# Patient Record
Sex: MALE | URBAN - METROPOLITAN AREA
[De-identification: names, ages, dates, MRNs, and addresses within clinical notes are randomized per-mention and may not be internally consistent; named-entity substitution may affect disease eponyms.]

---

## 2022-09-13 ENCOUNTER — APPOINTMENT (OUTPATIENT)
Dept: URBAN - METROPOLITAN AREA CLINIC 245 | Age: 57
Setting detail: DERMATOLOGY
End: 2022-09-13

## 2022-09-13 DIAGNOSIS — D18.0 HEMANGIOMA: ICD-10-CM

## 2022-09-13 DIAGNOSIS — L82.0 INFLAMED SEBORRHEIC KERATOSIS: ICD-10-CM

## 2022-09-13 DIAGNOSIS — D22 MELANOCYTIC NEVI: ICD-10-CM

## 2022-09-13 DIAGNOSIS — L91.8 OTHER HYPERTROPHIC DISORDERS OF THE SKIN: ICD-10-CM

## 2022-09-13 DIAGNOSIS — L57.8 OTHER SKIN CHANGES DUE TO CHRONIC EXPOSURE TO NONIONIZING RADIATION: ICD-10-CM

## 2022-09-13 DIAGNOSIS — L82.1 OTHER SEBORRHEIC KERATOSIS: ICD-10-CM

## 2022-09-13 PROBLEM — D23.71 OTHER BENIGN NEOPLASM OF SKIN OF RIGHT LOWER LIMB, INCLUDING HIP: Status: ACTIVE | Noted: 2022-09-13

## 2022-09-13 PROBLEM — D22.5 MELANOCYTIC NEVI OF TRUNK: Status: ACTIVE | Noted: 2022-09-13

## 2022-09-13 PROBLEM — D18.01 HEMANGIOMA OF SKIN AND SUBCUTANEOUS TISSUE: Status: ACTIVE | Noted: 2022-09-13

## 2022-09-13 PROCEDURE — OTHER COUNSELING: OTHER

## 2022-09-13 PROCEDURE — 99213 OFFICE O/P EST LOW 20 MIN: CPT | Mod: 25

## 2022-09-13 PROCEDURE — 17110 DESTRUCT B9 LESION 1-14: CPT

## 2022-09-13 PROCEDURE — OTHER LIQUID NITROGEN: OTHER

## 2022-09-13 PROCEDURE — OTHER MIPS QUALITY: OTHER

## 2022-09-13 ASSESSMENT — LOCATION SIMPLE DESCRIPTION DERM
LOCATION SIMPLE: RIGHT UPPER BACK
LOCATION SIMPLE: LEFT FOREHEAD
LOCATION SIMPLE: LEFT CHEEK
LOCATION SIMPLE: LEFT ANTERIOR NECK
LOCATION SIMPLE: CHEST
LOCATION SIMPLE: LEFT UPPER BACK

## 2022-09-13 ASSESSMENT — LOCATION DETAILED DESCRIPTION DERM
LOCATION DETAILED: UPPER STERNUM
LOCATION DETAILED: LEFT INFERIOR MEDIAL MALAR CHEEK
LOCATION DETAILED: LEFT SUPERIOR MEDIAL UPPER BACK
LOCATION DETAILED: RIGHT MEDIAL SUPERIOR CHEST
LOCATION DETAILED: LEFT MEDIAL UPPER BACK
LOCATION DETAILED: LEFT INFERIOR ANTERIOR NECK
LOCATION DETAILED: RIGHT SUPERIOR UPPER BACK
LOCATION DETAILED: LEFT FOREHEAD

## 2022-09-13 ASSESSMENT — LOCATION ZONE DERM
LOCATION ZONE: FACE
LOCATION ZONE: NECK
LOCATION ZONE: TRUNK

## 2022-09-13 NOTE — PROCEDURE: LIQUID NITROGEN
Duration Of Freeze Thaw-Cycle (Seconds): 5-10
Medical Necessity Clause: This procedure was medically necessary because the lesions that were treated were:
Include Z78.9 (Other Specified Conditions Influencing Health Status) As An Associated Diagnosis?: No
Show Applicator Variable?: Yes
Application Tool (Optional): Cry-AC
Medical Necessity Information: It is in your best interest to select a reason for this procedure from the list below. All of these items fulfill various CMS LCD requirements except the new and changing color options.
Post-Care Instructions: I reviewed with the patient in detail post-care instructions. Patient is to wear sunprotection, and avoid picking at any of the treated lesions. Pt may apply Vaseline to crusted or scabbing areas.
Spray Paint Text: The liquid nitrogen was applied to the skin utilizing a spray paint frosting technique.
Detail Level: Detailed
Consent: The patient's consent was obtained including but not limited to risks of crusting, scabbing, blistering, scarring, darker or lighter pigmentary change, recurrence, incomplete removal and infection.
Number Of Freeze-Thaw Cycles: 2 freeze-thaw cycles

## 2022-09-13 NOTE — PROCEDURE: COUNSELING
Sunscreen Recommendations: SPF 50 or higher, applied daily or hourly when heavy sun exposure is anticipated
Nicotinamide Supplementation Recommendations: Nicotinamide is purchased over-the-counter in 500 mg capsules.  Nicotinamide should be taken as one 500 mg capsule twice a day. Supplementation with Nicotinamide does not replace sunscreen application.
Detail Level: Zone
Detail Level: Detailed
Detail Level: Simple

## 2023-09-13 ENCOUNTER — APPOINTMENT (OUTPATIENT)
Dept: URBAN - METROPOLITAN AREA CLINIC 245 | Age: 58
Setting detail: DERMATOLOGY
End: 2023-09-14

## 2023-09-13 DIAGNOSIS — L91.8 OTHER HYPERTROPHIC DISORDERS OF THE SKIN: ICD-10-CM

## 2023-09-13 DIAGNOSIS — L57.8 OTHER SKIN CHANGES DUE TO CHRONIC EXPOSURE TO NONIONIZING RADIATION: ICD-10-CM

## 2023-09-13 DIAGNOSIS — D18.0 HEMANGIOMA: ICD-10-CM

## 2023-09-13 DIAGNOSIS — L82.1 OTHER SEBORRHEIC KERATOSIS: ICD-10-CM

## 2023-09-13 DIAGNOSIS — D22 MELANOCYTIC NEVI: ICD-10-CM

## 2023-09-13 PROBLEM — D18.01 HEMANGIOMA OF SKIN AND SUBCUTANEOUS TISSUE: Status: ACTIVE | Noted: 2023-09-13

## 2023-09-13 PROBLEM — D23.71 OTHER BENIGN NEOPLASM OF SKIN OF RIGHT LOWER LIMB, INCLUDING HIP: Status: ACTIVE | Noted: 2023-09-13

## 2023-09-13 PROBLEM — D22.5 MELANOCYTIC NEVI OF TRUNK: Status: ACTIVE | Noted: 2023-09-13

## 2023-09-13 PROCEDURE — OTHER OBSERVATION: OTHER

## 2023-09-13 PROCEDURE — OTHER MIPS QUALITY: OTHER

## 2023-09-13 PROCEDURE — OTHER COUNSELING: OTHER

## 2023-09-13 PROCEDURE — 99213 OFFICE O/P EST LOW 20 MIN: CPT

## 2023-09-13 ASSESSMENT — LOCATION SIMPLE DESCRIPTION DERM
LOCATION SIMPLE: RIGHT UPPER BACK
LOCATION SIMPLE: ABDOMEN
LOCATION SIMPLE: LEFT CHEEK
LOCATION SIMPLE: RIGHT LOWER BACK
LOCATION SIMPLE: RIGHT ANTERIOR NECK

## 2023-09-13 ASSESSMENT — LOCATION ZONE DERM
LOCATION ZONE: TRUNK
LOCATION ZONE: NECK
LOCATION ZONE: FACE

## 2023-09-13 ASSESSMENT — LOCATION DETAILED DESCRIPTION DERM
LOCATION DETAILED: RIGHT MID-UPPER BACK
LOCATION DETAILED: RIGHT SUPERIOR LATERAL MIDBACK
LOCATION DETAILED: RIGHT INFERIOR ANTERIOR NECK
LOCATION DETAILED: LEFT INFERIOR CENTRAL MALAR CHEEK
LOCATION DETAILED: RIGHT LATERAL ABDOMEN
LOCATION DETAILED: RIGHT SUPERIOR UPPER BACK

## 2023-09-13 NOTE — PROCEDURE: COUNSELING
Sunscreen Recommendation Label Override: OTC broad spectrum sunscreen with SPF 50+; reapplied hourly during times of heavy sun exposure
Detail Level: Zone
Detail Level: Simple

## 2024-09-17 ENCOUNTER — APPOINTMENT (OUTPATIENT)
Dept: URBAN - METROPOLITAN AREA CLINIC 245 | Age: 59
Setting detail: DERMATOLOGY
End: 2024-09-17

## 2024-09-17 DIAGNOSIS — L82.0 INFLAMED SEBORRHEIC KERATOSIS: ICD-10-CM

## 2024-09-17 DIAGNOSIS — L82.1 OTHER SEBORRHEIC KERATOSIS: ICD-10-CM

## 2024-09-17 DIAGNOSIS — D485 NEOPLASM OF UNCERTAIN BEHAVIOR OF SKIN: ICD-10-CM

## 2024-09-17 DIAGNOSIS — D22 MELANOCYTIC NEVI: ICD-10-CM

## 2024-09-17 DIAGNOSIS — L91.8 OTHER HYPERTROPHIC DISORDERS OF THE SKIN: ICD-10-CM

## 2024-09-17 DIAGNOSIS — L57.0 ACTINIC KERATOSIS: ICD-10-CM

## 2024-09-17 DIAGNOSIS — L57.8 OTHER SKIN CHANGES DUE TO CHRONIC EXPOSURE TO NONIONIZING RADIATION: ICD-10-CM

## 2024-09-17 DIAGNOSIS — D18.0 HEMANGIOMA: ICD-10-CM

## 2024-09-17 PROBLEM — D18.01 HEMANGIOMA OF SKIN AND SUBCUTANEOUS TISSUE: Status: ACTIVE | Noted: 2024-09-17

## 2024-09-17 PROBLEM — D22.5 MELANOCYTIC NEVI OF TRUNK: Status: ACTIVE | Noted: 2024-09-17

## 2024-09-17 PROBLEM — D48.5 NEOPLASM OF UNCERTAIN BEHAVIOR OF SKIN: Status: ACTIVE | Noted: 2024-09-17

## 2024-09-17 PROBLEM — D23.71 OTHER BENIGN NEOPLASM OF SKIN OF RIGHT LOWER LIMB, INCLUDING HIP: Status: ACTIVE | Noted: 2024-09-17

## 2024-09-17 PROCEDURE — 11301 SHAVE SKIN LESION 0.6-1.0 CM: CPT

## 2024-09-17 PROCEDURE — OTHER SHAVE REMOVAL: OTHER

## 2024-09-17 PROCEDURE — 17110 DESTRUCT B9 LESION 1-14: CPT | Mod: 59

## 2024-09-17 PROCEDURE — 17000 DESTRUCT PREMALG LESION: CPT | Mod: 59

## 2024-09-17 PROCEDURE — OTHER COUNSELING: OTHER

## 2024-09-17 PROCEDURE — OTHER MIPS QUALITY: OTHER

## 2024-09-17 PROCEDURE — OTHER LIQUID NITROGEN: OTHER

## 2024-09-17 PROCEDURE — OTHER OBSERVATION: OTHER

## 2024-09-17 PROCEDURE — 99213 OFFICE O/P EST LOW 20 MIN: CPT | Mod: 25

## 2024-09-17 ASSESSMENT — LOCATION DETAILED DESCRIPTION DERM
LOCATION DETAILED: LEFT INFERIOR CENTRAL MALAR CHEEK
LOCATION DETAILED: RIGHT LATERAL ABDOMEN
LOCATION DETAILED: RIGHT INFERIOR ANTERIOR NECK
LOCATION DETAILED: RIGHT SUPERIOR LATERAL MIDBACK
LOCATION DETAILED: RIGHT SUPERIOR UPPER BACK
LOCATION DETAILED: LEFT SUPERIOR MEDIAL UPPER BACK
LOCATION DETAILED: LEFT CENTRAL FRONTAL SCALP
LOCATION DETAILED: RIGHT MID-UPPER BACK
LOCATION DETAILED: RIGHT MID PREAURICULAR CHEEK

## 2024-09-17 ASSESSMENT — LOCATION SIMPLE DESCRIPTION DERM
LOCATION SIMPLE: RIGHT ANTERIOR NECK
LOCATION SIMPLE: LEFT CHEEK
LOCATION SIMPLE: RIGHT LOWER BACK
LOCATION SIMPLE: LEFT SCALP
LOCATION SIMPLE: LEFT UPPER BACK
LOCATION SIMPLE: ABDOMEN
LOCATION SIMPLE: RIGHT UPPER BACK
LOCATION SIMPLE: RIGHT CHEEK

## 2024-09-17 ASSESSMENT — LOCATION ZONE DERM
LOCATION ZONE: FACE
LOCATION ZONE: NECK
LOCATION ZONE: SCALP
LOCATION ZONE: TRUNK

## 2024-09-17 NOTE — PROCEDURE: LIQUID NITROGEN
Medical Necessity Information: It is in your best interest to select a reason for this procedure from the list below. All of these items fulfill various CMS LCD requirements except the new and changing color options.
Detail Level: Detailed
Post-Care Instructions: I reviewed with the patient in detail post-care instructions. Patient is to wear sunprotection, and avoid picking at any of the treated lesions. Pt may apply Vaseline to crusted or scabbing areas.
Show Topical Anesthesia Variable?: Yes
Medical Necessity Clause: This procedure was medically necessary because the lesions that were treated were:
Spray Paint Technique: No
Consent: The patient's consent was obtained including but not limited to risks of crusting, scabbing, blistering, scarring, darker or lighter pigmentary change, recurrence, incomplete removal and infection.
Spray Paint Text: The liquid nitrogen was applied to the skin utilizing a spray paint frosting technique.
Number Of Freeze-Thaw Cycles: 2 freeze-thaw cycles
Duration Of Freeze Thaw-Cycle (Seconds): 5

## 2024-09-17 NOTE — PROCEDURE: COUNSELING
Detail Level: Simple
Sunscreen Recommendation Label Override: OTC broad spectrum sunscreen with SPF 50+; reapplied hourly during times of heavy sun exposure
Detail Level: Zone
Detail Level: Detailed

## 2024-09-17 NOTE — PROCEDURE: SHAVE REMOVAL
Medical Necessity Information: It is in your best interest to select a reason for this procedure from the list below. All of these items fulfill various CMS LCD requirements except the new and changing color options.
Medical Necessity Clause: This procedure was medically necessary because the lesion that was treated was:
Lab: -[id number]
Lab Facility: 0
Body Location Override (Optional - Billing Will Still Be Based On Selected Body Map Location If Applicable): left upper back near midline
Detail Level: Detailed
Was A Bandage Applied: Yes
Size Of Lesion In Cm (Required): 0.7
Depth Of Shave: dermis
Biopsy Method: Dermablade
Anesthesia Type: 1% lidocaine with epinephrine
Hemostasis: Drysol
Wound Care: Petrolatum
Path Notes (To The Dermatopathologist): Check margins
Render Path Notes In Note?: No
Consent was obtained from the patient. The risks and benefits to therapy were discussed in detail. Specifically, the risks of infection, scarring, bleeding, prolonged wound healing, incomplete removal, allergy to anesthesia, nerve injury and recurrence were addressed. Prior to the procedure, the treatment site was clearly identified and confirmed by the patient. All components of Universal Protocol/PAUSE Rule completed.
Post-Care Instructions: I reviewed with the patient in detail post-care instructions. Patient is to keep the biopsy site dry overnight, and then apply bacitracin twice daily until healed. Patient may apply hydrogen peroxide soaks to remove any crusting.
Notification Instructions: Patient will be notified of pathology results. However, patient instructed to call the office if not contacted within 2 weeks.
Billing Type: Third-Party Bill

## 2025-04-04 RX ORDER — METOPROLOL SUCCINATE 50 MG/1
50 TABLET, EXTENDED RELEASE ORAL 2 TIMES DAILY
COMMUNITY

## 2025-04-04 RX ORDER — FLECAINIDE ACETATE 50 MG/1
50 TABLET ORAL 2 TIMES DAILY
COMMUNITY

## 2025-04-04 NOTE — PAT NURSING NOTE
Called and spoke with pt regarding PAT call. We reviewed instructions and he v/u. Attempting to retrieve the CTA and labs he said he had done at [facility].    Will get another phone call the day before w/ TOA. Check in at  Reg desk at [facility]. NPO after midnoc. Stop vitamins/supplements for 1 week prior to procedure, starting now. Do not take Xarelto the morning of procedure. Okay to take Flecainide and Metoprolol that morning w/ SOW. Bring in CPAP mask and tubing. Shower w/ antibacterial soap before coming in. Pt has spouse driving him. No questions and he v/u.

## 2025-04-08 ENCOUNTER — ANESTHESIA EVENT (OUTPATIENT)
Dept: INTERVENTIONAL RADIOLOGY/VASCULAR | Facility: HOSPITAL | Age: 60
End: 2025-04-08
Payer: COMMERCIAL

## 2025-04-10 ENCOUNTER — ANESTHESIA (OUTPATIENT)
Dept: INTERVENTIONAL RADIOLOGY/VASCULAR | Facility: HOSPITAL | Age: 60
End: 2025-04-10
Payer: COMMERCIAL

## 2025-04-10 ENCOUNTER — HOSPITAL ENCOUNTER (OUTPATIENT)
Dept: INTERVENTIONAL RADIOLOGY/VASCULAR | Facility: HOSPITAL | Age: 60
Discharge: HOME | End: 2025-04-11
Attending: INTERNAL MEDICINE | Admitting: INTERNAL MEDICINE
Payer: COMMERCIAL

## 2025-04-10 DIAGNOSIS — I48.91 A-FIB (HCC): ICD-10-CM

## 2025-04-10 LAB
ATRIAL RATE: 67 BPM
ISTAT ACTIVATED CLOTTING TIME: 228 SECONDS (ref 74–137)
ISTAT ACTIVATED CLOTTING TIME: 233 SECONDS (ref 74–137)
ISTAT ACTIVATED CLOTTING TIME: 279 SECONDS (ref 74–137)
ISTAT ACTIVATED CLOTTING TIME: 291 SECONDS (ref 74–137)
ISTAT ACTIVATED CLOTTING TIME: 291 SECONDS (ref 74–137)
ISTAT ACTIVATED CLOTTING TIME: 308 SECONDS (ref 74–137)
ISTAT ACTIVATED CLOTTING TIME: 308 SECONDS (ref 74–137)
ISTAT ACTIVATED CLOTTING TIME: 314 SECONDS (ref 74–137)
ISTAT ACTIVATED CLOTTING TIME: 314 SECONDS (ref 74–137)
P AXIS: 89 DEGREES
P-R INTERVAL: 174 MS
Q-T INTERVAL: 448 MS
QRS DURATION: 134 MS
QTC CALCULATION (BEZET): 473 MS
R AXIS: 67 DEGREES
T AXIS: 45 DEGREES
VENTRICULAR RATE: 67 BPM

## 2025-04-10 PROCEDURE — 85347 COAGULATION TIME ACTIVATED: CPT

## 2025-04-10 PROCEDURE — 93657 TX L/R ATRIAL FIB ADDL: CPT | Performed by: INTERNAL MEDICINE

## 2025-04-10 PROCEDURE — 93005 ELECTROCARDIOGRAM TRACING: CPT

## 2025-04-10 PROCEDURE — 93655 ICAR CATH ABLTJ DSCRT ARRHYT: CPT | Performed by: INTERNAL MEDICINE

## 2025-04-10 PROCEDURE — 94660 CPAP INITIATION&MGMT: CPT

## 2025-04-10 PROCEDURE — 02583ZZ DESTRUCTION OF CONDUCTION MECHANISM, PERCUTANEOUS APPROACH: ICD-10-PCS | Performed by: INTERNAL MEDICINE

## 2025-04-10 PROCEDURE — 93010 ELECTROCARDIOGRAM REPORT: CPT | Performed by: INTERNAL MEDICINE

## 2025-04-10 PROCEDURE — 4A023FZ MEASUREMENT OF CARDIAC RHYTHM, PERCUTANEOUS APPROACH: ICD-10-PCS | Performed by: INTERNAL MEDICINE

## 2025-04-10 PROCEDURE — 02K83ZZ MAP CONDUCTION MECHANISM, PERCUTANEOUS APPROACH: ICD-10-PCS | Performed by: INTERNAL MEDICINE

## 2025-04-10 PROCEDURE — 36593 DECLOT VASCULAR DEVICE: CPT

## 2025-04-10 PROCEDURE — 4A0234Z MEASUREMENT OF CARDIAC ELECTRICAL ACTIVITY, PERCUTANEOUS APPROACH: ICD-10-PCS | Performed by: INTERNAL MEDICINE

## 2025-04-10 PROCEDURE — 93656 COMPRE EP EVAL ABLTJ ATR FIB: CPT | Performed by: INTERNAL MEDICINE

## 2025-04-10 RX ORDER — HYDROMORPHONE HYDROCHLORIDE 1 MG/ML
0.4 INJECTION, SOLUTION INTRAMUSCULAR; INTRAVENOUS; SUBCUTANEOUS EVERY 5 MIN PRN
Status: DISCONTINUED | OUTPATIENT
Start: 2025-04-10 | End: 2025-04-10 | Stop reason: HOSPADM

## 2025-04-10 RX ORDER — HEPARIN SODIUM 5000 [USP'U]/ML
INJECTION, SOLUTION INTRAVENOUS; SUBCUTANEOUS AS NEEDED
Status: DISCONTINUED | OUTPATIENT
Start: 2025-04-10 | End: 2025-04-10 | Stop reason: SURG

## 2025-04-10 RX ORDER — HYDROMORPHONE HYDROCHLORIDE 1 MG/ML
0.6 INJECTION, SOLUTION INTRAMUSCULAR; INTRAVENOUS; SUBCUTANEOUS EVERY 5 MIN PRN
Status: DISCONTINUED | OUTPATIENT
Start: 2025-04-10 | End: 2025-04-10 | Stop reason: HOSPADM

## 2025-04-10 RX ORDER — HYDROMORPHONE HYDROCHLORIDE 1 MG/ML
0.2 INJECTION, SOLUTION INTRAMUSCULAR; INTRAVENOUS; SUBCUTANEOUS EVERY 5 MIN PRN
Status: DISCONTINUED | OUTPATIENT
Start: 2025-04-10 | End: 2025-04-10 | Stop reason: HOSPADM

## 2025-04-10 RX ORDER — NALOXONE HYDROCHLORIDE 0.4 MG/ML
80 INJECTION, SOLUTION INTRAMUSCULAR; INTRAVENOUS; SUBCUTANEOUS AS NEEDED
Status: DISCONTINUED | OUTPATIENT
Start: 2025-04-10 | End: 2025-04-10 | Stop reason: HOSPADM

## 2025-04-10 RX ORDER — MIDAZOLAM HYDROCHLORIDE 1 MG/ML
INJECTION INTRAMUSCULAR; INTRAVENOUS AS NEEDED
Status: DISCONTINUED | OUTPATIENT
Start: 2025-04-10 | End: 2025-04-10 | Stop reason: SURG

## 2025-04-10 RX ORDER — ALBUTEROL SULFATE 0.83 MG/ML
SOLUTION RESPIRATORY (INHALATION)
Status: COMPLETED
Start: 2025-04-10 | End: 2025-04-10

## 2025-04-10 RX ORDER — METOCLOPRAMIDE HYDROCHLORIDE 5 MG/ML
INJECTION INTRAMUSCULAR; INTRAVENOUS AS NEEDED
Status: DISCONTINUED | OUTPATIENT
Start: 2025-04-10 | End: 2025-04-10 | Stop reason: SURG

## 2025-04-10 RX ORDER — LIDOCAINE HYDROCHLORIDE 10 MG/ML
INJECTION, SOLUTION EPIDURAL; INFILTRATION; INTRACAUDAL; PERINEURAL AS NEEDED
Status: DISCONTINUED | OUTPATIENT
Start: 2025-04-10 | End: 2025-04-10 | Stop reason: SURG

## 2025-04-10 RX ORDER — PROTAMINE SULFATE 10 MG/ML
INJECTION, SOLUTION INTRAVENOUS AS NEEDED
Status: DISCONTINUED | OUTPATIENT
Start: 2025-04-10 | End: 2025-04-10 | Stop reason: SURG

## 2025-04-10 RX ORDER — ACETAMINOPHEN 500 MG
1000 TABLET ORAL ONCE AS NEEDED
Status: DISCONTINUED | OUTPATIENT
Start: 2025-04-10 | End: 2025-04-10 | Stop reason: HOSPADM

## 2025-04-10 RX ORDER — LIDOCAINE HYDROCHLORIDE AND EPINEPHRINE 10; 10 MG/ML; UG/ML
INJECTION, SOLUTION INFILTRATION; PERINEURAL
Status: COMPLETED
Start: 2025-04-10 | End: 2025-04-10

## 2025-04-10 RX ORDER — SODIUM CHLORIDE 9 MG/ML
INJECTION, SOLUTION INTRAVENOUS
Status: COMPLETED | OUTPATIENT
Start: 2025-04-10 | End: 2025-04-10

## 2025-04-10 RX ORDER — ROCURONIUM BROMIDE 10 MG/ML
INJECTION, SOLUTION INTRAVENOUS AS NEEDED
Status: DISCONTINUED | OUTPATIENT
Start: 2025-04-10 | End: 2025-04-10 | Stop reason: SURG

## 2025-04-10 RX ORDER — SODIUM CHLORIDE 9 MG/ML
INJECTION, SOLUTION INTRAVENOUS CONTINUOUS PRN
Status: DISCONTINUED | OUTPATIENT
Start: 2025-04-10 | End: 2025-04-10 | Stop reason: SURG

## 2025-04-10 RX ORDER — ALCOHOL 0.98 ML/ML
INJECTION INTRASPINAL
Status: COMPLETED
Start: 2025-04-10 | End: 2025-04-10

## 2025-04-10 RX ORDER — SODIUM CHLORIDE 9 MG/ML
INJECTION, SOLUTION INTRAVENOUS CONTINUOUS
Status: ACTIVE | OUTPATIENT
Start: 2025-04-10 | End: 2025-04-10

## 2025-04-10 RX ORDER — ONDANSETRON 2 MG/ML
4 INJECTION INTRAMUSCULAR; INTRAVENOUS EVERY 6 HOURS PRN
Status: DISCONTINUED | OUTPATIENT
Start: 2025-04-10 | End: 2025-04-10 | Stop reason: HOSPADM

## 2025-04-10 RX ORDER — DEXAMETHASONE SODIUM PHOSPHATE 4 MG/ML
VIAL (ML) INJECTION AS NEEDED
Status: DISCONTINUED | OUTPATIENT
Start: 2025-04-10 | End: 2025-04-10 | Stop reason: SURG

## 2025-04-10 RX ORDER — ALBUTEROL SULFATE 0.83 MG/ML
2.5 SOLUTION RESPIRATORY (INHALATION) ONCE
Status: COMPLETED | OUTPATIENT
Start: 2025-04-10 | End: 2025-04-10

## 2025-04-10 RX ORDER — HEPARIN SODIUM 5000 [USP'U]/ML
INJECTION, SOLUTION INTRAVENOUS; SUBCUTANEOUS
Status: COMPLETED
Start: 2025-04-10 | End: 2025-04-10

## 2025-04-10 RX ORDER — MIDAZOLAM HYDROCHLORIDE 1 MG/ML
1 INJECTION INTRAMUSCULAR; INTRAVENOUS EVERY 5 MIN PRN
Status: DISCONTINUED | OUTPATIENT
Start: 2025-04-10 | End: 2025-04-10 | Stop reason: HOSPADM

## 2025-04-10 RX ORDER — PROCHLORPERAZINE EDISYLATE 5 MG/ML
5 INJECTION INTRAMUSCULAR; INTRAVENOUS EVERY 8 HOURS PRN
Status: DISCONTINUED | OUTPATIENT
Start: 2025-04-10 | End: 2025-04-10 | Stop reason: HOSPADM

## 2025-04-10 RX ORDER — ONDANSETRON 2 MG/ML
INJECTION INTRAMUSCULAR; INTRAVENOUS AS NEEDED
Status: DISCONTINUED | OUTPATIENT
Start: 2025-04-10 | End: 2025-04-10 | Stop reason: SURG

## 2025-04-10 RX ORDER — LABETALOL HYDROCHLORIDE 5 MG/ML
5 INJECTION, SOLUTION INTRAVENOUS EVERY 5 MIN PRN
Status: DISCONTINUED | OUTPATIENT
Start: 2025-04-10 | End: 2025-04-10 | Stop reason: HOSPADM

## 2025-04-10 RX ORDER — SODIUM CHLORIDE, SODIUM LACTATE, POTASSIUM CHLORIDE, CALCIUM CHLORIDE 600; 310; 30; 20 MG/100ML; MG/100ML; MG/100ML; MG/100ML
INJECTION, SOLUTION INTRAVENOUS CONTINUOUS
Status: DISCONTINUED | OUTPATIENT
Start: 2025-04-10 | End: 2025-04-10 | Stop reason: HOSPADM

## 2025-04-10 RX ORDER — IODIXANOL 320 MG/ML
100 INJECTION, SOLUTION INTRAVASCULAR
Status: COMPLETED | OUTPATIENT
Start: 2025-04-10 | End: 2025-04-10

## 2025-04-10 RX ORDER — GLYCOPYRROLATE 0.2 MG/ML
INJECTION, SOLUTION INTRAMUSCULAR; INTRAVENOUS AS NEEDED
Status: DISCONTINUED | OUTPATIENT
Start: 2025-04-10 | End: 2025-04-10 | Stop reason: SURG

## 2025-04-10 RX ORDER — PROTAMINE SULFATE 10 MG/ML
INJECTION, SOLUTION INTRAVENOUS
Status: COMPLETED
Start: 2025-04-10 | End: 2025-04-10

## 2025-04-10 RX ORDER — PHENYLEPHRINE HCL 10 MG/ML
VIAL (ML) INJECTION AS NEEDED
Status: DISCONTINUED | OUTPATIENT
Start: 2025-04-10 | End: 2025-04-10 | Stop reason: SURG

## 2025-04-10 RX ORDER — HEPARIN SODIUM 1000 [USP'U]/ML
INJECTION, SOLUTION INTRAVENOUS; SUBCUTANEOUS
Status: COMPLETED
Start: 2025-04-10 | End: 2025-04-10

## 2025-04-10 RX ORDER — NEOSTIGMINE METHYLSULFATE 1 MG/ML
INJECTION INTRAVENOUS AS NEEDED
Status: DISCONTINUED | OUTPATIENT
Start: 2025-04-10 | End: 2025-04-10 | Stop reason: SURG

## 2025-04-10 RX ORDER — NITROGLYCERIN 20 MG/100ML
INJECTION INTRAVENOUS
Status: COMPLETED
Start: 2025-04-10 | End: 2025-04-10

## 2025-04-10 RX ADMIN — HEPARIN SODIUM 7000 UNITS: 5000 INJECTION, SOLUTION INTRAVENOUS; SUBCUTANEOUS at 09:55:00

## 2025-04-10 RX ADMIN — HEPARIN SODIUM 10000 UNITS: 5000 INJECTION, SOLUTION INTRAVENOUS; SUBCUTANEOUS at 07:37:00

## 2025-04-10 RX ADMIN — HEPARIN SODIUM 3000 UNITS: 5000 INJECTION, SOLUTION INTRAVENOUS; SUBCUTANEOUS at 12:25:00

## 2025-04-10 RX ADMIN — ROCURONIUM BROMIDE 50 MG: 10 INJECTION, SOLUTION INTRAVENOUS at 07:25:00

## 2025-04-10 RX ADMIN — METOCLOPRAMIDE HYDROCHLORIDE 10 MG: 5 INJECTION INTRAMUSCULAR; INTRAVENOUS at 07:25:00

## 2025-04-10 RX ADMIN — SODIUM CHLORIDE: 9 INJECTION, SOLUTION INTRAVENOUS at 14:45:00

## 2025-04-10 RX ADMIN — HEPARIN SODIUM 5000 UNITS: 5000 INJECTION, SOLUTION INTRAVENOUS; SUBCUTANEOUS at 11:25:00

## 2025-04-10 RX ADMIN — PROTAMINE SULFATE 50 MG: 10 INJECTION, SOLUTION INTRAVENOUS at 13:40:00

## 2025-04-10 RX ADMIN — HEPARIN SODIUM 2000 UNITS: 5000 INJECTION, SOLUTION INTRAVENOUS; SUBCUTANEOUS at 11:55:00

## 2025-04-10 RX ADMIN — PHENYLEPHRINE HCL 100 MCG: 10 MG/ML VIAL (ML) INJECTION at 07:20:00

## 2025-04-10 RX ADMIN — ONDANSETRON 4 MG: 2 INJECTION INTRAMUSCULAR; INTRAVENOUS at 13:45:00

## 2025-04-10 RX ADMIN — IODIXANOL 30 ML: 320 INJECTION, SOLUTION INTRAVASCULAR at 14:32:00

## 2025-04-10 RX ADMIN — ROCURONIUM BROMIDE 10 MG: 10 INJECTION, SOLUTION INTRAVENOUS at 11:40:00

## 2025-04-10 RX ADMIN — ROCURONIUM BROMIDE 10 MG: 10 INJECTION, SOLUTION INTRAVENOUS at 12:20:00

## 2025-04-10 RX ADMIN — HEPARIN SODIUM 5000 UNITS: 5000 INJECTION, SOLUTION INTRAVENOUS; SUBCUTANEOUS at 08:02:00

## 2025-04-10 RX ADMIN — HEPARIN SODIUM 5000 UNITS: 5000 INJECTION, SOLUTION INTRAVENOUS; SUBCUTANEOUS at 09:40:00

## 2025-04-10 RX ADMIN — ROCURONIUM BROMIDE 10 MG: 10 INJECTION, SOLUTION INTRAVENOUS at 07:20:00

## 2025-04-10 RX ADMIN — ROCURONIUM BROMIDE 10 MG: 10 INJECTION, SOLUTION INTRAVENOUS at 08:20:00

## 2025-04-10 RX ADMIN — LIDOCAINE HYDROCHLORIDE 100 MG: 10 INJECTION, SOLUTION EPIDURAL; INFILTRATION; INTRACAUDAL; PERINEURAL at 07:20:00

## 2025-04-10 RX ADMIN — SODIUM CHLORIDE, SODIUM LACTATE, POTASSIUM CHLORIDE, CALCIUM CHLORIDE: 600; 310; 30; 20 INJECTION, SOLUTION INTRAVENOUS at 14:32:00

## 2025-04-10 RX ADMIN — SODIUM CHLORIDE: 9 INJECTION, SOLUTION INTRAVENOUS at 07:15:00

## 2025-04-10 RX ADMIN — HEPARIN SODIUM 5000 UNITS: 5000 INJECTION, SOLUTION INTRAVENOUS; SUBCUTANEOUS at 10:15:00

## 2025-04-10 RX ADMIN — ROCURONIUM BROMIDE 30 MG: 10 INJECTION, SOLUTION INTRAVENOUS at 08:50:00

## 2025-04-10 RX ADMIN — HEPARIN SODIUM 5000 UNITS: 5000 INJECTION, SOLUTION INTRAVENOUS; SUBCUTANEOUS at 13:20:00

## 2025-04-10 RX ADMIN — ALBUTEROL SULFATE 2.5 MG: 0.83 SOLUTION RESPIRATORY (INHALATION) at 14:55:00

## 2025-04-10 RX ADMIN — NEOSTIGMINE METHYLSULFATE 5 MG: 1 INJECTION INTRAVENOUS at 14:00:00

## 2025-04-10 RX ADMIN — GLYCOPYRROLATE 0.8 MG: 0.2 INJECTION, SOLUTION INTRAMUSCULAR; INTRAVENOUS at 14:00:00

## 2025-04-10 RX ADMIN — MIDAZOLAM HYDROCHLORIDE 2 MG: 1 INJECTION INTRAMUSCULAR; INTRAVENOUS at 07:15:00

## 2025-04-10 RX ADMIN — SODIUM CHLORIDE: 9 INJECTION, SOLUTION INTRAVENOUS at 06:55:00

## 2025-04-10 RX ADMIN — DEXAMETHASONE SODIUM PHOSPHATE 4 MG: 4 MG/ML VIAL (ML) INJECTION at 07:25:00

## 2025-04-10 RX ADMIN — ROCURONIUM BROMIDE 10 MG: 10 INJECTION, SOLUTION INTRAVENOUS at 09:20:00

## 2025-04-10 NOTE — PROCEDURES
Procedures performed:  1. Comprehensive EP study with 3-D mapping using CARTO  2. CS catheter placement to record and pace the left atrium.   3. PFA of atrial fibrillation with posterior wall isolation.   4. RFA of a separate and  3 distinct arrhythmias, 1. typical atrial flutter. 2. Atypical mitral flutter. 3. Atypical figure of 8 posterior wall flutter.   5. Intra-cardiac echo (ICE)    : Jovanny Figueroa MD    Pre-Op: persistent atrial fibrillation.   Post-Op: sinus rhythm    Complication: none    Methods: The patient was brought to the EP lab in a fasting state and non-sedated state. The right and left groins were prepped and draped in a sterile fashion. 3 sheaths were placed in the right femoral yenni via the modified Seldinger technique. Catheters were placed in the appropriate position under ICE and 3D mapping. Baseline measurements were recorded and programmed stimulation from the atrium and ventricle was performed. At the conclusion of the procedure, catheters and sheaths were removed and hemostasis was achieved with Vascade closure devices. There were no apparent intra-operative complications no pericardial effusion visualized by ICE. An attune cooling balloon catheter was placed in the esophagus.The patient was transported in stable condition to recovery.    Mapping and ablation: A heparin bolus was given, intermittent boluses were subsequently given to maintain an ACT of at least 300 seconds. There was no effusion at baseline. The CHRISTOS was visualized with ICE, there did not appear to be a thrombus in the CHRISTOS. Transseptal (TS) access was achieved with ICE guidance.     A steerable sheath was advanced to the CS. RF lesions were delivered in the distal great cardiac vein in the line of the mitral isthmus. The ablation catheter was exchange for a JR catheter was placed in the great cardiac vein. Contrast was injected to visualize the Vein of Chan (VOM). A balloon was advanced over an .014 wire then  inflated. Contrast injection confirmed occlusion. 5-7cc of ETOH was slowly injected. The balloon was deflated 5 minutes after ETOH injection. The sheath and catheters were withdrawn from the CS and a second transseptal was performed to the LA.     Using the CARTO mapping system and ICE, a multi-electrode catheter was used to create a 3D geometry of the LA. PVI was confirmed with differential pacing. The pulmonary veins were isolated at baseline.     Using PFA catheter, posterior wall isolation was performed and confirmed with differential pacing.     A linear set of lesions was delivered from the LIPV to the mitral valve, bidirectional block was confirmed.     A third atrial flutter spontaneously occurred, this seemed to be a figure of 8 circuit that was terminated on the posterior floor of the LA adjacent to the RIPV.     Catheters were withdrawn to the RA. A linear set of lesions was delivered in the cavo-tricuspid isthmus until bi-directional block was confirmed.     Post ablation findings:    ms,  ms, QRS 92 ms,  ms.     AH 72 ms, HV 55 ms.          CONCLUSIONS:  1. Sinus node function normal  2. AV node function normal.  3. His [place] normal  4. Pulmonary veins were isolated at baseline.  5. Status post successful LA posterior wall isolation with PFA and with VOM ETOH ablation for atypical atrial flutter. There was an additional atrial flutter that was in a figure of 8 pattern that was terminated with ablation on the posterior floor adjacent to the RIPV.  6. Status post successful ablation for typical cavo-tricuspid isthmus dependent atrial flutter. Bidirectional block was confirmed as described above. Atrial flutter was no longer inducible following RFA as described above.  7. No pericardial effusion visualized by ICE.  8.  Angioseal closure for (inadvertent) arterial puncture, vascade closure for venous access sites.    Jovanny Figueroa MD  [facility]  Cardiac  Electrophysiolgy  [phone]

## 2025-04-10 NOTE — H&P
H&P    Josep Frost Patient Status:  Outpatient    1965 MRN [id number]   [facility] INTERVENTIONAL SUITES Attending Jovanny Figueroa MD   Hosp Day # 0 PCP NEELIMA JEFFERSON     Reason for Admission:  EPS and ablation     Assessment/Plan:  Problem List[1]    Discussed risks and benefits of procedure including bleeding, infection, tamponade, stroke, AV block, and rare chance of life threatening complication.     History of Present Illness:  Josep Frost is a a(n) 60 year old male. Presents for EPS and ablation for atrial fibrillation and or flutter .     History:  Past Medical History[2]  Past Surgical History[3]  Family History[4]   reports that he has never smoked. He has never used smokeless tobacco. He reports that he does not currently use alcohol. He reports that he does not use drugs.    Allergies:  Allergies[5]    Medications:  Current Hospital Medications[6]    Review of Systems:  All systems were reviewed and are negative except as described above in HPI.    Physical Exam:  Blood pressure 146/69, pulse 63, temperature 97.8 °F (36.6 °C), temperature source Temporal, resp. rate 16, height 75\", weight (!) 380 lb (172.4 kg), SpO2 95%.  Temp (24hrs), Av.8 °F (36.6 °C), Min:97.8 °F (36.6 °C), Max:97.8 °F (36.6 °C)    Wt Readings from Last 3 Encounters:   25 (!) 380 lb (172.4 kg)       Telemetry: SR  General: Alert and oriented in no apparent distress.  HEENT: No focal deficits.  Neck: No JVD, carotids 2+ no bruits.  Cardiac: Regular rate and rhythm, S1, S2 normal, no murmur, rub or gallop.  Lungs: Clear without wheezes, rales, rhonchi or dullness.  Normal excursions and effort.  Abdomen: Soft, non-tender.   Extremities: Without clubbing, cyanosis or edema.  Peripheral pulses are 2+.  Neurologic: Alert and oriented, normal affect.  Skin: Warm and dry.     Laboratories and Data:  Diagnostics:    Labs:        No results found for: \"PT\", \"INR\"      Jovanny Figueroa MD  4/10/2025  7:34  YAIR Figueroa MD  [facility]  Cardiac Electrophysiolgy         [1] There is no problem list on file for this patient.  [2]   Past Medical History:   Arrhythmia   [3]   Past Surgical History:  Procedure Laterality Date    Ndsc ablation & rcnstj atria lmtd w/o bypass     [4] History reviewed. No pertinent family history.  [5]   Allergies  Allergen Reactions    Hydrocodone-Acetaminophen SHORTNESS OF BREATH     Vicodin   [6] No current facility-administered medications for this encounter.

## 2025-04-10 NOTE — ANESTHESIA PREPROCEDURE EVALUATION
PRE-OP EVALUATION    Patient Name: Josep Frost    Admit Diagnosis: A-fib (HCC) [I48.91]    Pre-op Diagnosis: * No pre-op diagnosis entered *        Anesthesia Procedure: CATH EP    * No surgeons found in log *    Pre-op vitals reviewed.  Temp: 97.8 °F (36.6 °C)  Pulse: 63  Resp: 16  BP: 146/69  SpO2: 95 %  Body mass index is 47.5 kg/m².    Current medications reviewed.  Hospital Medications:  Current Medications[1]    Outpatient Medications:   Prescriptions Prior to Admission[2]    Allergies: Hydrocodone-acetaminophen      Anesthesia Evaluation    Patient summary reviewed.    Anesthetic Complications  (-) history of anesthetic complications         GI/Hepatic/Renal    Negative GI/hepatic/renal ROS.                             Cardiovascular        Exercise tolerance: good     MET: >4    (+) obesity                    (+) dysrhythmias and atrial fibrillation                  Endo/Other    Negative endo/other ROS.                              Pulmonary    Negative pulmonary ROS.                       Neuro/Psych    Negative neuro/psych ROS.                                  Past Surgical History[3]  Social Hx on file[4]  History   Drug Use Unknown     Available pre-op labs reviewed.               Airway      Mallampati: IV  Mouth opening: 3 FB  TM distance: 4 - 6 cm  Neck ROM: full Cardiovascular    Cardiovascular exam normal.  Rhythm: regular  Rate: normal     Dental    Dentition appears grossly intact         Pulmonary    Pulmonary exam normal.  Breath sounds clear to auscultation bilaterally.               Other findings              ASA: 3   Plan: general  NPO status verified and patient meets guidelines.  Patient has not taken beta blockers in last 24 hours.  Post-procedure pain management plan discussed with surgeon and patient.    Comment: I spoke with the patient and discussed the risks of general anesthesia, which include nausea and vomiting; sore throat; injury to the lips, gums, teeth, and eyes; cardiac,  pulmonary, and neurologic events; aspiration; and allergic reactions. The patient understands these risks and consents to receiving general anesthesia for this procedure.  Plan/risks discussed with: patient and spouse                Present on Admission:  **None**             [1]    [COMPLETED] sodium chloride 0.9% infusion   Intravenous On Call    [COMPLETED] heparin (Porcine) 5000 UNIT/ML injection        [COMPLETED] heparin in sodium chloride 0.9% (Porcine) 2 Units/mL flush bag premix        [COMPLETED] heparin (Porcine) 1000 UNIT/ML injection        [COMPLETED] lidocaine-EPINEPHrine (Xylocaine-Epinephrine) 1 %-1:[id number] injection       [2]   Medications Prior to Admission   Medication Sig Dispense Refill Last Dose/Taking    flecainide 50 MG Oral Tab Take 1 tablet (50 mg total) by mouth in the morning and 1 tablet (50 mg total) before bedtime.   4/10/2025 Morning    metoprolol succinate ER 50 MG Oral Tablet 24 Hr Take 1 tablet (50 mg total) by mouth in the morning and 1 tablet (50 mg total) before bedtime.   4/10/2025 Morning    rivaroxaban (XARELTO) 20 MG Oral Tab Take 1 tablet (20 mg total) by mouth daily with food.   4/9/2025   [3]   Past Surgical History:  Procedure Laterality Date    Ndsc ablation & rcnstj atria lmtd w/o bypass     [4]   Social History  Socioeconomic History    Marital status:    Tobacco Use    Smoking status: Never    Smokeless tobacco: Never   Substance and Sexual Activity    Alcohol use: Not Currently    Drug use: Never

## 2025-04-10 NOTE — ANESTHESIA PROCEDURE NOTES
Arterial Line    Date/Time: 4/10/2025 7:23 AM    Performed by: Héctor Alonzo MD  Authorized by: Héctor Alonzo MD    General Information and Staff    Procedure Start:  4/10/2025 7:23 AM  Procedure End:  4/10/2025 7:25 AM  Anesthesiologist:  Héctor Alonzo MD  Performed By:  Anesthesiologist  Patient Location:  OR  Indication: continuous blood pressure monitoring and blood sampling needed    Site Identification: real time ultrasound guided    Preanesthetic Checklist: 2 patient identifiers, IV checked, risks and benefits discussed, monitors and equipment checked, pre-op evaluation, timeout performed, anesthesia consent and sterile technique used    Procedure Details    Catheter Size:  20 G  Catheter Length:  1 and 3/4 inch  Catheter Type:  Arrow  Seldinger Technique?: Yes    Laterality:  Left  Site:  Radial artery  Site Prep: chlorhexidine    Line Secured:  Tape and Tegaderm    Assessment    Events: patient tolerated procedure well with no complications      Medications  4/10/2025 7:23 AM      Additional Comments

## 2025-04-10 NOTE — ANESTHESIA POSTPROCEDURE EVALUATION
[facility]    Josep Frost Patient Status:  Outpatient   Age/Gender 60 year old male MRN [id number]   Location [facility] POST ANESTHESIA CARE UNIT Attending Jovanny Figueroa MD   Hosp Day # 0 PCP NEELIMA JEFFERSON       Anesthesia Post-op Note        Procedure Summary       Date: 04/10/25 Room / Location: [facility] Interventional Suites    Anesthesia Start: 0715 Anesthesia Stop: 1437    Procedure: CATH EP Diagnosis:       A-fib (HCC)      A-fib (HCC)    Scheduled Providers: Héctor Alonzo MD Anesthesiologist: Héctor Alonzo MD    Anesthesia Type: general ASA Status: 3            Anesthesia Type: general    Vitals Value Taken Time   /75 04/10/25 14:38   Temp 97.2 °F (36.2 °C) 04/10/25 14:38   Pulse 66 04/10/25 14:38   Resp 19 04/10/25 14:38   SpO2 96 % 04/10/25 14:38   Vitals shown include unfiled device data.    RE DO RFA/VOM/ETOH RFA    Patient Location: PACU    Anesthesia Type: general    Airway Patency: patent and extubated    Postop Pain Control: adequate    Mental Status: mildly sedated but able to meaningfully participate in the post-anesthesia evaluation    Nausea/Vomiting: none    Cardiopulmonary/Hydration status: stable euvolemic    Complications: no apparent anesthesia related complications    Postop vital signs: stable    Dental Exam: Unchanged from Preop    Patient to be discharged from PACU when criteria met.

## 2025-04-10 NOTE — ANESTHESIA PROCEDURE NOTES
Airway  Date/Time: 4/10/2025 7:21 AM  Reason: elective      General Information and Staff   Patient location during procedure: OR  Anesthesiologist: Héctor Alonzo MD  Performed: anesthesiologist   Performed by: Héctor Alonzo MD  Authorized by: Héctor Alonzo MD        Indications and Patient Condition  Indications for airway management: anesthesia  Sedation level: deep      Preoxygenated: yesPatient position: sniffing    Mask difficulty assessment: 1 - vent by mask    Final Airway Details    Final airway type: endotracheal airway    Successful airway: ETT  Cuffed: yes   Successful intubation technique: Video laryngoscopy  Facilitating devices/methods: intubating stylet  Endotracheal tube insertion site: oral  Blade: GlideScope  Blade size: #3  ETT size (mm): 7.5    Cormack-Lehane Classification: grade IIA - partial view of glottis  Placement verified by: capnometry   Measured from: lips  ETT to lips (cm): 24  Number of attempts at approach: 1  Number of other approaches attempted: 0

## 2025-04-11 VITALS
HEART RATE: 75 BPM | DIASTOLIC BLOOD PRESSURE: 69 MMHG | TEMPERATURE: 98 F | RESPIRATION RATE: 23 BRPM | OXYGEN SATURATION: 100 % | SYSTOLIC BLOOD PRESSURE: 119 MMHG | HEIGHT: 75 IN | WEIGHT: 315 LBS | BODY MASS INDEX: 39.17 KG/M2

## 2025-04-11 RX ORDER — FLECAINIDE ACETATE 50 MG/1
50 TABLET ORAL 2 TIMES DAILY
Status: DISCONTINUED | OUTPATIENT
Start: 2025-04-11 | End: 2025-04-11

## 2025-04-11 RX ORDER — KETOROLAC TROMETHAMINE 30 MG/ML
30 INJECTION, SOLUTION INTRAMUSCULAR; INTRAVENOUS ONCE
Status: COMPLETED | OUTPATIENT
Start: 2025-04-11 | End: 2025-04-11

## 2025-04-11 RX ORDER — COLCHICINE 0.6 MG/1
0.6 TABLET ORAL 2 TIMES DAILY
Qty: 14 TABLET | Refills: 0 | Status: SHIPPED | OUTPATIENT
Start: 2025-04-11

## 2025-04-11 RX ORDER — METOPROLOL SUCCINATE 50 MG/1
50 TABLET, EXTENDED RELEASE ORAL 2 TIMES DAILY
Status: DISCONTINUED | OUTPATIENT
Start: 2025-04-11 | End: 2025-04-11

## 2025-04-11 RX ADMIN — KETOROLAC TROMETHAMINE 30 MG: 30 INJECTION, SOLUTION INTRAMUSCULAR; INTRAVENOUS at 12:13:00

## 2025-04-11 RX ADMIN — FLECAINIDE ACETATE 50 MG: 50 TABLET ORAL at 10:49:00

## 2025-04-11 RX ADMIN — METOPROLOL SUCCINATE 50 MG: 50 TABLET, EXTENDED RELEASE ORAL at 10:49:00

## 2025-04-11 NOTE — PROGRESS NOTES
NURSING DISCHARGE NOTE    Discharged Home via Wheelchair.  Accompanied by Spouse  Belongings Taken by patient/family.    Telemetry discontinued. IV removed, IV catheter intact. Groin soft, no hematoma noted. Discharge teaching completed, pt verbalized understanding. Pt left unit with all belongings and in no signs or symptoms of distress.

## 2025-04-11 NOTE — PLAN OF CARE
Patient alert and oriented x4. RA. Normal sinus rhythm on tele. R groin site soft, no hematoma noted. Old drainage marked on gauze. Denies pain or discomfort. Pt updated on plan of care, verbalized understanding. Call light within reach.    1015: ambulating in lloyd, groin soft and intact.    Problem: CARDIOVASCULAR - ADULT  Goal: Maintains optimal cardiac output and hemodynamic stability  Description: INTERVENTIONS:- Monitor vital signs, rhythm, and trends- Monitor for bleeding, hypotension and signs of decreased cardiac output- Evaluate effectiveness of vasoactive medications to optimize hemodynamic stability- Monitor arterial and/or venous puncture sites for bleeding and/or hematoma- Assess quality of pulses, skin color and temperature- Assess for signs of decreased coronary artery perfusion - ex. Angina- Evaluate fluid balance, assess for edema, trend weights  Outcome: Progressing  Goal: Absence of cardiac arrhythmias or at baseline  Description: INTERVENTIONS:- Continuous cardiac monitoring, monitor vital signs, obtain 12 lead EKG if indicated- Evaluate effectiveness of antiarrhythmic and heart rate control medications as ordered- Initiate emergency measures for life threatening arrhythmias- Monitor electrolytes and administer replacement therapy as ordered  Outcome: Progressing

## 2025-04-11 NOTE — PROGRESS NOTES
Progress Note  Josep Frost Patient Status:  Outpatient in a Bed    1965 MRN [id number]   Location [facility] 8NE-A Attending Jovanny Figueroa MD   Hosp Day # 0 PCP NEELIMA JEFFERSON     Subjective   No cardiac complaints this am. Groin site soft, nontender, no hematoma. Wife at bedside. Post ablation discharge instructions discussed in details, he v/u. [facility]       Objective:   /65 (BP Location: Right arm)   Pulse 75   Temp 97.8 °F (36.6 °C) (Oral)   Resp 24   Ht 6' 3\" (1.905 m)   Wt (!) 380 lb (172.4 kg)   SpO2 98%   BMI 47.50 kg/m²     Telemetry: sinus rhythm     Intake/Output:    Intake/Output Summary (Last 24 hours) at 2025 0950  Last data filed at 2025 0828  Gross per 24 hour   Intake 2090 ml   Output 150 ml   Net 1940 ml       Wt Readings from Last 3 Encounters:   25 (!) 380 lb (172.4 kg)         Review of Systems:     10 point review of systems completed and negative except as noted in HPI      Exam:     Physical Exam:  General: Alert and oriented x 3. No apparent distress.   HEENT: Normocephalic, neck supple, no thyromegaly or adenopathy.  Neck: No JVD, carotids 2+, no bruits.  Cardiac: Regular rate and rhythm. S1, S2 normal. No murmur, pericardial rub, S3, or extra cardiac sounds.  Lungs: Clear without wheezes, rales, rhonchi or dullness.  Normal excursions and effort.  Abdomen: Soft, non-tender. BS-present.  Extremities: Without clubbing or cyanosis. No edema.    Neurologic: Alert and oriented, normal affect. No focal defects  Skin: Warm and dry.       Assessment and Plan:     Assessment:  # hx of persistent atrial fibrillation / flutter with recurrence   Status post successful LA posterior wall isolation with PFA and with VOM ETOH ablation for atypical atrial flutter. There was an additional atrial flutter that was in a figure of 8 pattern that was terminated with ablation on the posterior floor adjacent to the RIPV.   Status post successful ablation for  typical cavo-tricuspid isthmus dependent atrial flutter.  Continue current cardiac meds, AC with Xarelto   # morbid obesity, BMI 47.5   Weight loss highly encouraged   # EL - on CPAP at home       Plan:  Underwent successful ablation yesterday, admitted for observation d/t prolonged groin bleed that has resolved overnight, this morning groin site soft, nontender, no hematoma.   Out of bed to ambulate   Continue current home meds  Ok to discharge home of groin site stable with ambulation     Plan of care discussed with patient, RN.      Lashanda Valdez, APRN  4/11/2025  Ph [phone] ([place])  Ph [phone] ([employer])       Chart reveiwed and decision making performed in entirety with discussion with staff. Agree with above note and assessment with the following additions made below.     General: No acute distress.   HEENT- NCAT,   CVS- normal S1, S2  Lungs- clear bilaterally.   Abdomen- soft, non-tender.  Extremities- Equal pulses, no edema.   30 mg of toradol x1, then ok for discharge.   Will add colchicine 0.6 mg BID for 7 days.   Continue current medications.   Follow up with me in a few weeks, then resume care with Dr. Vasquez.     Jovanny Figueroa MD  [facility]  Cardiac Electrophysiolgy

## 2025-04-11 NOTE — DISCHARGE INSTRUCTIONS
HOME CARE INSTRUCTIONS FOLLOWING CARDIAC ABLATION (RFA) OR ELECTROPHYSIOLOGIC STUDY (EPS)    Activity:    - DO NOT drive after the procedure. You may resume driving late the following day according to the nurse or physician's instructions.  - Plan on resting and relaxing tonight and tomorrow. It will be normal to tire easily for the first few days, depending on the length of the procedure and the amount of sedation you received.   - DO NOT lift anything over 10 pounds for the next 24 hours.  - Avoid sexual activity for the next 24 hours.  - Avoid repeated stair use and excessive walking for the next 24 hours.   - Avoid drinking alcohol for the next 24 hours.  - Resume your normal activity after 48 hours, or as instructed by your physician.      What is Normal:    - You may feel extra heart beats. If these beats come too often or you feel an episode of multiple fast heartbeats, notify your physician.  - The procedure site may appear bruised or discolored.  - There may be a small amount of drainage on the bandage  - There may be mild tenderness to the procedure site when touched, which is common.       Special Instructions:    - The bandage is to remain in place for 24 hours. Keep the bandage clean and dry. Do not submerge the site for 72 hours (no tub, baths or pools).  - After 24 hours you must remove the bandage. Wash the procedure site gently with soap and water. If you choose to wear a bandaid for a few days, make sure it remains clean and dry and that it is changed daily.  - The day after the procedure you may shower after you remove your dressing (but not baths).      When you should NOTIFY YOUR PHYSICIAN:    - If you have shortness of breath or a persistent cough  - If you have chest pain (angina)  - If you have persistent pain at the procedure site  - If you experience a fever with a temperature >101 degrees, chills, infection (redness, swelling, thick yellow drainage, or a foul odor from procedure  site)      Other:    - You may resume your present diet, unless otherwise directed by your physician  - You may resume all of your medications as prescribed, unless otherwise directed by your physician. A list of your medications was provided to you at discharge.

## 2025-04-11 NOTE — PLAN OF CARE
Assumed care of pt at 1605. Groin soft, no hematoma noted. Patient alert and oriented x4. 3L NC. Wife at bedside. Normal sinus rhythm on tele. Denies pain or discomfort. Pt updated on plan of care, verbalized understanding. Call light within reach.    1630: groin bleeding, manual pressure applied for 20 min.  1700: groin oozing after pressure released and quick clot applied. Pressure held. Cards notified.  1730: femstop applied  1835: femstop removed. Old drainage marked. Soft, no hematoma noted.    Problem: CARDIOVASCULAR - ADULT  Goal: Maintains optimal cardiac output and hemodynamic stability  Description: INTERVENTIONS:- Monitor vital signs, rhythm, and trends- Monitor for bleeding, hypotension and signs of decreased cardiac output- Evaluate effectiveness of vasoactive medications to optimize hemodynamic stability- Monitor arterial and/or venous puncture sites for bleeding and/or hematoma- Assess quality of pulses, skin color and temperature- Assess for signs of decreased coronary artery perfusion - ex. Angina- Evaluate fluid balance, assess for edema, trend weights  Outcome: Progressing  Goal: Absence of cardiac arrhythmias or at baseline  Description: INTERVENTIONS:- Continuous cardiac monitoring, monitor vital signs, obtain 12 lead EKG if indicated- Evaluate effectiveness of antiarrhythmic and heart rate control medications as ordered- Initiate emergency measures for life threatening arrhythmias- Monitor electrolytes and administer replacement therapy as ordered  Outcome: Progressing

## 2025-05-09 RX ORDER — FUROSEMIDE 20 MG/1
20 TABLET ORAL DAILY
COMMUNITY

## 2025-05-09 NOTE — PAT NURSING NOTE
Called and spoke with pt regarding PAT call. He confirmed he has not missed any Xarelto doses in the last 30 days. He does not use my chart. We reviewed instructions and he said he did not need to wrote them down because he has been threw this procedure a few times before.    Will get another phone call the day before w/ TOA. Check in at  Reg desk at [facility]. NPO after midnoc. Do not miss any Xarelto doses. The morning of procedure do not take Furosemide and vitamins/supplements. May take other meds w/ SOW. Pt has  home. He did not have questions and v/u.

## 2025-05-13 ENCOUNTER — HOSPITAL ENCOUNTER (OUTPATIENT)
Dept: INTERVENTIONAL RADIOLOGY/VASCULAR | Facility: HOSPITAL | Age: 60
Discharge: HOME | End: 2025-05-13
Attending: INTERNAL MEDICINE | Admitting: INTERNAL MEDICINE
Payer: COMMERCIAL

## 2025-05-13 VITALS
WEIGHT: 315 LBS | DIASTOLIC BLOOD PRESSURE: 84 MMHG | TEMPERATURE: 97 F | HEART RATE: 64 BPM | BODY MASS INDEX: 39.17 KG/M2 | OXYGEN SATURATION: 96 % | RESPIRATION RATE: 12 BRPM | HEIGHT: 75 IN | SYSTOLIC BLOOD PRESSURE: 113 MMHG

## 2025-05-13 DIAGNOSIS — I48.91 A-FIB (HCC): ICD-10-CM

## 2025-05-13 PROCEDURE — 93005 ELECTROCARDIOGRAM TRACING: CPT

## 2025-05-13 PROCEDURE — 92960 CARDIOVERSION ELECTRIC EXT: CPT | Performed by: INTERNAL MEDICINE

## 2025-05-13 PROCEDURE — 93010 ELECTROCARDIOGRAM REPORT: CPT | Performed by: INTERNAL MEDICINE

## 2025-05-13 RX ORDER — METHOHEXITAL IN WATER/PF 100MG/10ML
SYRINGE (ML) INTRAVENOUS
Status: DISCONTINUED
Start: 2025-05-13 | End: 2025-05-13 | Stop reason: WASHOUT

## 2025-05-13 RX ORDER — METHOHEXITAL IN WATER/PF 100MG/10ML
100 SYRINGE (ML) INTRAVENOUS ONCE
Status: DISCONTINUED | OUTPATIENT
Start: 2025-05-13 | End: 2025-05-13

## 2025-05-13 RX ORDER — METHOHEXITAL IN WATER/PF 100MG/10ML
SYRINGE (ML) INTRAVENOUS
Status: COMPLETED
Start: 2025-05-13 | End: 2025-05-13

## 2025-05-13 RX ORDER — SODIUM CHLORIDE 9 MG/ML
INJECTION, SOLUTION INTRAVENOUS CONTINUOUS
Status: DISCONTINUED | OUTPATIENT
Start: 2025-05-13 | End: 2025-05-13

## 2025-05-13 NOTE — PROCEDURES
PROCEDURE(S) PERFORMED:    1.    Cardioversion.  2.     Sedation     :  Jovanny Dumont MD     ANESTHESIA:  IV sedation.     PRE-Op  Persistent atrial fibrillation.  POST-Op Sinus rhythm  COMPLICATIONS:  None.     METHODS:  The patient was brought to the outpatient cardiac telemetry unit in a fasting and nonsedated state after providing informed consent.  IV sedation was administered during continuous ECG, pulse oximeter and noninvasive hemodynamic monitoring.  After administering a total of 100 mg of IV brevital in intermittent boluses, the desired level of sedation was achieved.  A single 360-joule synchronized  shock was delivered with successful conversion to sinus rhythm. The patient remained on telemetry until she was fully recovered.  There were no complications.     CONCLUSIONS:  1.    Successful cardioversion.     ________________________________  OMAR DUMONT MD

## 2025-05-13 NOTE — H&P
[facility]  H&P    Josep Frost Patient Status:  Outpatient in a Bed    1965 MRN [id number]   Location [facility] INTERVENTIONAL SUITES Attending Jovanny Figueroa MD   Hosp Day # 0 PCP NEELIMA JEFFERSON     Reason for Admission:  Pt presents for cardioversion.    Assessment/Plan:  Problem List[1]    Pt presents for cardioversion. Pt has been therapeutic anticoagulation for the appropriate amount of time, uninterrupted. Discussed risks, benefits and alternatives to sedation and cardioversion.     History of Present Illness:  Josep Frost is a a(n) 60 year old male. Presents for cardioversion.    History:  Past Medical History[2]  Past Surgical History[3]  Family History[4]   reports that he has never smoked. He has never used smokeless tobacco. He reports that he does not currently use alcohol. He reports that he does not use drugs.    Allergies:  Allergies[5]    Medications:  Current Hospital Medications[6]    Review of Systems:  All systems were reviewed and are negative except as described above in HPI.    Physical Exam:  Blood pressure 116/81, pulse 61, temperature 97 °F (36.1 °C), resp. rate 21, height 75\", weight (!) 380 lb (172.4 kg), SpO2 93%.  Temp (24hrs), Av °F (36.1 °C), Min:97 °F (36.1 °C), Max:97 °F (36.1 °C)    Wt Readings from Last 3 Encounters:   25 (!) 380 lb (172.4 kg)   25 (!) 380 lb (172.4 kg)         General: Alert and oriented in no apparent distress.  HEENT: No focal deficits.  Neck: No JVD, carotids 2+ no bruits.  Cardiac: Regular rate and rhythm, S1, S2 normal, no murmur, rub or gallop.  Lungs: Clear without wheezes, rales, rhonchi or dullness.  Normal excursions and effort.  Abdomen: Soft, non-tender.   Extremities: Without clubbing, cyanosis or edema.  Peripheral pulses are 2+.  Neurologic: Alert and oriented, normal affect.  Skin: Warm and dry.     Laboratories and Data:  Diagnostics:    Labs:        No results found  for: \"PT\", \"INR\"      Jovanny Figueroa MD  5/13/2025  2:48 PM    Jovanny Figueroa MD  [facility]  Cardiac Electrophysiolgy             [1] There is no problem list on file for this patient.  [2]   Past Medical History:   Arrhythmia   [3]   Past Surgical History:  Procedure Laterality Date    Ndsc ablation & rcnstj atria lmtd w/o bypass     [4] History reviewed. No pertinent family history.  [5]   Allergies  Allergen Reactions    Hydrocodone-Acetaminophen SHORTNESS OF BREATH     Vicodin   [6]   Current Facility-Administered Medications:     sodium chloride 0.9% infusion, , Intravenous, Continuous    methohexital (BrevITAL) 100 mg/10mL IV syringe 100 mg, 100 mg, Intravenous, Once

## 2025-05-13 NOTE — PROGRESS NOTES
S/p successful cardioversion. Vss. Pt tolerated po well. EKG obtained. Discharge instructions reviewed w pt ans spouse. No questions. IV d.cd and pt discharged to home in stable condition via wheelchair.

## 2025-05-14 LAB
ATRIAL RATE: 63 BPM
P AXIS: 78 DEGREES
P-R INTERVAL: 214 MS
Q-T INTERVAL: 446 MS
QRS DURATION: 134 MS
QTC CALCULATION (BEZET): 456 MS
R AXIS: 87 DEGREES
T AXIS: 59 DEGREES
VENTRICULAR RATE: 63 BPM

## 2025-06-13 ENCOUNTER — APPOINTMENT (OUTPATIENT)
Dept: CT IMAGING | Facility: HOSPITAL | Age: 60
End: 2025-06-13
Attending: STUDENT IN AN ORGANIZED HEALTH CARE EDUCATION/TRAINING PROGRAM
Payer: COMMERCIAL

## 2025-06-13 ENCOUNTER — APPOINTMENT (OUTPATIENT)
Dept: GENERAL RADIOLOGY | Facility: HOSPITAL | Age: 60
End: 2025-06-13
Attending: STUDENT IN AN ORGANIZED HEALTH CARE EDUCATION/TRAINING PROGRAM
Payer: COMMERCIAL

## 2025-06-13 ENCOUNTER — APPOINTMENT (OUTPATIENT)
Dept: GENERAL RADIOLOGY | Facility: HOSPITAL | Age: 60
End: 2025-06-13
Attending: EMERGENCY MEDICINE
Payer: COMMERCIAL

## 2025-06-13 ENCOUNTER — APPOINTMENT (OUTPATIENT)
Dept: CV DIAGNOSTICS | Facility: HOSPITAL | Age: 60
End: 2025-06-13
Attending: INTERNAL MEDICINE
Payer: COMMERCIAL

## 2025-06-13 ENCOUNTER — HOSPITAL ENCOUNTER (INPATIENT)
Facility: HOSPITAL | Age: 60
LOS: 9 days | Discharge: HOME HEALTH | End: 2025-06-22
Attending: STUDENT IN AN ORGANIZED HEALTH CARE EDUCATION/TRAINING PROGRAM | Admitting: STUDENT IN AN ORGANIZED HEALTH CARE EDUCATION/TRAINING PROGRAM
Payer: COMMERCIAL

## 2025-06-13 ENCOUNTER — HOSPITAL ENCOUNTER (INPATIENT)
Facility: HOSPITAL | Age: 60
LOS: 9 days | Discharge: HOME | End: 2025-06-22
Attending: STUDENT IN AN ORGANIZED HEALTH CARE EDUCATION/TRAINING PROGRAM | Admitting: STUDENT IN AN ORGANIZED HEALTH CARE EDUCATION/TRAINING PROGRAM
Payer: COMMERCIAL

## 2025-06-13 DIAGNOSIS — N17.9 AKI (ACUTE KIDNEY INJURY): ICD-10-CM

## 2025-06-13 DIAGNOSIS — G47.33 OSA (OBSTRUCTIVE SLEEP APNEA): ICD-10-CM

## 2025-06-13 DIAGNOSIS — J96.01 ACUTE RESPIRATORY FAILURE WITH HYPOXIA (HCC): ICD-10-CM

## 2025-06-13 DIAGNOSIS — I46.9 CARDIAC ARREST (HCC): Primary | ICD-10-CM

## 2025-06-13 DIAGNOSIS — J96.22 ACUTE ON CHRONIC RESPIRATORY FAILURE WITH HYPERCAPNIA (HCC): ICD-10-CM

## 2025-06-13 PROBLEM — I21.4 NSTEMI (NON-ST ELEVATED MYOCARDIAL INFARCTION) (HCC): Status: ACTIVE | Noted: 2025-06-13

## 2025-06-13 PROBLEM — E66.01 MORBID OBESITY (HCC): Status: ACTIVE | Noted: 2025-06-13

## 2025-06-13 PROBLEM — I48.91 ATRIAL FIBRILLATION (HCC): Status: ACTIVE | Noted: 2025-06-13

## 2025-06-13 LAB
ALBUMIN SERPL-MCNC: 4.1 G/DL (ref 3.2–4.8)
ALBUMIN/GLOB SERPL: 1.8 {RATIO} (ref 1–2)
ALP LIVER SERPL-CCNC: 161 U/L (ref 45–117)
ALT SERPL-CCNC: 34 U/L (ref 10–49)
AMMONIA PLAS-MCNC: 86 UMOL/L (ref 11–32)
ANION GAP SERPL CALC-SCNC: 12 MMOL/L (ref 0–18)
ANION GAP SERPL CALC-SCNC: 16 MMOL/L (ref 0–18)
APTT PPP: 26.1 SECONDS (ref 23–36)
ARTERIAL PATENCY WRIST A: POSITIVE
AST SERPL-CCNC: 41 U/L (ref ?–34)
BASE EXCESS BLDA CALC-SCNC: -4.8 MMOL/L (ref ?–2)
BASE EXCESS BLDA CALC-SCNC: -5.6 MMOL/L (ref ?–2)
BASOPHILS # BLD: 0 X10(3) UL (ref 0–0.2)
BASOPHILS NFR BLD: 0 %
BILIRUB SERPL-MCNC: 0.5 MG/DL (ref 0.2–1.1)
BODY TEMPERATURE: 98.6 F
BODY TEMPERATURE: 98.6 F
BUN BLD-MCNC: 18 MG/DL (ref 9–23)
BUN BLD-MCNC: 20 MG/DL (ref 9–23)
CA-I BLD-SCNC: 1.08 MMOL/L (ref 0.95–1.32)
CA-I BLD-SCNC: 1.25 MMOL/L (ref 0.95–1.32)
CALCIUM BLD-MCNC: 10 MG/DL (ref 8.7–10.6)
CALCIUM BLD-MCNC: 8.6 MG/DL (ref 8.7–10.6)
CHLORIDE SERPL-SCNC: 101 MMOL/L (ref 98–112)
CHLORIDE SERPL-SCNC: 102 MMOL/L (ref 98–112)
CHOLEST SERPL-MCNC: 171 MG/DL (ref ?–200)
CO2 SERPL-SCNC: 26 MMOL/L (ref 21–32)
CO2 SERPL-SCNC: 30 MMOL/L (ref 21–32)
COHGB MFR BLD: 1.8 % SAT (ref 0–3)
COHGB MFR BLD: 2.2 % SAT (ref 0–3)
CREAT BLD-MCNC: 1.61 MG/DL (ref 0.7–1.3)
CREAT BLD-MCNC: 1.83 MG/DL (ref 0.7–1.3)
EGFRCR SERPLBLD CKD-EPI 2021: 42 ML/MIN/1.73M2 (ref 60–?)
EGFRCR SERPLBLD CKD-EPI 2021: 49 ML/MIN/1.73M2 (ref 60–?)
EOSINOPHIL # BLD: 0 X10(3) UL (ref 0–0.7)
EOSINOPHIL NFR BLD: 0 %
ERYTHROCYTE [DISTWIDTH] IN BLOOD BY AUTOMATED COUNT: 13.2 %
FIO2: 100 %
FIO2: 60 %
GLOBULIN PLAS-MCNC: 2.3 G/DL (ref 2–3.5)
GLUCOSE BLD-MCNC: 201 MG/DL (ref 70–99)
GLUCOSE BLD-MCNC: 225 MG/DL (ref 70–99)
GLUCOSE BLD-MCNC: 233 MG/DL (ref 70–99)
HCO3 BLDA-SCNC: 20.6 MEQ/L (ref 21–27)
HCO3 BLDA-SCNC: 21.1 MEQ/L (ref 21–27)
HCT VFR BLD AUTO: 47.3 % (ref 39–53)
HDLC SERPL-MCNC: 34 MG/DL (ref 40–59)
HGB BLD-MCNC: 15.6 G/DL (ref 13–17.5)
HGB BLD-MCNC: 15.6 G/DL (ref 13–17.5)
HGB BLD-MCNC: 15.9 G/DL (ref 13–17.5)
INR BLD: 1.41 (ref 0.8–1.2)
LACTATE BLD-SCNC: 6 MMOL/L (ref 0.5–2)
LACTATE BLD-SCNC: 7.1 MMOL/L (ref 0.5–2)
LACTATE SERPL-SCNC: 4.7 MMOL/L (ref 0.5–2)
LACTATE SERPL-SCNC: 5.2 MMOL/L (ref 0.5–2)
LACTATE SERPL-SCNC: 9 MMOL/L (ref 0.5–2)
LDLC SERPL CALC-MCNC: 91 MG/DL (ref ?–100)
LIPASE SERPL-CCNC: 66 U/L (ref 12–53)
LYMPHOCYTES NFR BLD: 34 %
LYMPHOCYTES NFR BLD: 8.13 X10(3) UL (ref 1–4)
MAGNESIUM SERPL-MCNC: 2.1 MG/DL (ref 1.6–2.6)
MCH RBC QN AUTO: 30.7 PG (ref 26–34)
MCHC RBC AUTO-ENTMCNC: 33 G/DL (ref 31–37)
MCV RBC AUTO: 93.1 FL (ref 80–100)
METAMYELOCYTES # BLD: 1.43 X10(3) UL (ref ?–0.01)
METAMYELOCYTES NFR BLD: 6 %
METHGB MFR BLD: 0.7 % SAT (ref 0.4–1.5)
METHGB MFR BLD: 0.9 % SAT (ref 0.4–1.5)
MONOCYTES # BLD: 0.96 X10(3) UL (ref 0.1–1)
MONOCYTES NFR BLD: 4 %
MORPHOLOGY: NORMAL
MRSA DNA SPEC QL NAA+PROBE: NEGATIVE
MYELOCYTES # BLD: 0.48 X10(3) UL (ref ?–0.01)
MYELOCYTES NFR BLD: 2 %
NEUTROPHILS # BLD AUTO: 14.46 X10 (3) UL (ref 1.5–7.7)
NEUTROPHILS NFR BLD: 46 %
NEUTS BAND NFR BLD: 8 %
NEUTS HYPERSEG # BLD: 12.91 X10(3) UL (ref 1.5–7.7)
NONHDLC SERPL-MCNC: 137 MG/DL (ref ?–130)
NRBC BLD MANUAL-RTO: 1 % (ref ?–1)
OSMOLALITY SERPL CALC.SUM OF ELEC: 306 MOSM/KG (ref 275–295)
OSMOLALITY SERPL CALC.SUM OF ELEC: 306 MOSM/KG (ref 275–295)
OXYHGB MFR BLDA: 94.5 % (ref 92–100)
OXYHGB MFR BLDA: 97.5 % (ref 92–100)
PCO2 BLDA: 53 MM HG (ref 35–45)
PCO2 BLDA: 86 MM HG (ref 35–45)
PEEP: 10 CM H2O
PEEP: 10 CM H2O
PH BLDA: 7.1 [PH] (ref 7.35–7.45)
PH BLDA: 7.25 [PH] (ref 7.35–7.45)
PLATELET # BLD AUTO: 245 10(3)UL (ref 150–450)
PLATELET MORPHOLOGY: NORMAL
PO2 BLDA: 224 MM HG (ref 80–100)
PO2 BLDA: 85 MM HG (ref 80–100)
POTASSIUM BLD-SCNC: 3.6 MMOL/L (ref 3.6–5.1)
POTASSIUM BLD-SCNC: 4.6 MMOL/L (ref 3.6–5.1)
POTASSIUM SERPL-SCNC: 2.9 MMOL/L (ref 3.5–5.1)
POTASSIUM SERPL-SCNC: 4.2 MMOL/L (ref 3.5–5.1)
PROT SERPL-MCNC: 6.4 G/DL (ref 5.7–8.2)
PROTHROMBIN TIME: 17.4 SECONDS (ref 11.6–14.8)
RBC # BLD AUTO: 5.08 X10(6)UL (ref 4.3–5.7)
SODIUM BLD-SCNC: 135 MMOL/L (ref 135–145)
SODIUM BLD-SCNC: 137 MMOL/L (ref 135–145)
SODIUM SERPL-SCNC: 143 MMOL/L (ref 136–145)
SODIUM SERPL-SCNC: 144 MMOL/L (ref 136–145)
TIDAL VOLUME: 550 ML
TIDAL VOLUME: 550 ML
TOTAL CELLS COUNTED BLD: 100
TRIGL SERPL-MCNC: 277 MG/DL (ref 30–149)
TROPONIN I SERPL HS-MCNC: 105 NG/L (ref ?–53)
TROPONIN I SERPL HS-MCNC: 1120 NG/L (ref ?–53)
VENT RATE: 20 /MIN
VENT RATE: 24 /MIN
VLDLC SERPL CALC-MCNC: 45 MG/DL (ref 0–30)
WBC # BLD AUTO: 23.9 X10(3) UL (ref 4–11)

## 2025-06-13 PROCEDURE — 93321 DOPPLER ECHO F-UP/LMTD STD: CPT | Performed by: INTERNAL MEDICINE

## 2025-06-13 PROCEDURE — 5A12012 PERFORMANCE OF CARDIAC OUTPUT, SINGLE, MANUAL: ICD-10-PCS | Performed by: STUDENT IN AN ORGANIZED HEALTH CARE EDUCATION/TRAINING PROGRAM

## 2025-06-13 PROCEDURE — 93325 DOPPLER ECHO COLOR FLOW MAPG: CPT | Performed by: INTERNAL MEDICINE

## 2025-06-13 PROCEDURE — 71275 CT ANGIOGRAPHY CHEST: CPT | Performed by: STUDENT IN AN ORGANIZED HEALTH CARE EDUCATION/TRAINING PROGRAM

## 2025-06-13 PROCEDURE — 93308 TTE F-UP OR LMTD: CPT | Performed by: INTERNAL MEDICINE

## 2025-06-13 PROCEDURE — 36620 INSERTION CATHETER ARTERY: CPT | Performed by: INTERNAL MEDICINE

## 2025-06-13 PROCEDURE — 3E033XZ INTRODUCTION OF VASOPRESSOR INTO PERIPHERAL VEIN, PERCUTANEOUS APPROACH: ICD-10-PCS | Performed by: STUDENT IN AN ORGANIZED HEALTH CARE EDUCATION/TRAINING PROGRAM

## 2025-06-13 PROCEDURE — 71045 X-RAY EXAM CHEST 1 VIEW: CPT | Performed by: STUDENT IN AN ORGANIZED HEALTH CARE EDUCATION/TRAINING PROGRAM

## 2025-06-13 PROCEDURE — B543ZZA ULTRASONOGRAPHY OF RIGHT JUGULAR VEINS, GUIDANCE: ICD-10-PCS | Performed by: INTERNAL MEDICINE

## 2025-06-13 PROCEDURE — 36556 INSERT NON-TUNNEL CV CATH: CPT | Performed by: INTERNAL MEDICINE

## 2025-06-13 PROCEDURE — 5A1945Z RESPIRATORY VENTILATION, 24-96 CONSECUTIVE HOURS: ICD-10-PCS | Performed by: STUDENT IN AN ORGANIZED HEALTH CARE EDUCATION/TRAINING PROGRAM

## 2025-06-13 PROCEDURE — 05HM33Z INSERTION OF INFUSION DEVICE INTO RIGHT INTERNAL JUGULAR VEIN, PERCUTANEOUS APPROACH: ICD-10-PCS | Performed by: INTERNAL MEDICINE

## 2025-06-13 PROCEDURE — 70450 CT HEAD/BRAIN W/O DYE: CPT | Performed by: STUDENT IN AN ORGANIZED HEALTH CARE EDUCATION/TRAINING PROGRAM

## 2025-06-13 PROCEDURE — 99223 1ST HOSP IP/OBS HIGH 75: CPT | Performed by: STUDENT IN AN ORGANIZED HEALTH CARE EDUCATION/TRAINING PROGRAM

## 2025-06-13 PROCEDURE — 99291 CRITICAL CARE FIRST HOUR: CPT | Performed by: EMERGENCY MEDICINE

## 2025-06-13 PROCEDURE — 0BH17EZ INSERTION OF ENDOTRACHEAL AIRWAY INTO TRACHEA, VIA NATURAL OR ARTIFICIAL OPENING: ICD-10-PCS | Performed by: STUDENT IN AN ORGANIZED HEALTH CARE EDUCATION/TRAINING PROGRAM

## 2025-06-13 PROCEDURE — 71045 X-RAY EXAM CHEST 1 VIEW: CPT | Performed by: EMERGENCY MEDICINE

## 2025-06-13 RX ORDER — CALCIUM CHLORIDE 100 MG/ML
INJECTION INTRAVENOUS; INTRAVENTRICULAR
Status: COMPLETED | OUTPATIENT
Start: 2025-06-13 | End: 2025-06-13

## 2025-06-13 RX ORDER — BENZONATATE 100 MG/1
200 CAPSULE ORAL 3 TIMES DAILY PRN
Status: DISCONTINUED | OUTPATIENT
Start: 2025-06-13 | End: 2025-06-22

## 2025-06-13 RX ORDER — ACETAMINOPHEN 500 MG
1000 TABLET ORAL EVERY 8 HOURS PRN
Status: DISCONTINUED | OUTPATIENT
Start: 2025-06-13 | End: 2025-06-19

## 2025-06-13 RX ORDER — BISACODYL 10 MG
10 SUPPOSITORY, RECTAL RECTAL
Status: DISCONTINUED | OUTPATIENT
Start: 2025-06-13 | End: 2025-06-22

## 2025-06-13 RX ORDER — BUSPIRONE HYDROCHLORIDE 15 MG/1
30 TABLET ORAL EVERY 8 HOURS
Status: DISCONTINUED | OUTPATIENT
Start: 2025-06-13 | End: 2025-06-15

## 2025-06-13 RX ORDER — HYDROCORTISONE SODIUM SUCCINATE 100 MG/2ML
50 INJECTION INTRAMUSCULAR; INTRAVENOUS EVERY 6 HOURS
Status: COMPLETED | OUTPATIENT
Start: 2025-06-13 | End: 2025-06-13

## 2025-06-13 RX ORDER — ERGOCALCIFEROL 1.25 MG/1
50000 CAPSULE, LIQUID FILLED ORAL
COMMUNITY

## 2025-06-13 RX ORDER — SODIUM CHLORIDE, SODIUM LACTATE, POTASSIUM CHLORIDE, CALCIUM CHLORIDE 600; 310; 30; 20 MG/100ML; MG/100ML; MG/100ML; MG/100ML
INJECTION, SOLUTION INTRAVENOUS CONTINUOUS
Status: DISCONTINUED | OUTPATIENT
Start: 2025-06-13 | End: 2025-06-14

## 2025-06-13 RX ORDER — ACETAMINOPHEN 650 MG/1
650 SUPPOSITORY RECTAL EVERY 6 HOURS SCHEDULED
Status: DISCONTINUED | OUTPATIENT
Start: 2025-06-13 | End: 2025-06-15

## 2025-06-13 RX ORDER — ONDANSETRON 2 MG/ML
4 INJECTION INTRAMUSCULAR; INTRAVENOUS EVERY 6 HOURS PRN
Status: DISCONTINUED | OUTPATIENT
Start: 2025-06-13 | End: 2025-06-22

## 2025-06-13 RX ORDER — SENNOSIDES 8.6 MG
17.2 TABLET ORAL NIGHTLY PRN
Status: DISCONTINUED | OUTPATIENT
Start: 2025-06-13 | End: 2025-06-22

## 2025-06-13 RX ORDER — ECHINACEA PURPUREA EXTRACT 125 MG
1 TABLET ORAL
Status: DISCONTINUED | OUTPATIENT
Start: 2025-06-13 | End: 2025-06-22

## 2025-06-13 RX ORDER — ACETAMINOPHEN 160 MG/5ML
650 SOLUTION ORAL EVERY 6 HOURS SCHEDULED
Status: DISCONTINUED | OUTPATIENT
Start: 2025-06-13 | End: 2025-06-15

## 2025-06-13 RX ORDER — ROCURONIUM BROMIDE 10 MG/ML
50 INJECTION, SOLUTION INTRAVENOUS ONCE
Status: COMPLETED | OUTPATIENT
Start: 2025-06-13 | End: 2025-06-13

## 2025-06-13 RX ORDER — PROCHLORPERAZINE EDISYLATE 5 MG/ML
5 INJECTION INTRAMUSCULAR; INTRAVENOUS EVERY 8 HOURS PRN
Status: DISCONTINUED | OUTPATIENT
Start: 2025-06-13 | End: 2025-06-22

## 2025-06-13 RX ORDER — POLYETHYLENE GLYCOL 3350 17 G/17G
17 POWDER, FOR SOLUTION ORAL DAILY PRN
Status: DISCONTINUED | OUTPATIENT
Start: 2025-06-13 | End: 2025-06-22

## 2025-06-13 NOTE — H&P
[facility]     Josep Frost Patient Status:  Emergency    1965 MRN [id number]   Location [facility] EMERGENCY DEPARTMENT Attending Lisset Westbrook MD   Hosp Day # 0 PCP NEELIMA JEFFERSON     Chief Complaint: Cardiac arrest    History of Present Illness: Josep Frost is a 60 year old male with PMHx Afib on anticoagulation who presents for PEA arrest.     Patient intubated and sedated, unable to provide significant history.  All history obtained from ER signout.  Patient noted to be in normal state of health today per patient's wife, when he arrived for outpatient echocardiogram.  Patient received Definity contrast for echocardiogram.  During the procedure he noted to become hypoxic, apneic and was in PEA arrest.  CPR started immediately, status post bicarb x 2, epi x 2, calcium.  Patient wheeled to the ER and subsequently obtained ROSC.    Patient's wife notes that he was in normal state of health prior to echocardiogram today, denies any fevers, chills, chest pain, shortness of breath, abdominal pain.  Does note that he  has history of difficulty control A-fib, recently had ablation procedure in April after which she started developing lower extremity edema and cough.  Afterward had cardioversion on 2025 which was successful and felt like he was in normal sinus rhythm afterwards.      Past Medical History:Past Medical History[1]     Past Surgical History: Past Surgical History[2]    Social History:  reports that he has never smoked. He has never used smokeless tobacco. He reports that he does not currently use alcohol. He reports that he does not use drugs.    Family History: Family History[3]    Allergies: Allergies[4]    Medications:  Medications Ordered Prior to Encounter[5]    Review of Systems:   A comprehensive 14 point review of systems was completed.    Pertinent positives and negatives noted in the HPI.    Physical Exam:    BP (!) 137/110   Pulse (!)  141   Temp 96.7 °F (35.9 °C) (Temporal)   Resp 18   SpO2 100%   General: Intubated and sedated   HEENT: Normocephalic atraumatic. Moist mucous membranes. EOM-I. PERRLA. Anicteric.  Neck: No lymphadenopathy. No JVD. No carotid bruits.  Respiratory: Clear to auscultation bilaterally. No wheezes. No rhonchi.  Cardiovascular: S1, S2. Regular rate and rhythm. No murmurs, rubs or gallops. Equal pulses.   Chest and Back: No tenderness or deformity.  Abdomen: Soft, nontender, nondistended.  Neurologic: Intubated and sedated, no spontaneous extremity movements noted   Extremities: 1+ pitting edema to bilateral shins      Diagnostic Data:      Labs:  No results for input(s): \"WBC\", \"HGB\", \"MCV\", \"PLT\", \"BAND\", \"INR\" in the last 168 hours.    Invalid input(s): \"LYM#\", \"MONO#\", \"BASOS#\", \"EOSIN#\"    No results for input(s): \"GLU\", \"BUN\", \"CREATSERUM\", \"GFRAA\", \"GFRNAA\", \"CA\", \"ALB\", \"NA\", \"K\", \"CL\", \"CO2\", \"ALKPHO\", \"AST\", \"ALT\", \"BILT\", \"TP\" in the last 168 hours.    CrCl cannot be calculated (No successful lab value found.).    No results for input(s): \"PTP\", \"INR\" in the last 168 hours.    No results for input(s): \"TROP\", \"CK\" in the last 168 hours.    Imaging: Imaging data reviewed in Epic.  No results found.      ASSESSMENT / PLAN:     # PEA arrest    - thought 2/2 definity contrast reaction    - on Epi, leovphed ggt   - EKG on admission with Afib with RBBB (seen on prior)   - TTE with limited views, though preserved EF, no tamponade physiology or significant valvular abnormalities     - CT C/A/P pending   - CTH pending    - Empiric IV abx    - vent management per pulm on consult    - close monitoring in CCU; cardiology on consult    - Targeted temp management per critical care     # Afib    - holding home flecanide, metoprolol per cardiology    - holding xarelto currently    - s/p successful cardioversion 5/13/2025   - Cardiology on consult     # NSTEMI, likely type II   - continue trending trops    - cardiology on  consult     # Lactic acidosis - IVF, trend   # ROSEMARY - likely 2/2 arrest, trend chem     # Morbid obesity       Code Status: Not on file    Plan of care discussed with patient, ED physician    Marcial Robertson MD  6/13/2025        Supplementary Documentation:      MDM : Patient's ER labs, imaging reviewed.  AM labs ordered.  ER management discussed with ED physician, decision made for patient to be admitted to the hospital for further medical management.                  [1]   Past Medical History:   Arrhythmia   [2]   Past Surgical History:  Procedure Laterality Date    Ndsc ablation & rcnstj atria lmtd w/o bypass     [3] No family history on file.  [4]   Allergies  Allergen Reactions    Hydrocodone-Acetaminophen SHORTNESS OF BREATH     Vicodin   [5]   Current Facility-Administered Medications on File Prior to Encounter   Medication Dose Route Frequency Provider Last Rate Last Admin    [COMPLETED] methohexital (BrevITAL) 100 mg/10mL IV syringe             [COMPLETED] ketorolac (Toradol) 30 MG/ML injection 30 mg  30 mg Intravenous Once Jovanny Figueroa MD   30 mg at 04/11/25 1213    [COMPLETED] sodium chloride 0.9% infusion   Intravenous On Call Jovanny Figueroa MD   Stopped at 04/10/25 1900    [COMPLETED] heparin (Porcine) 5000 UNIT/ML injection             [COMPLETED] heparin in sodium chloride 0.9% (Porcine) 2 Units/mL flush bag premix             [COMPLETED] heparin (Porcine) 1000 UNIT/ML injection             [COMPLETED] lidocaine-EPINEPHrine (Xylocaine-Epinephrine) 1 %-1:[id number] injection             [COMPLETED] heparin (Porcine) 5000 UNIT/ML injection             [COMPLETED] protamine 10 mg/mL injection             [COMPLETED] ethyl alcohol (Ablysinol) 99% injection             [COMPLETED] Nitroglycerin in D5W 200-5 MCG/ML-% injection             [COMPLETED] heparin (Porcine) 1000 UNIT/ML injection             [COMPLETED] iodixanol (VISIPAQUE) 320 MG/ML injection 100 mL  100 mL Injection ONCE PRN Henry  Jovanny SMITH MD   30 mL at 04/10/25 1432    [COMPLETED] heparin (Porcine) 5000 UNIT/ML injection             [] sodium chloride 0.9% infusion   Intravenous Continuous Jovanny Figueroa MD   Stopped at 04/10/25 1900    [COMPLETED] albuterol (Ventolin) (2.5 MG/3ML) 0.083% nebulizer solution 2.5 mg  2.5 mg Nebulization Once Héctor Alonzo MD   2.5 mg at 04/10/25 1455     Current Outpatient Medications on File Prior to Encounter   Medication Sig Dispense Refill    furosemide 20 MG Oral Tab Take 1 tablet (20 mg total) by mouth daily.      flecainide 50 MG Oral Tab Take 1 tablet (50 mg total) by mouth in the morning and 1 tablet (50 mg total) before bedtime.      metoprolol succinate ER 50 MG Oral Tablet 24 Hr Take 1 tablet (50 mg total) by mouth in the morning and 1 tablet (50 mg total) before bedtime.      rivaroxaban (XARELTO) 20 MG Oral Tab Take 1 tablet (20 mg total) by mouth daily with food.

## 2025-06-13 NOTE — SPIRITUAL CARE NOTE
Spiritual Care Visit Note    Patient Name: Josep Frost Date of Spiritual Care Visit: 25   : 1965 Primary Dx: <principal problem not specified>       Referred By: Referral From: Other (Comment), Nurse    Spiritual Care Taxonomy:    Intended Effects: Convey a calming presence    Methods: Offer support    Interventions: Acknowledge current situation, Active listening, Silent prayer    Visit Type/Summary:     responded to the code. Identified that the spouse is not present; however the staff notified the spouse. Listened to the patient's story/situation/condition through his RN. Quietly offered prayers for the patient, family and our team members. Extended support to our tx team.    Spiritual Care support can be requested via an Epic consult.   For urgent/immediate needs, please contact the On Call  at: Edward: ext [phone]    Rev. Valente Michael BSc., M.Div., M.T.S.,   Certified Grief Counseling Specialist  Advanced Practice Board Certified

## 2025-06-13 NOTE — ED PROVIDER NOTES
Patient Seen in: [facility] Emergency Department        History  Chief Complaint   Patient presents with    Cardiac Arrest     Stated Complaint: Full Arrest    Subjective:   HPI            Patient is a 60-year-old male history of atrial fibrillation coming in from cardiology suite for cardiac arrest.  Patient went for echocardiogram.  He was given a dye and subsequently arrested.  CPR was started immediately and prior to arrival patient was given 4 rounds of epi via IO.  He was also given 2 A of bicarbSupraglottic airway placed by EMS.  ROSC was achieved however upon arrival patient found to be pulseless again.      Objective:     Past Medical History:    Arrhythmia              Past Surgical History:   Procedure Laterality Date    Ndsc ablation & rcnstj atria lmtd w/o bypass                  Social History     Socioeconomic History    Marital status:    Tobacco Use    Smoking status: Never    Smokeless tobacco: Never   Substance and Sexual Activity    Alcohol use: Not Currently    Drug use: Never     Social Drivers of Health     Food Insecurity: No Food Insecurity (4/10/2025)    NCSS - Food Insecurity     Worried About Running Out of Food in the Last Year: No     Ran Out of Food in the Last Year: No   Transportation Needs: No Transportation Needs (4/10/2025)    NCSS - Transportation     Lack of Transportation: No   Housing Stability: Not At Risk (4/10/2025)    NCSS - Housing/Utilities     Has Housing: Yes     Worried About Losing Housing: No     Unable to Get Utilities: No                                Physical Exam    ED Triage Vitals   BP 06/13/25 1645 (!) 125/96   Pulse 06/13/25 1640 (!) 38   Resp 06/13/25 1640 (!) 35   Temp 06/13/25 1704 (!) 96.4 °F (35.8 °C)   Temp src 06/13/25 1704 Temporal   SpO2 06/13/25 1640 (!) 76 %   O2 Device 06/13/25 1715 Ventilator       Current Vitals:   Vital Signs  BP: 117/56  Pulse: 88  Resp: 24  Temp: 96.7 °F (35.9 °C)  Temp src: Temporal  MAP (mmHg): 74    Oxygen  Therapy  SpO2: 100 %  O2 Device: Ventilator  FiO2 (%): (S) 60 %            Physical Exam  Vitals and nursing note reviewed.   Constitutional:       Comments: Unresponsive      HENT:      Head: Normocephalic and atraumatic.      Mouth/Throat:      Comments: Supraglottic airway in place    Eyes:      Conjunctiva/sclera: Conjunctivae normal.   Cardiovascular:      Comments: pulseless  Pulmonary:      Comments: Bvm breath sounds  Abdominal:      Comments: protuberant   Musculoskeletal:      Right lower leg: Edema present.      Left lower leg: Edema present.      Comments: Left tibial IO   Skin:     Comments: Cyanotic, dusky    Neurological:      Comments: Unresponsive                ED Course  Labs Reviewed   CBC WITH DIFFERENTIAL WITH PLATELET - Abnormal; Notable for the following components:       Result Value    WBC 23.9 (*)     Neutrophil Absolute Prelim 14.46 (*)     All other components within normal limits   COMP METABOLIC PANEL (14) - Abnormal; Notable for the following components:    Glucose 201 (*)     Potassium 2.9 (*)     Creatinine 1.61 (*)     Calculated Osmolality 306 (*)     eGFR-Cr 49 (*)     AST 41 (*)     Alkaline Phosphatase 161 (*)     All other components within normal limits   TROPONIN I HIGH SENSITIVITY - Abnormal; Notable for the following components:    Troponin I (High Sensitivity) 105 (*)     All other components within normal limits   PROTHROMBIN TIME (PT) - Abnormal; Notable for the following components:    PT 17.4 (*)     INR 1.41 (*)     All other components within normal limits   LIPASE - Abnormal; Notable for the following components:    Lipase 66 (*)     All other components within normal limits   AMMONIA, PLASMA - Abnormal; Notable for the following components:    Ammonia 86 (*)     All other components within normal limits   LACTIC ACID, PLASMA - Abnormal; Notable for the following components:    Lactic Acid 9.0 (*)     All other components within normal limits   ABG PANEL W ELECT  AND LACTATE - Abnormal; Notable for the following components:    ABG pH 7.10 (*)     ABG pCO2 86 (*)     ABG pO2 224 (*)     ABG HCO3 20.6 (*)     ABG Base Excess -5.6 (*)     Lactic Acid (Blood Gas) 7.1 (*)     All other components within normal limits   MANUAL DIFFERENTIAL - Abnormal; Notable for the following components:    Neutrophil Absolute Manual 12.91 (*)     Lymphocyte Absolute Manual 8.13 (*)     Metamyelocyte Absolute Manual 1.43 (*)     Myelocyte Absolute Manual 0.48 (*)     NRBC 1 (*)     All other components within normal limits   LIPID PANEL - Abnormal; Notable for the following components:    HDL Cholesterol 34 (*)     Triglycerides 277 (*)     VLDL 45 (*)     Non HDL Chol 137 (*)     All other components within normal limits   PTT, ACTIVATED - Normal   LACTIC ACID REFLEX POST POSTIVE   PATH COMMENT CBC   RAINBOW DRAW LAVENDER   RAINBOW DRAW LIGHT GREEN   RAINBOW DRAW BLUE   RAINBOW DRAW GOLD   BLOOD CULTURE   BLOOD CULTURE   BLOOD CULTURE   BLOOD CULTURE   SPUTUM CULTURE     EKG    Rate, intervals and axes as noted on EKG Report.  Rate: 95  Rhythm: Atrial Fibrillation  Reading: Right bundle branch block, left posterior fascicular block           CTA CHEST (CPT=71275)  Result Date: 6/13/2025  CONCLUSION:   1. No evidence of pulmonary embolus.  2. No suspicious nodule, mass or consolidation.  Dependent reticular nodular densities in the lungs are noted.  3. The patient is intubated.  Enteric catheter traverses the esophagus into the stomach.  No pneumothorax.    LOCATION:  [facility]   Dictated by (CST): Nilesh Eagle MD on 6/13/2025 at 6:44 PM     Finalized by (CST): Nilesh Eagle MD on 6/13/2025 at 6:47 PM       CT BRAIN OR HEAD (CPT=70450)  Result Date: 6/13/2025  PROCEDURE:  CT BRAIN OR HEAD (96827)  COMPARISON:  None.  INDICATIONS:  Full Arrest  TECHNIQUE:  Noncontrast CT scanning is performed through the brain. Dose reduction techniques were used. Dose information is transmitted to the ACR  (American College of Radiology) NRDR (National Radiology Data Registry) which includes the Dose Index Registry.  PATIENT STATED HISTORY: (As transcribed by Technologist)  Patient arrives after arresting during procedure, reaction to contrast dye.    FINDINGS: No evidence of hemorrhage or extra-axial fluid collection.  Supra and infratentorial brain parenchyma are unremarkable.  Ventricles and sulci are appropriate for the patient's age.  No mass effect.  Visualized portions of paranasal sinuses are unremarkable.  Visualized portions of mastoid air cells are unremarkable.  Visualized portions of orbits are unremarkable.  IMPRESSION: Unremarkable CT head.    LOCATION:  [facility]   Dictated by (CST): Nilesh Eagle MD on 6/13/2025 at 6:42 PM     Finalized by (CST): Nilesh Eagle MD on 6/13/2025 at 6:43 PM       XR CHEST AP PORTABLE  (CPT=71045)  Result Date: 6/13/2025  CONCLUSION:   Markedly enlarged cardiac silhouette is noted.  This may be due to the heart itself.  Possibility of pericardial effusion cannot be excluded.  Pulmonary vascular congestion is noted.  No focal consolidation.  A large pneumothorax is not identified.   LOCATION:  [facility]      Dictated by (CST): Nilesh Eagle MD on 6/13/2025 at 6:13 PM     Finalized by (CST): Nilesh Eagle MD on 6/13/2025 at 6:16 PM         The patient was intubated via orotracheal route using a 8.0 mm endotracheal tube.  Rapid sequence induction medications used:   Positioning was confirmed using auscultation and CO2 detector.  Post intubation chest xray was ordered.          MDM         Medical Decision Making  The differential includes the following  Anaphylaxis, hypoxic respiratory failure leading to cardiac arrest, PE, sepsis though less likely, metabolic derangement    Pertinent comorbidities include  Atrial fibrillation on xarelto    Pertinent social history includes      Labs  ABG 7.1/86/224 on initial vent settings with RR of 20, 100% FiO2 and peep of  10  Lactic 7.1   White blood cell count 23.9, hemoglobin 15.6 platelets 245  Potassium 2.9, creatinine 1.61, CO2 26    Imaging studies  I reviewed the images and my independent interpreation after review is cardiomegaly and findings concerning for potential edema on portable cxr.     External data reviewed    Discussion of management with external providers    ER course  On arrival patient reportedly had ROSC however appeared cyanotic.  His staff were preparing to move him to the bed pulses were rechecked and patient found to be pulseless.  CPR initiated and 6 dose of cardiac epi administered through IO as peripheral IVs are being inserted.  Patient also given calcium gluconate.  Blood sugar was found to be 193. Pulse check with PEA.  Patient given 7 the dose of epi.  I gel removed and 8 oh ET tube inserted cardiac epi given.  This was done simultaneously during our second pulse check which revealed ROSC.  EKG shows A-fib without ST elevation. Cardiology at bedside.  Patient started on epi drip due to concerns for potential anaphylaxis being the inciting factor.  Cardiology recommended discussing case with MICU and obtaining CT head CT chest with angio to rule out PE.  Bedside echo however did not show dilated RV.    MICU consulted, Dr. Robertson who saw the patient here in the emergency room down at bedside.    Recommendations made to provide antibiotics.  Blood cultures were already obtained.  Patient's lactic elevated but  likely secondary to cardiac arrest and lack of organ perfusion as opposed to sepsis.  Patient received a total of 1.5 L of normal saline.  After I reviewed his portable chest x-ray I did not provide additional fluids.       Patient requiring additional Levophed for pressor support in addition to epinephrine drip.  CT head without evidence of intracranial hemorrhage or other acute pathology.  CT a of chest without PE.  There are reticular opacities in the bases.  ET tube and OG tube appear in  appropriate position.    I did speak with the patient's wife who noted that he had been in his usual state of health though she notes he had a cough several months ago and has been dealing with some lower extremity swelling after his ablation back in April.  Patient's daughter here at bedside as well and she is a nurse.    Patient ultimately admitted to the ICU for further management.    A total of 120 minutes of critical care time (exclusive of billable procedures) was administered to manage the patient's critical lab values, unstable vital signs, respiratory instability, cardiovascular instability, neurologic instability, and metabolic instability due to his cardiac arrest.  This involved direct patient intervention, complex decision making, and/or extensive discussions with the patient, family, and clinical staff.    Patient did not receive 30ml/kg of fluids despite having: elevated Lactate. The reason for doing so is: a concern for fluid overload. 1500mL of fluids were given instead   Disposition and Plan     Clinical Impression:  1. Cardiac arrest (HCC)    2. Acute respiratory failure with hypoxia (HCC)         Disposition:  Admit  6/13/2025  7:56 pm    Follow-up:  No follow-up provider specified.        Medications Prescribed:  Current Discharge Medication List                Supplementary Documentation:         Hospital Problems       Present on Admission           ICD-10-CM Noted POA    * (Principal) Cardiac arrest ([facility]) I46.9 6/13/2025 Unknown    ROSEMARY (acute kidney injury) N17.9 6/13/2025 Unknown    Atrial fibrillation ([facility]) I48.91 6/13/2025 Unknown    Morbid obesity ([facility]) E66.01 6/13/2025 Unknown    NSTEMI (non-ST elevated myocardial infarction) ([facility]) I21.4 6/13/2025 Unknown

## 2025-06-13 NOTE — RESPIRATORY THERAPY NOTE
RT got called for full arrest coming , RT assisted with intubation and placed on vent . ETT 8.0 @24, co2 detector turn gold .ACVC RR 20, , PEEP 10 , 100%. ABG ordered . Monitor the pt and wean as tolerated .

## 2025-06-13 NOTE — CONSULTS
Josep Frost Patient Status:  Emergency    1965 MRN [id number]   Formerly [facility] EMERGENCY DEPARTMENT Attending Lisset Westbrook MD   Hosp Day # 0 PCP NEELIMA JEFFERSON     Reason for Consultation:  Cardiopulmonary Arrest    History of Present Illness:  Josep Frost is a a(n) 60 year old male who presents to our office for an echocardiogram.  Patient received IV contrast for the echocardiogram and soon after became short of breath, hypoxic and cyanotic.  He lost consciousness and then lost his pulse. CPR was performed in the outpatient setting.  EMS arrived and ROSC was achieved.  He was transferred to the emergency department where further CPR was performed after loss of pulse.  We then received ROSC once again.  Currently intubated and on the ventilator.    History:  Past Medical History[1]  Past Surgical History[2]  Family History[3]   reports that he has never smoked. He has never used smokeless tobacco. He reports that he does not currently use alcohol. He reports that he does not use drugs.    Allergies:  Allergies[4]    Medications:  Current Hospital Medications[5]    Review of Systems:  A comprehensive review of systems was negative if not otherwise mention in above HPI.    BP (!) 127/99   Pulse 86   Temp 96.7 °F (35.9 °C) (Temporal)   Resp 23   SpO2 100%   Temp (24hrs), Av.6 °F (35.9 °C), Min:96.4 °F (35.8 °C), Max:96.7 °F (35.9 °C)     No intake or output data in the 24 hours ending 25 1759  Wt Readings from Last 3 Encounters:   25 (!) 380 lb (172.4 kg)   25 (!) 380 lb (172.4 kg)       Physical Exam:   General: No apparent distress. No respiratory or constitutional distress.  HEENT: Normocephalic, anicteric sclera, neck supple.  Neck: No JVD, carotids 2+, no bruits.  Cardiac: Regular rate and rhythm. S1, S2 normal. No murmur, pericardial rub, S3.  Lungs: Clear without wheezes, rales, rhonchi or dullness.  Normal excursions and effort.  Abdomen: Soft,  non-tender. BS-present.  Extremities: Without clubbing, cyanosis or edema.  Peripheral pulses are 2+.  Neurologic: intubated, sedated  Skin: Warm and dry.     Laboratory Data:  Lab Results   Component Value Date    WBC 23.9 06/13/2025    HGB 15.6 06/13/2025    HCT 47.3 06/13/2025    .0 06/13/2025    CREATSERUM 1.61 06/13/2025    BUN 18 06/13/2025     06/13/2025    K 2.9 06/13/2025     06/13/2025    CO2 26.0 06/13/2025     06/13/2025    CA 10.0 06/13/2025    ALB 4.1 06/13/2025    ALKPHO 161 06/13/2025    BILT 0.5 06/13/2025    TP 6.4 06/13/2025    AST 41 06/13/2025    ALT 34 06/13/2025    PTT 26.1 06/13/2025    INR 1.41 06/13/2025    PTP 17.4 06/13/2025    LIP 66 06/13/2025         Impression:  CardioPulmonary Arrest possibly due to Anaphylactic Reaction  Atrial Fibrillation  Acute Respiratory Failure  Morbid Obesity    Recommendations:  Patient became hypoxic and went to cardiac arrest following Difinity injection for an echocardiogram.  Received CPR and ACLS protocol.  Has significant downtime.  Currently with ROSC  Continue epi, wean as tolerated  On the ventilator management per MICU  Echocardiogram was repeated shows normal function, normal RV size and function  CT of the thorax pending  Will continue to follow closely    Thank you for allowing me to participate in the care of your patient.    Garry Glover MD  6/13/2025  5:59 PM         [1]   Past Medical History:   Arrhythmia   [2]   Past Surgical History:  Procedure Laterality Date    Ndsc ablation & rcnstj atria lmtd w/o bypass     [3] No family history on file.  [4]   Allergies  Allergen Reactions    Hydrocodone-Acetaminophen SHORTNESS OF BREATH     Vicodin   [5]   Current Facility-Administered Medications:     sodium chloride 0.9 % IV bolus 1,000 mL, 1,000 mL, Intravenous, Once    sodium chloride 0.9 % IV bolus 1,000 mL, 1,000 mL, Intravenous, Once    fentaNYL (Sublimaze) 1,000 mcg in sodium chloride 0.9% 100 mL infusion,   mcg/hr, Intravenous, Continuous    EPINEPHrine (Adrenalin) 5 mg in sodium chloride 0.9% 250 mL infusion, 1-10 mcg/min, Intravenous, Continuous    propofol (Diprivan) 10 mg/mL infusion premix, 5-50 mcg/kg/min, Intravenous, Continuous    rocuronium (Zemuron) 50 mg/5mL injection 50 mg, 50 mg, Intravenous, Once    sodium chloride 0.9 % IV bolus 1,000 mL, 1,000 mL, Intravenous, Once    sodium chloride 0.9 % IV bolus 1,000 mL, 1,000 mL, Intravenous, Once    ampicillin-sulbactam (Unasyn) 3 g in sodium chloride 0.9% 100mL IVPB-EUGENE, 3 g, Intravenous, Once    norepinephrine (Levophed) 4 mg/250mL infusion premix, 0.5-50 mcg/min, Intravenous, Continuous

## 2025-06-13 NOTE — ED INITIAL ASSESSMENT (HPI)
Patient arrived via EMS from [facility] after arresting during a procedure and having a reaction to the contrast dye. Patient had no pulse noted upon arrival. 4 rounds of epi given prior to arrival.

## 2025-06-13 NOTE — CONSULTS
CRITICAL CARE CONSULT NOTE:     Patient Name: Josep Frost  : 1965  MRN: [id number]  Admit Date: 2025  Length of Stay: 0 Days    HPI:  Josep Frost is a 60 year old male with a past medical history of atrial fibrillation status post ablation who is reportedly at outpatient cardiology testing where patient was up to his usual state of health and then had Definity testing with echo.  Shortly after infusion patient became cyanotic and reportedly had cardiac arrest.  Reportedly initial rhythm was PEA.  4 rounds of CPR were done as an outpatient and then again lost spontaneous circulation in the ER had 2 more rounds of CPR.  Patient had an i-gel done in the field and then was replaced in the ER with endotracheal intubation at time of dictation chest x-ray still pending  I saw the patient at bedside with the [facility] cardiologist and at this point we look at the bedside echo together appears to have preserved ejection fraction and normal RV function official echo read still pending.  EKG showed no signs of STEMI.  Given patient's body habitus respiratory acidosis and likely not intervenable cardiac reason for arrest will bring the patient to the intensive care unit with cardiology closely following.  Patient at time of dictation was on 10 mcg of norepinephrine.        ROS as above    Past Medical, Surgical, Family and Social History  Patient has a past medical history of Arrhythmia.    He has no past medical history of Anesthesia complication or History of adverse reaction to anesthesia.    Patient has a past surgical history that includes ndsc ablation & rcnstj atria lmtd w/o bypass.    Patient family history is not on file.    Patient reports that he has never smoked. He has never used smokeless tobacco. He reports that he does not currently use alcohol. He reports that he does not use drugs.    Allergies  Patient is allergic to hydrocodone-acetaminophen.          Hemodynamics  24h Vitals:  VitalLast  Value (24 Hour)24 Hour Range  Temp96.7 °F (35.9 °C)Temp  Min: 96.4 °F (35.8 °C)  Max: 96.7 °F (35.9 °C)  [id number]  Min: 38  Max: 141  BP (Non-Invasive)(!) 127/99 BP  Min: 107/81  Max: 184/145  BP (A-Line) No data recorded  CVP  could not be evaluated. This SmartLink does not work with rows of the type:   [id number]  Min: 18  Max: 37  [id number] %SpO2  Min: 76 %  Max: 100 %  O2 Therapy       Assessment and Plan:  Josep Frost is a 60 year old male admitted to ICU for postcardiac arrest      Neuro/Psych: Likely anoxic encephalopathy with some evidence of posturing patient will need targeted temperature management goal 36  Sedation with propofol and fentanyl    Cardiovascular: Cardiac arrest likely at this point secondary to Definity contrast agent well-described in the literature?? Type ! Hypersensitivity rxn?? Start steroids for 1 day  Serial troponins  Official echo but no obvious pericardial effusion despite chest x-ray read  Cardiology following  Low suspicion for electrolyte abnormalities hypoglycemia acidosis etc. as the patient temporally had contrast and then had arrest.  CTA of the chest pending  Maintain goals with norepinephrine mean arterial pressure 65  Modified early goal-directed therapy will need central line arterial line in the intensive care unit continue epinephrine    History of atrial fibrillation currently appears to be in atrial fibrillation status post ablation rate controlled      Pulmonary: Respiratory failure intubated during arrest patient with initial respiratory acidosis given body habitus will need reverse Trendelenburg positioning  Nebs as needed Q4 no history of bronchospastic disease continue to wean ventilator    GI: Ischemic hepatopathy would anticipate in the near future  Will need GI prophylaxis    Renal/Metabolic: Acute kidney injury at least based on recent blood work as outpatient    Heme: No significant anemia or thrombocytopenia continue DVT prophylaxis if no head  bleed    Infectious Disease: Leukocytosis likely acute phase reactant we will do Unasyn   Check sputum culture      Endocrine: No known history of endocrinopathy      ICU checklist  Feeding: None  Analgesia: Fentanyl  Sedation:   Thromboembolism PPX: Heparin subcu  Stress Ulcer PPX: PPI  Glucose control:   Bowel Regimen:   Lines/drains/tubes:   Deescalation of antibiotics:   Therapies: PT/OT/ST when appropriate    Code Status: No Order    Upon my evaluation, this patient had a high probability of imminent or life-threatening deterioration due to shock and respiratory failure, which required my direct attention, intervention, and personal management.    I have personally provided 45 minutes of critical care time, exclusive of time spent on separately billable procedures.  Time includes review of all pertinent laboratory/radiology results, discussion with consultants, and monitoring for potential decompensation.  Performed interventions included fluids, pressor drugs, and managing mechanical ventilation.

## 2025-06-14 LAB
ALBUMIN SERPL-MCNC: 4.1 G/DL (ref 3.2–4.8)
ALBUMIN/GLOB SERPL: 1.8 {RATIO} (ref 1–2)
ALP LIVER SERPL-CCNC: 105 U/L (ref 45–117)
ALT SERPL-CCNC: 44 U/L (ref 10–49)
ANION GAP SERPL CALC-SCNC: 11 MMOL/L (ref 0–18)
ANION GAP SERPL CALC-SCNC: 17 MMOL/L (ref 0–18)
APTT PPP: 24 SECONDS (ref 23–36)
AST SERPL-CCNC: 73 U/L (ref ?–34)
BASE EXCESS BLDA CALC-SCNC: 0.2 MMOL/L (ref ?–2)
BASE EXCESS BLDA CALC-SCNC: 1.7 MMOL/L (ref ?–2)
BASE EXCESS BLDV CALC-SCNC: -0.7 MMOL/L
BASOPHILS # BLD: 0 X10(3) UL (ref 0–0.2)
BASOPHILS NFR BLD: 0 %
BILIRUB DIRECT SERPL-MCNC: 0.2 MG/DL (ref ?–0.3)
BILIRUB SERPL-MCNC: 0.5 MG/DL (ref 0.2–1.1)
BODY TEMPERATURE: 98.6 F
BODY TEMPERATURE: 98.6 F
BUN BLD-MCNC: 26 MG/DL (ref 9–23)
BUN BLD-MCNC: 30 MG/DL (ref 9–23)
CA-I BLD-SCNC: 1.04 MMOL/L (ref 0.95–1.32)
CA-I BLD-SCNC: 1.1 MMOL/L (ref 0.95–1.32)
CA-I BLD-SCNC: 1.11 MMOL/L (ref 0.95–1.32)
CALCIUM BLD-MCNC: 8.4 MG/DL (ref 8.7–10.6)
CALCIUM BLD-MCNC: 8.5 MG/DL (ref 8.7–10.6)
CHLORIDE SERPL-SCNC: 102 MMOL/L (ref 98–112)
CHLORIDE SERPL-SCNC: 99 MMOL/L (ref 98–112)
CO2 SERPL-SCNC: 20 MMOL/L (ref 21–32)
CO2 SERPL-SCNC: 26 MMOL/L (ref 21–32)
COHGB MFR BLD: 1.5 % SAT (ref 0–3)
COHGB MFR BLD: 1.7 % SAT (ref 0–3)
CREAT BLD-MCNC: 1.84 MG/DL (ref 0.7–1.3)
CREAT BLD-MCNC: 2.18 MG/DL (ref 0.7–1.3)
EGFRCR SERPLBLD CKD-EPI 2021: 34 ML/MIN/1.73M2 (ref 60–?)
EGFRCR SERPLBLD CKD-EPI 2021: 41 ML/MIN/1.73M2 (ref 60–?)
EOSINOPHIL # BLD: 0 X10(3) UL (ref 0–0.7)
EOSINOPHIL NFR BLD: 0 %
ERYTHROCYTE [DISTWIDTH] IN BLOOD BY AUTOMATED COUNT: 13.2 %
EST. AVERAGE GLUCOSE BLD GHB EST-MCNC: 126 MG/DL (ref 68–126)
FIO2: 100 %
FIO2: 50 %
FIO2: 80 %
GLOBULIN PLAS-MCNC: 2.3 G/DL (ref 2–3.5)
GLUCOSE BLD-MCNC: 184 MG/DL (ref 70–99)
GLUCOSE BLD-MCNC: 189 MG/DL (ref 70–99)
GLUCOSE BLD-MCNC: 204 MG/DL (ref 70–99)
GLUCOSE BLD-MCNC: 211 MG/DL (ref 70–99)
GLUCOSE BLD-MCNC: 264 MG/DL (ref 70–99)
GLUCOSE BLD-MCNC: 311 MG/DL (ref 70–99)
HBA1C MFR BLD: 6 % (ref ?–5.7)
HCO3 BLDA-SCNC: 25.1 MEQ/L (ref 21–27)
HCO3 BLDA-SCNC: 26.2 MEQ/L (ref 21–27)
HCO3 BLDV-SCNC: 23.9 MEQ/L (ref 22–26)
HCT VFR BLD AUTO: 45.6 % (ref 39–53)
HGB BLD-MCNC: 14.6 G/DL (ref 13–17.5)
HGB BLD-MCNC: 15.1 G/DL (ref 13–17.5)
HGB BLD-MCNC: 15.6 G/DL (ref 13–17.5)
INR BLD: 1.23 (ref 0.8–1.2)
LACTATE BLD-SCNC: 2.2 MMOL/L (ref 0.5–2)
LACTATE BLD-SCNC: 2.7 MMOL/L (ref 0.5–2)
LYMPHOCYTES NFR BLD: 0.99 X10(3) UL (ref 1–4)
LYMPHOCYTES NFR BLD: 3 %
MAGNESIUM SERPL-MCNC: 1.7 MG/DL (ref 1.6–2.6)
MAGNESIUM SERPL-MCNC: 1.9 MG/DL (ref 1.6–2.6)
MCH RBC QN AUTO: 30.8 PG (ref 26–34)
MCHC RBC AUTO-ENTMCNC: 34.2 G/DL (ref 31–37)
MCV RBC AUTO: 89.9 FL (ref 80–100)
METHGB MFR BLD: 0.8 % SAT (ref 0.4–1.5)
METHGB MFR BLD: 1.1 % SAT (ref 0.4–1.5)
MONOCYTES # BLD: 0.99 X10(3) UL (ref 0.1–1)
MONOCYTES NFR BLD: 3 %
MORPHOLOGY: NORMAL
NEUTROPHILS # BLD AUTO: 29.48 X10 (3) UL (ref 1.5–7.7)
NEUTROPHILS NFR BLD: 83 %
NEUTS BAND NFR BLD: 11 %
NEUTS HYPERSEG # BLD: 30.93 X10(3) UL (ref 1.5–7.7)
OSMOLALITY SERPL CALC.SUM OF ELEC: 294 MOSM/KG (ref 275–295)
OSMOLALITY SERPL CALC.SUM OF ELEC: 305 MOSM/KG (ref 275–295)
OXYHGB MFR BLDA: 97.3 % (ref 92–100)
OXYHGB MFR BLDA: 97.4 % (ref 92–100)
OXYHGB MFR BLDV: 78.4 % (ref 72–78)
P/F RATIO: 244 MMHG
PCO2 BLDA: 42 MM HG (ref 35–45)
PCO2 BLDA: 42 MM HG (ref 35–45)
PCO2 BLDV: 56 MM HG (ref 38–50)
PEEP: 13 CM H2O
PEEP: 20 CM H2O
PEEP: 20 CM H2O
PH BLDA: 7.39 [PH] (ref 7.35–7.45)
PH BLDA: 7.41 [PH] (ref 7.35–7.45)
PH BLDV: 7.29 [PH] (ref 7.33–7.43)
PHOSPHATE SERPL-MCNC: 3.9 MG/DL (ref 2.4–5.1)
PLATELET # BLD AUTO: 349 10(3)UL (ref 150–450)
PLATELET MORPHOLOGY: NORMAL
PO2 BLDA: 129 MM HG (ref 80–100)
PO2 BLDA: 195 MM HG (ref 80–100)
PO2 BLDV: 48 MM HG (ref 30–50)
POTASSIUM BLD-SCNC: 4.6 MMOL/L (ref 3.6–5.1)
POTASSIUM BLD-SCNC: 5 MMOL/L (ref 3.6–5.1)
POTASSIUM BLD-SCNC: 5.2 MMOL/L (ref 3.6–5.1)
POTASSIUM SERPL-SCNC: 4 MMOL/L (ref 3.5–5.1)
POTASSIUM SERPL-SCNC: 4.7 MMOL/L (ref 3.5–5.1)
PROT SERPL-MCNC: 6.4 G/DL (ref 5.7–8.2)
PROTHROMBIN TIME: 15.6 SECONDS (ref 11.6–14.8)
RBC # BLD AUTO: 5.07 X10(6)UL (ref 4.3–5.7)
SODIUM BLD-SCNC: 128 MMOL/L (ref 135–145)
SODIUM BLD-SCNC: 130 MMOL/L (ref 135–145)
SODIUM BLD-SCNC: 131 MMOL/L (ref 135–145)
SODIUM SERPL-SCNC: 136 MMOL/L (ref 136–145)
SODIUM SERPL-SCNC: 139 MMOL/L (ref 136–145)
TIDAL VOLUME: 550 ML
TOTAL CELLS COUNTED BLD: 100
TROPONIN I SERPL HS-MCNC: 786 NG/L (ref ?–53)
TSI SER-ACNC: 1.84 UIU/ML (ref 0.55–4.78)
VENT RATE: 24 /MIN
WBC # BLD AUTO: 32.9 X10(3) UL (ref 4–11)

## 2025-06-14 PROCEDURE — 99291 CRITICAL CARE FIRST HOUR: CPT | Performed by: EMERGENCY MEDICINE

## 2025-06-14 PROCEDURE — 99233 SBSQ HOSP IP/OBS HIGH 50: CPT | Performed by: HOSPITALIST

## 2025-06-14 PROCEDURE — 99223 1ST HOSP IP/OBS HIGH 75: CPT | Performed by: INTERNAL MEDICINE

## 2025-06-14 PROCEDURE — 99223 1ST HOSP IP/OBS HIGH 75: CPT | Performed by: OTHER

## 2025-06-14 RX ORDER — MAGNESIUM SULFATE HEPTAHYDRATE 40 MG/ML
2 INJECTION, SOLUTION INTRAVENOUS ONCE
Status: COMPLETED | OUTPATIENT
Start: 2025-06-14 | End: 2025-06-14

## 2025-06-14 RX ORDER — HEPARIN SODIUM 5000 [USP'U]/ML
7500 INJECTION, SOLUTION INTRAVENOUS; SUBCUTANEOUS EVERY 8 HOURS SCHEDULED
Status: DISCONTINUED | OUTPATIENT
Start: 2025-06-14 | End: 2025-06-15

## 2025-06-14 RX ORDER — NICOTINE POLACRILEX 4 MG
15 LOZENGE BUCCAL
Status: DISCONTINUED | OUTPATIENT
Start: 2025-06-14 | End: 2025-06-22

## 2025-06-14 RX ORDER — NICOTINE POLACRILEX 4 MG
30 LOZENGE BUCCAL
Status: DISCONTINUED | OUTPATIENT
Start: 2025-06-14 | End: 2025-06-22

## 2025-06-14 RX ORDER — DEXTROSE MONOHYDRATE 25 G/50ML
50 INJECTION, SOLUTION INTRAVENOUS
Status: DISCONTINUED | OUTPATIENT
Start: 2025-06-14 | End: 2025-06-22

## 2025-06-14 RX ORDER — HEPARIN SODIUM 5000 [USP'U]/ML
5000 INJECTION, SOLUTION INTRAVENOUS; SUBCUTANEOUS EVERY 8 HOURS SCHEDULED
Status: DISCONTINUED | OUTPATIENT
Start: 2025-06-14 | End: 2025-06-14

## 2025-06-14 RX ORDER — HYDROCORTISONE SODIUM SUCCINATE 100 MG/2ML
50 INJECTION INTRAMUSCULAR; INTRAVENOUS EVERY 6 HOURS SCHEDULED
Status: DISCONTINUED | OUTPATIENT
Start: 2025-06-14 | End: 2025-06-16

## 2025-06-14 NOTE — PROGRESS NOTES
CRITICAL CARE PROGRESS NOTE    Patient Name: Josep Frost  : 1965  MRN: [id number]  Admit Date: 2025  Length of Stay: 1 Days    Subjective/24h events: Patient this morning still very hypoxic requiring PEEP as well as FiO2 near 100%  On norepinephrine 20 mcg plus will add vasopressin still undergoing targeted temperature management    Assessment and Plan: 60-year-old male status post cardiac arrest possible anaphylactic reaction    Neuro/Psych: Anoxic encephalopathy targeted temperature management will and around 7 PM tonight and then slowly reduce sedation  EEG pending  Spoke to neurology they are following CT head reviewed initially no gray-white matter differentiation irregularity    Cardiovascular: Cardiac arrest presumably secondary to anaphylactic reaction in the setting of likely pickwickian/EL causing hypoxia and then cardiac arrest prolonged CPR time 20 minutes  Current shock multifactorial echo appears to be fairly preserved without pericardial effusion  Check CVP lactate downtrending check venous sat may help with respect to giving more clarity with shock  Cardiology following  Troponin elevation downtrending    Pulmonary: Hypoxemic respiratory failure patient's degree of hypoxia is significantly out of proportion to his CT scan findings.  There could be a latent aspiration that is burgeoning  Endorse-positive end expiratory pressure would not let a plateau of 30 given his body habitus mitigate PEEP strategy  May require echo with bubble    GI: No ischemic hepatopathy that is pertinent  Continue GI prophylaxis  No feeding given degree of vasopressors    Renal/Metabolic: Acute kidney injury likely secondary to ATN hopefully will plateau    Heme: No significant anemia thrombocytopenia or coagulopathy    ID: Leukocytosis significant although patient on hydrocortisone please see below  Continue Unasyn for 48 hours await culture data    Endocrine: No known history of endocrinopathy  Sugars  elevated will up sliding scale likely secondary to steroids    Steroids were only started anecdotally given the possibility this was an anaphylactic reaction we will check venous gas as above      ICU checklist  Feeding: Not today  Analgesia: Propofol  Sedation: Fentanyl  Thromboembolism PPX: Heparin subcu  Stress Ulcer PPX: PPI  Glucose control: Sliding scale  Bowel Regimen:   Lines/drains/tubes:   Deescalation of antibiotics: Unasyn  Therapies:PT/OT/ST when appropriate    Full code    Upon my evaluation, this patient had a high probability of imminent or life-threatening deterioration due to circulatory failure and respiratory failure, which required my direct attention, intervention, and personal management.    I have personally provided 55 minutes of critical care time, exclusive of time spent on separately billable procedures.  Time includes review of all pertinent laboratory/radiology results, discussion with consultants, and monitoring for potential decompensation.  Performed interventions included pressor drugs and managing mechanical ventilation.            Physical Exam  General: No acute distress  Neuro: AO x3, non focal   Lungs: Clear  Cardio:  RRR  Abdomen: Soft, non tender, non distended  Extremities: Warm, no swelling     Hemodynamics  24h Vitals:  VitalLast Value (24 Hour)24 Hour Range  Oejb733.4 °F (38 °C)Temp  Min: 96.4 °F (35.8 °C)  Max: 100.4 °F (38 °C)  [id number]  Min: 38  Max: 141  BP (Non-Invasive)104/59 BP  Min: 73/31  Max: 184/145  BP (A-Line)91/66AO  Min: 76/68  Max: 117/72  CVP  could not be evaluated. This SmartLink does not work with rows of the type:   [id number]  Min: 13  Max: 43  [id number] %SpO2  Min: 76 %  Max: 100 %  O2 Therapy

## 2025-06-14 NOTE — CONSULTS
[facility]  Pulmonary/Critical Care Consult note    Josep Frost Patient Status:  Inpatient    1965 MRN [id number]   Location [facility] 4SW-A Attending Jumana Doe MD   Hosp Day # 1 PCP NEELIMA JEFFERSON     Reason for Consultation:  Acute hypoxic respiratory failure    History of Present Illness:  Josep Frost is a a(n) 60 year old male lifelong non-smoker with history of atrial fibrillation on anticoagulation, who presented to the emergency department on 2025 with a PEA arrest CPR was initiated..  Patient was intubated and mechanically ventilated.  Reportedly patient had 2 rounds of epinephrine before ROSC was obtained patient's family reports that CPR was performed twice for about 10 minutes.  Pulmonary was consulted for ventilator management.    The patient has been hypotensive and required vasopressors        History:  Past Medical History[1]  Past Surgical History[2]  Family History[3]   reports that he has never smoked. He has never used smokeless tobacco. He reports that he does not currently use alcohol. He reports that he does not use drugs.    Allergies:  Allergies[4]    Medications:  Current Hospital Medications[5]    Intake/Output:    Intake/Output Summary (Last 24 hours) at 2025 1223  Last data filed at 2025 0716  Gross per 24 hour   Intake 5021.8 ml   Output 1300 ml   Net 3721.8 ml      Body mass index is 50.43 kg/m².    Review of Systems  Review of Systems:   A comprehensive 10 point review of systems was completed.  Pertinent positives and negatives noted in the the HPI.         Patient Vitals for the past 24 hrs:   BP Temp Temp src Pulse Resp SpO2 Height Weight   25 1116 -- (!) 95.5 °F (35.3 °C) Rectal 77 22 -- -- --   25 1000 103/61 -- -- 82 24 -- -- --   25 0926 -- -- -- 80 24 97 % -- --   25 0900 104/59 100.4 °F (38 °C) -- 87 24 -- -- --   25 0813 115/46 100.2 °F (37.9 °C) -- 84 23 97 % 6' 3\" (1.905 m) --   25 0800  115/46 100 °F (37.8 °C) Bladder 83 24 94 % -- --   06/14/25 0757 -- -- -- -- -- (!) 89 % -- --   06/14/25 0728 -- -- -- 83 24 95 % -- --   06/14/25 0715 108/59 100 °F (37.8 °C) -- 84 24 95 % -- --   06/14/25 0700 115/65 100 °F (37.8 °C) -- 83 24 94 % -- --   06/14/25 0645 118/59 100 °F (37.8 °C) -- 82 24 94 % -- --   06/14/25 0630 104/77 100 °F (37.8 °C) -- 83 24 94 % -- --   06/14/25 0615 114/50 99.9 °F (37.7 °C) -- 83 24 97 % -- --   06/14/25 0600 123/68 99.9 °F (37.7 °C) -- 83 24 96 % -- --   06/14/25 0545 116/71 99.9 °F (37.7 °C) -- 85 24 96 % -- --   06/14/25 0530 120/63 99.9 °F (37.7 °C) -- 86 24 95 % -- --   06/14/25 0515 119/66 99.9 °F (37.7 °C) -- 86 23 96 % -- --   06/14/25 0500 112/63 99.9 °F (37.7 °C) Bladder 85 24 95 % -- --   06/14/25 0445 121/63 99.7 °F (37.6 °C) -- 85 21 97 % -- --   06/14/25 0430 116/57 99.7 °F (37.6 °C) -- 84 24 98 % -- --   06/14/25 0415 124/77 99.5 °F (37.5 °C) -- 85 25 100 % -- --   06/14/25 0400 123/71 99.5 °F (37.5 °C) Bladder 84 24 100 % -- --   06/14/25 0345 124/56 99.3 °F (37.4 °C) -- 83 24 98 % -- --   06/14/25 0330 117/67 99.3 °F (37.4 °C) -- 84 24 (!) 88 % -- --   06/14/25 0315 115/66 99.5 °F (37.5 °C) -- 83 24 95 % -- --   06/14/25 0300 112/58 99.5 °F (37.5 °C) Bladder 83 24 94 % -- --   06/14/25 0245 120/55 99.5 °F (37.5 °C) -- 83 24 94 % -- --   06/14/25 0230 110/53 99.5 °F (37.5 °C) -- 82 24 95 % -- --   06/14/25 0215 110/58 99.7 °F (37.6 °C) -- 83 24 93 % -- --   06/14/25 0200 106/56 99.7 °F (37.6 °C) Bladder 84 24 94 % -- --   06/14/25 0145 111/66 99.5 °F (37.5 °C) -- 85 24 95 % -- --   06/14/25 0130 109/60 99.5 °F (37.5 °C) -- 84 24 95 % -- --   06/14/25 0115 102/67 99.5 °F (37.5 °C) -- 86 24 96 % -- --   06/14/25 0100 (!) 85/63 99.5 °F (37.5 °C) -- 87 24 95 % -- --   06/14/25 0045 104/58 99.5 °F (37.5 °C) -- 87 24 95 % -- --   06/14/25 0030 109/62 99.3 °F (37.4 °C) -- 86 24 95 % -- --   06/14/25 0015 101/58 99.3 °F (37.4 °C) -- 86 24 95 % -- --   06/14/25 0000  101/62 99.3 °F (37.4 °C) Bladder 87 24 95 % -- --   06/13/25 2345 102/52 99.3 °F (37.4 °C) -- 87 24 95 % -- --   06/13/25 2330 102/63 99.1 °F (37.3 °C) -- 88 24 94 % -- --   06/13/25 2315 98/51 99.1 °F (37.3 °C) -- 87 24 94 % -- --   06/13/25 2300 94/56 99 °F (37.2 °C) Bladder 86 (!) 27 94 % -- --   06/13/25 2245 99/58 98.4 °F (36.9 °C) -- 87 (!) 32 92 % -- --   06/13/25 2230 (!) 83/56 98.2 °F (36.8 °C) -- 93 25 95 % -- --   06/13/25 2215 115/64 -- -- 90 25 92 % -- --   06/13/25 2200 104/53 -- -- 88 (!) 27 90 % -- --   06/13/25 2145 113/43 -- -- 99 (!) 30 91 % -- --   06/13/25 2130 113/78 -- -- 87 13 93 % -- --   06/13/25 2115 107/61 -- -- 86 (!) 43 94 % -- --   06/13/25 2111 -- -- -- 87 23 94 % -- --   06/13/25 2100 (!) 89/54 -- -- 100 (!) 32 93 % -- --   06/13/25 2045 91/70 98.7 °F (37.1 °C) Temporal 97 19 96 % -- (!) 403 lb 7.1 oz (183 kg)   06/13/25 2030 (!) 119/93 -- -- 87 (!) 32 100 % -- --   06/13/25 2000 117/56 -- -- 88 24 100 % -- --   06/13/25 1945 (!) 131/101 -- -- 92 24 100 % -- --   06/13/25 1930 126/69 -- -- 93 24 99 % -- --   06/13/25 1920 (!) 125/106 -- -- 95 24 99 % -- --   06/13/25 1916 (!) 131/115 -- -- 96 24 99 % -- --   06/13/25 1908 (!) 164/144 -- -- 91 24 100 % -- --   06/13/25 1903 (!) 142/123 -- -- (!) 48 24 99 % -- --   06/13/25 1900 153/79 -- -- 95 24 97 % -- --   06/13/25 1853 134/60 -- -- 87 24 100 % -- --   06/13/25 1848 131/84 -- -- 94 24 100 % -- --   06/13/25 1843 (!) 139/105 -- -- 94 26 98 % -- --   06/13/25 1838 95/84 -- -- 96 25 97 % -- --   06/13/25 1738 (!) 73/31 -- -- 94 24 96 % -- --   06/13/25 1730 (!) 127/99 -- -- 86 23 100 % -- --   06/13/25 1718 -- 96.7 °F (35.9 °C) Temporal -- -- -- -- --   06/13/25 1715 (!) 137/110 -- Temporal (!) 141 18 100 % -- --   06/13/25 1704 -- (!) 96.4 °F (35.8 °C) Temporal -- -- -- -- --   06/13/25 1700 107/81 -- -- 100 18 98 % -- --   06/13/25 1655 119/69 -- -- 101 20 98 % -- --   06/13/25 1650 (!) 184/145 -- -- 105 19 100 % -- --   06/13/25  1645 (!) 125/96 -- -- (!) 123 (!) 37 99 % -- --   06/13/25 1640 -- -- -- (!) 38 (!) 35 (!) 76 % -- --     Vitals:    06/14/25 0900 06/14/25 0926 06/14/25 1000 06/14/25 1116   BP: 104/59  103/61    BP Location:       Pulse: 87 80 82 77   Resp: 24 24 24 22   Temp: 100.4 °F (38 °C)   (!) 95.5 °F (35.3 °C)   TempSrc:    Rectal   SpO2:  97%     Weight:       Height:             Physical Exam  Constitutional:       General: He is not in acute distress.     Appearance: Normal appearance. He is obese. He is not ill-appearing or diaphoretic.   HENT:      Head: Normocephalic and atraumatic.      Nose: Nose normal. No congestion or rhinorrhea.      Comments:       Mouth/Throat:      Mouth: Mucous membranes are moist.      Pharynx: Oropharynx is clear. No oropharyngeal exudate or posterior oropharyngeal erythema.      Comments: Oral endotracheal tube  Eyes:      Extraocular Movements: Extraocular movements intact.      Pupils: Pupils are equal, round, and reactive to light.   Cardiovascular:      Rate and Rhythm: Normal rate.      Pulses: Normal pulses.      Heart sounds: Normal heart sounds. No murmur heard.  Pulmonary:      Effort: Pulmonary effort is normal. No respiratory distress.      Breath sounds: Normal breath sounds. No wheezing or rhonchi.      Comments: Diminished breath sounds bilaterally  Chest:      Chest wall: No tenderness.   Abdominal:      General: Abdomen is flat. Bowel sounds are normal.      Palpations: Abdomen is soft.   Musculoskeletal:         General: Normal range of motion.   Skin:     General: Skin is warm.            Lab Data Review:  Recent Labs   Lab 06/13/25 1656 06/14/25 0419   WBC 23.9* 32.9*   HGB 15.6 15.6   HCT 47.3 45.6   .0 349.0   EOS 0 0       Recent Labs   Lab 06/13/25 1656 06/13/25 2056 06/14/25 0419    143 139   K 2.9* 4.2 4.0    101 102   CO2 26.0 30.0 20.0*   BUN 18 20 26*   CREATSERUM 1.61* 1.83* 2.18*   CA 10.0 8.6* 8.4*   ALB 4.1  --  4.1   ALKPHO 161*  --   105   ALT 34  --  44   AST 41*  --  73*   * 233* 311*       Recent Labs   Lab 06/13/25 2056 06/14/25  0419   MG 2.1 1.7       Lab Results   Component Value Date    PHOS 3.9 06/14/2025        Recent Labs   Lab 06/13/25  1656 06/14/25  0419   INR 1.41* 1.23*   PTT 26.1 24.0       Recent Labs   Lab 06/13/25  1708 06/13/25  2222   ABGPHT 7.10* 7.25*   QVTOXU3M 86* 53*   KSYXJ7X 224* 85   ABGHCO3 20.6* 21.1   ABGBE -5.6* -4.8*   TEMP 98.6 98.6   DUNG Positive Not Applicable   SITE Right Radial Arterial Line   DEV     THGB 15.6 15.9       No results for input(s): \"TROP\", \"CKMB\" in the last 168 hours.    Cultures: No results found for this visit on 06/13/25.        Radiology personally reviewed:  XR CHEST AP PORTABLE  (CPT=71045)  Addendum Date: 6/13/2025  ADDENDUM:  Right internal jugular central line has tip in the expected location the SVC.  No pneumothorax.  Dictated by (CST): Nilesh Eagle MD on 6/13/2025 at 10:57 PM     Finalized by (CST): Nilesh Eagle MD on 6/13/2025 at 10:58 PM             Result Date: 6/13/2025  CONCLUSION:   Endotracheal tube tip is 4.6 cm above the earl.  Enteric catheter is not well delineated on this exam.  Recommend repeat radiographs.   LOCATION:  [facility]      Dictated by (CST): Nilesh Eagle MD on 6/13/2025 at 10:22 PM     Finalized by (CST): Nilesh Eagle MD on 6/13/2025 at 10:22 PM       CTA CHEST (CPT=71275)  Result Date: 6/13/2025  CONCLUSION:   1. No evidence of pulmonary embolus.  2. No suspicious nodule, mass or consolidation.  Dependent reticular nodular densities in the lungs are noted.  3. The patient is intubated.  Enteric catheter traverses the esophagus into the stomach.  No pneumothorax.    LOCATION:  [facility]   Dictated by (CST): Nilesh Eagle MD on 6/13/2025 at 6:44 PM     Finalized by (CST): Nilesh Eagle MD on 6/13/2025 at 6:47 PM       CT BRAIN OR HEAD (CPT=70450)  Result Date: 6/13/2025  PROCEDURE:  CT BRAIN OR HEAD  (08517)  COMPARISON:  None.  INDICATIONS:  Full Arrest  TECHNIQUE:  Noncontrast CT scanning is performed through the brain. Dose reduction techniques were used. Dose information is transmitted to the ACR (American College of Radiology) NRDR (National Radiology Data Registry) which includes the Dose Index Registry.  PATIENT STATED HISTORY: (As transcribed by Technologist)  Patient arrives after arresting during procedure, reaction to contrast dye.    FINDINGS: No evidence of hemorrhage or extra-axial fluid collection.  Supra and infratentorial brain parenchyma are unremarkable.  Ventricles and sulci are appropriate for the patient's age.  No mass effect.  Visualized portions of paranasal sinuses are unremarkable.  Visualized portions of mastoid air cells are unremarkable.  Visualized portions of orbits are unremarkable.  IMPRESSION: Unremarkable CT head.    LOCATION:  [facility]   Dictated by (CST): Nilesh Eagle MD on 6/13/2025 at 6:42 PM     Finalized by (CST): Nilesh Eagle MD on 6/13/2025 at 6:43 PM       XR CHEST AP PORTABLE  (CPT=71045)  Result Date: 6/13/2025  CONCLUSION:   Markedly enlarged cardiac silhouette is noted.  This may be due to the heart itself.  Possibility of pericardial effusion cannot be excluded.  Pulmonary vascular congestion is noted.  No focal consolidation.  A large pneumothorax is not identified.   LOCATION:  [facility]      Dictated by (CST): Nilesh Eagle MD on 6/13/2025 at 6:13 PM     Finalized by (CST): Nilesh Eagle MD on 6/13/2025 at 6:16 PM          Problem List[6]    Assessment:  Acute hypoxic respiratory failure/mechanical ventilation  Atrial fibrillation with RVR: Rate controlled  Hypotension/shock  Status post PEA arrest, at risk for anoxic/hypoxic encephalopathy wean FiO2 to keep oxygen saturation between 90% to 92%  Non-STEMI type II demand ischemia  Severe obesity, class III Body mass index is 50.43 kg/m².likely with obesity hypoventilation syndrome and at risk  for obstructive apnea syndrome  ROSEMARY  Leukocytosis, suspect reactive, but patient at risk for aspiration pneumonia    Plan:  Wean FiO2 to keep oxygen saturation between 90% 92%  Continue mechanical ventilation  Targeted  temperature management protocol  Continue use Unasyn 3 g IV every 6 hours  Hydrocortisone 50 mg IV every 6 hours  DVT prophylaxis: SCD boot   GI prophylaxis: Protonix 40 mg daily    Will follow for further recommendations         Thank You for allowing me to participate in this patient's care     Francisco Johnson MD    Note to the patient: The 21st Century Cures Act makes medical notes like these available to patients in the interest of transparency. However, be advised that this is a medical document. It is intended as peer to peer communication. It is written in medical language and may contain abbreviations or verbiage that are unfamiliar. It may appear blunt or direct. Medical documents are intended to carry relevant information, facts as evident, and clinical opinion of the practitioner.      Disclaimer: Components of this note were documented using voice recognition system and are subject to errors not corrected at proofreading. Contact the author of this note for any clarifications          [1]   Past Medical History:   Arrhythmia   [2]   Past Surgical History:  Procedure Laterality Date    Ndsc ablation & rcnstj atria [facility]td w/o bypass     [3] No family history on file.  [4]   Allergies  Allergen Reactions    Hydrocodone-Acetaminophen SHORTNESS OF BREATH     Vicodin   [5]   Current Facility-Administered Medications:     glucose (Dex4) 15 GM/59ML oral liquid 15 g, 15 g, Oral, Q15 Min PRN **OR** glucose (Glutose) 40% oral gel 15 g, 15 g, Oral, Q15 Min PRN **OR** glucose-vitamin C (Dex-4) chewable tab 4 tablet, 4 tablet, Oral, Q15 Min PRN **OR** dextrose 50% injection 50 mL, 50 mL, Intravenous, Q15 Min PRN **OR** glucose (Dex4) 15 GM/59ML oral liquid 30 g, 30 g, Oral, Q15 Min PRN **OR** glucose  (Glutose) 40% oral gel 30 g, 30 g, Oral, Q15 Min PRN **OR** glucose-vitamin C (Dex-4) chewable tab 8 tablet, 8 tablet, Oral, Q15 Min PRN    vasopressin (Vasostrict) 20 Units in sodium chloride 0.9% 100 mL infusion for septic shock, 0.01-0.03 Units/min, Intravenous, Continuous    norepinephrine (Levophed) 8 mg in dextrose 5% 250 mL infusion, 0.5-50 mcg/min, Intravenous, Continuous    hydrocortisone Na succinate PF (Solu-CORTEF) injection 50 mg, 50 mg, Intravenous, 4 times per day    insulin aspart (NovoLOG) 100 Units/mL FlexPen 2-10 Units, 2-10 Units, Subcutaneous, 4 times per day    fentaNYL (Sublimaze) 1,000 mcg in sodium chloride 0.9% 100 mL infusion,  mcg/hr, Intravenous, Continuous    EPINEPHrine (Adrenalin) 5 mg in sodium chloride 0.9% 250 mL infusion, 1-10 mcg/min, Intravenous, Continuous    propofol (Diprivan) 10 mg/mL infusion premix, 5-50 mcg/kg/min, Intravenous, Continuous    acetaminophen (Tylenol Extra Strength) tab 1,000 mg, 1,000 mg, Oral, Q8H PRN    melatonin tab 3 mg, 3 mg, Oral, Nightly PRN    polyethylene glycol (PEG 3350) (Miralax) 17 g oral packet 17 g, 17 g, Oral, Daily PRN    sennosides (Senokot) tab 17.2 mg, 17.2 mg, Oral, Nightly PRN    bisacodyl (Dulcolax) 10 MG rectal suppository 10 mg, 10 mg, Rectal, Daily PRN    ondansetron (Zofran) 4 MG/2ML injection 4 mg, 4 mg, Intravenous, Q6H PRN    prochlorperazine (Compazine) 10 MG/2ML injection 5 mg, 5 mg, Intravenous, Q8H PRN    benzonatate (Tessalon) cap 200 mg, 200 mg, Oral, TID PRN    guaiFENesin (Robitussin) 100 MG/5 ML oral liquid 200 mg, 200 mg, Oral, Q4H PRN    glycerin-hypromellose- (Artificial Tears) 0.2-0.2-1 % ophthalmic solution 1 drop, 1 drop, Both Eyes, QID PRN    sodium chloride (Saline Mist) 0.65 % nasal solution 1 spray, 1 spray, Each Nare, Q3H PRN    sodium chloride 0.9 % IV bolus 1,000 mL, 1,000 mL, Intravenous, Once    pantoprazole (Protonix) 40 mg in sodium chloride 0.9% PF 10 mL IV push, 40 mg, Intravenous,  Q24H    acetaminophen (Tylenol) 160 MG/5ML oral liquid 650 mg, 650 mg, Oral, 4 times per day **OR** acetaminophen (Tylenol) rectal suppository 650 mg, 650 mg, Rectal, 4 times per day    busPIRone (Buspar) tab 30 mg, 30 mg, Oral, Q8H    fentaNYL (Sublimaze) 50 mcg/mL injection 25 mcg, 25 mcg, Intravenous, Q1H PRN    ampicillin-sulbactam (Unasyn) 3 g in sodium chloride 0.9% 100mL IVPB-EUGENE, 3 g, Intravenous, Q6H  [6]   Patient Active Problem List  Diagnosis    Cardiac arrest (HCC)    NSTEMI (non-ST elevated myocardial infarction) (HCC)    Atrial fibrillation (HCC)    Morbid obesity (HCC)    ROSEMARY (acute kidney injury)    Acute respiratory failure with hypoxia (HCC)

## 2025-06-14 NOTE — PLAN OF CARE
Received pt from ER at 2045. Pt sedated, withdrawing to painful stimuli to lower extremities, pupils equal and reactive, intermittent non-purposeful movement to BUE. Weak cough & gag reflexes and corneal reflexes intact. Fentanyl and propofol per MAR for sedation and pain management. #8 ETT in place with FiO2 titrated per RT. Diminished breath sounds. Goal temp <37 degrees C per MD order. Febrile overnight. Ice packs placed. Cooling blanket placed. Tylenol given per MAR. Afib/NSR on tele. R radial arterial line placed overnight with optimal waveform. Flushed and zeroed per protocol. Levophed and Epinephrine gtt per MAR to maintain SBP>90 or MAP>65. OGT in place to LIS. Adamson catheter exchanged for temp sensing adamson after arrival from ER. CVC placed overnight by MD. Accuchecks Q6H.  this AM with morning labs. MD notified. Order given for sliding scale insulin. Family at bedside and updated on POC.  No further needs at this time.

## 2025-06-14 NOTE — PROGRESS NOTES
[facility]   part of [facility]     Hospitalist Progress Note     Josep Frost Patient Status:  Inpatient    1965 MRN [id number]   Location [facility] 4SW-A Attending Jumana Doe MD   Hosp Day # 1 PCP NEELIMA JEFFERSON     Chief Complaint: cardiac arrest    Subjective:     Patient intubated, sedated, on pressors.    Objective:    Review of Systems:   Unable to obtain    Vital signs:  Temp:  [96.4 °F (35.8 °C)-100.2 °F (37.9 °C)] 100.2 °F (37.9 °C)  Pulse:  [] 84  Resp:  [13-43] 23  BP: ()/() 115/46  SpO2:  [76 %-100 %] 97 %  AO: ()/(56-77) 95/64  FiO2 (%):  [60 %-100 %] 100 %    Physical Exam:    General: Intubated, sedated  Respiratory: No wheezes, no rhonchi  Cardiovascular: S1, S2, regular rate and rhythm  Abdomen: Soft, Non-tender, non-distended, positive bowel sounds  Neuro: No new focal deficits.   Extremities: No edema      Diagnostic Data:    Labs:  Recent Labs   Lab 25  0419   WBC 23.9* 32.9*   HGB 15.6 15.6   MCV 93.1 89.9   .0 349.0   BAND 8 11   INR 1.41* 1.23*       Recent Labs   Lab 25  0419   * 233* 311*   BUN 18 20 26*   CREATSERUM 1.61* 1.83* 2.18*   CA 10.0 8.6* 8.4*   ALB 4.1  --  4.1    143 139   K 2.9* 4.2 4.0    101 102   CO2 26.0 30.0 20.0*   ALKPHO 161*  --  105   AST 41*  --  73*   ALT 34  --  44   BILT 0.5  --  0.5   TP 6.4  --  6.4       Estimated Glomerular Filtration Rate: 34 mL/min/1.73m2 (A) (result from lab).    Recent Labs   Lab 25/25  0419   TROPHS 105* 1,120* 786*       Recent Labs   Lab 256 25  0419   PTP 17.4* 15.6*   INR 1.41* 1.23*       Lab Results   Component Value Date    TSH 1.838 2025             Microbiology    No results found for this visit on 25.      Imaging: Reviewed in Epic.    Medications: Scheduled Medications[1]    Assessment & Plan:      # PEA arrest    - thought 2/2  definity contrast reaction    - on Epi, leovphed ggt   - EKG on admission with Afib with RBBB (seen on prior)   - TTE with limited views, though preserved EF, no tamponade physiology or significant valvular abnormalities     - CTA negative for PE   - CTH unremarkable   - Empiric IV abx    - vent management per pulm on consult    - s/p cooling protocol   - close monitoring in CCU; cardiology on consult      # Afib    - holding home flecanide, metoprolol per cardiology    - holding xarelto currently    - s/p successful cardioversion 5/13/2025   - Cardiology following     # NSTEMI, likely type II   - continue trending trops    - cardiology following     # Lactic acidosis - due to above, slightly improved  # ROSEMARY - likely 2/2 arrest, up trending     # Morbid obesity, BMI 50    #PreDM    # Leukocytosis, WBC higher  On empiric abx      Jumana Doe MD    Supplementary Documentation:     Quality:  DVT Mechanical Prophylaxis:   SCDs, Early ambuation  DVT Pharmacologic Prophylaxis   Medication   None                Code Status: Not on file  Tinajero: Tinajero catheter in place  Tinajero Duration (in days): 2  Central line: central venous catheter in place  DERIC:     Discharge is dependent on: progress  At this point Mr. Frost is expected to be discharge to: tbd    The 21st Century Cures Act makes medical notes like these available to patients in the interest of transparency. Please be advised this is a medical document. Medical documents are intended to carry relevant information, facts as evident, and the clinical opinion of the practitioner. The medical note is intended as peer to peer communication and may appear blunt or direct. It is written in medical language and may contain abbreviations or verbiage that are unfamiliar.                         [1]    insulin aspart  1-10 Units Subcutaneous 4 times per day    magnesium sulfate  2 g Intravenous Once    sodium chloride  1,000 mL Intravenous Once    pantoprazole  40 mg  Intravenous Q24H    acetaminophen  650 mg Oral 4 times per day    Or    acetaminophen  650 mg Rectal 4 times per day    busPIRone  30 mg Oral Q8H    ampicillin-sulbactam  3 g Intravenous Q6H

## 2025-06-14 NOTE — PROGRESS NOTES
06/14/25 1303   Vent Information   $ RT Standby Charge (per 15 min) 1   Vent Initiation Date 06/13/25   Ventilation Day(s) 2   Interface Invasive   Vent Type    Vent plugged into main power? Yes   Vent ID 1   Vent Mode VC+   Settings   FiO2 (%) (S)  80 %   Resp Rate (Set) 24   Vt (Set, mL) 550 mL   Waveform Decelerating ramp   PEEP/CPAP (cm H2O) 20 cm H20   Insp Time (sec) 0.9 sec   Insp Rise Time (%) 80 %   Trigger Sensitivity Flow (L/min) 3 L/min   Humidification Heater   H2O Bag Level 1/4 Full   Heater Temperature 98.6 °F (37 °C)   Readings   ETCO2 (mmHg) 34 mmHg   Total RR 24   Minute Ventilation (L/min) 13.6 L/min   Inspiratory Tidal Volume 550 mL   Expiratory Tidal Volume 561 mL   PIP Observed (cm H2O) 43 cm H2O   MAP (cm H2O) 28   I/E Ratio 1:1.8   Plateau Pressure (cm H2O) 36 cm H2O   Static Compliance (L/cm H2O) 40   Alarms   High RR 40   Insp Pressure High (cm H2O) 40 cm H2O   Insp Pressure Low (cm H2O) 14 cm H2O   MV High (L/min) 20 L/min   MV Low (L/min) 2 L/min   Apnea Interval (sec) 20 seconds   Apnea Rate 24   Apnea Volume (mL) 550 mL   ETT   Placement Date/Time: 06/13/25 1649   Airway Size: 8 mm  Cuffed: Cuffed  Insertion attempts: 1  Technique: Lighted stylet;Video laryngoscopy   Secured at (cm) 25 cm   Cuff Pressure (cm H2O) 40 cm H2O   Suctioned? N   Measured From Lips   Secured Location Right   Secured by Commercial tube grimaldo   Site Condition Dry   Site changed Rotated   Req'd equipment at bedside Bag mask     Pt remain on ventilator with above setting. Pt not stable for SBT, FiO2 weaned to 80% from 100 % and Peep increased to 20 per MD.

## 2025-06-14 NOTE — PROGRESS NOTES
06/13/25 2111   Vent Information   $ RT Standby Charge (per 15 min) 1  (Recieved from ER)   $ Ventilator supplies provided Yes   Vent Initiation Date 06/13/25   Vent Initiation Time 2111   Ventilation Day(s) 1   Interface Invasive   Vent Type    Vent plugged into main power? Yes   Vent ID 01   Vent Mode VC+   Settings   FiO2 (%) 60 %   Resp Rate (Set) 24   Vt (Set, mL) 550 mL   Waveform Decelerating ramp   PEEP/CPAP (cm H2O) 10 cm H20   Insp Time (sec) 0.8 sec   Insp Rise Time (%) 70 %   Trigger Sensitivity Flow (L/min) 3 L/min   Humidification Heater   H2O Bag Level 3/4 Full   Heater Temperature 98.6 °F (37 °C)   Readings   ETCO2 (mmHg) 41 mmHg   Total RR 23   Minute Ventilation (L/min) 12 L/min   Inspiratory Tidal Volume 550 mL   Expiratory Tidal Volume 548 mL   PIP Observed (cm H2O) 28 cm H2O   MAP (cm H2O) 17   I/E Ratio 1:2.1   Plateau Pressure (cm H2O) 24 cm H2O   Alarms   High RR 40   Insp Pressure High (cm H2O) 40 cm H2O   MV High (L/min) 20 L/min   MV Low (L/min) 2 L/min   Apnea Interval (sec) 20 seconds   Apnea Rate 24   Apnea Volume (mL) 550 mL   ETT   Placement Date/Time: 06/13/25 1649   Airway Size: 8 mm  Cuffed: Cuffed  Insertion attempts: 1  Technique: Lighted stylet;Video laryngoscopy   Secured at (cm) 24 cm   Cuff Pressure (cm H2O) 28 cm H2O   Suctioned? N   Measured From Lips   Secured Location Center   Secured by Commercial tube grimaldo   Site Condition Dry   Req'd equipment at bedside Bag mask     Received pt from ER on VC+/24/550/60%/+10. Pt resting on above settings. ABG drawn and verified.

## 2025-06-14 NOTE — PROCEDURES
[facility]  part of [facility]    Josep Frost Patient Status:  Inpatient    1965 MRN [id number]   Location [facility] 4SW-A Attending Marcial Robertson, *   Hosp Day # 0 PCP NEELIMA JEFFERSON     Central Line Placement  Indications: Vascular access  Diagnosis: Cardiac arrest, Acute hypoxemic respiratory failure    Anesthesia:   Anesthesia:  Local infiltration  Local anesthetic:  lidocaine 1% without epinephrine    Vital Signs:  Vital signs monitored during sedation.  See flow sheet.    Procedure Details:  Preparation: Skin prepped with 2% chlorhexidine   Skin prep agent completely dried prior to procedure.  Sterile Barriers:  All five maximal sterile barriers used - gloves, gown, cap, mask and large sterile sheet  Hand hygiene:  Hand hygiene performed prior to central venous catheter insertion.  Location:  Right internal jugular  Patient Position:  Flat  Catheter Type:  Triple Lumen    Pre-Procedure:  Landmarks identified  Ultrasound Guidance:  yes  Number of Attempts:  3  Successful placement:  yes    Post Procedure  Post-Procedure:  Line sutured  Assessment:  Blood return through all ports  Complications:  None  Patient tolerance:  Patient tolerated the procedure well with no immediate complications.    Bernardino Lawrence MD  2025

## 2025-06-14 NOTE — PROCEDURES
[facility]    Josep Frost Patient Status:  Inpatient    1965 MRN [id number]   Location [facility] 4SW-A Attending Marcial Robertson, *   Hosp Day # 0 PCP NEELIMA JEFFERSON     Arterial Line Placement    Preparation:  Patient was prepped and draped in usual sterile fashion.    Indications:  Respiratory Failure  Diagnosis: Cardiac arrest, Acute hypoxemic respiratory failure    Location:  R radial    Procedure Details  Ayo's test normal?  yes  Seldinger technique:  yes  Number of attempts:  1    Post Procedure  Post procedure:  Dressing applied  Post-procedure CMS:  normal  Patient tolerance:  Patient tolerated the procedure well with no immediate complications.    Bernardino Lawrence MD  2025

## 2025-06-14 NOTE — PROGRESS NOTES
[facility]  Cardiology Progress Note    Josep Frost Patient Status:  Inpatient    1965 MRN [id number]   Location [facility] 4SW-A Attending Jumana Doe MD   Hosp Day # 1 PCP NEELIMA JEFFERSON       Subjective  Remains intubated, sedated    --------------------------------------------------------------------------------------------------------------------------------  ROS 12 systems reviewed and at baseline, pertinent findings above.      History:  Past Medical History[1]  Past Surgical History[2]  Family History[3]   reports that he has never smoked. He has never used smokeless tobacco. He reports that he does not currently use alcohol. He reports that he does not use drugs.    Objective:   Temp: 100 °F (37.8 °C)  Pulse: 83  Resp: 24  BP: 108/59  AO: 95/63  FiO2 (%): 100 %    Intake/Output:     Intake/Output Summary (Last 24 hours) at 2025 0817  Last data filed at 2025 0716  Gross per 24 hour   Intake 5021.8 ml   Output 1300 ml   Net 3721.8 ml       Physical Exam:    General: Intubated. Obese.  HEENT: Normocephalic.  Neck: Supple.  Cardiac: RRR. S1, S2 normal. No murmur.  Lungs: Mechanical breath sounds.  Extremities: No edema.      Tele NSR    Assessment:    Cardiopulmonary arrest secondary to hypoxia  Anaphylactic reaction following Definity injection  Hx of Afib  Morbid obesity      Plan:  Collapsed following definity in asystole. Suspected anaphylactic reaction, and severe hypoxia. Required CPR x 10 minutes.  Remains intubated and sedated. Unclear neurologic status. TTE with normal LV/RV function. CTA negative for acute PE. No evidence of cardiac decompensation    Discussed with family and ICU team. Cardiology will follow    Discussed with patient. Questions answered.    Please call with questions.     Level of care: L3    Baljinder Fu MD  Interventional Cardiology  [facility]    2025  8:17 AM         [1]   Past Medical History:   Arrhythmia   [2]    Past Surgical History:  Procedure Laterality Date    Ndsc ablation & rcnstj atria lmtd w/o bypass     [3] No family history on file.

## 2025-06-15 ENCOUNTER — APPOINTMENT (OUTPATIENT)
Dept: GENERAL RADIOLOGY | Facility: HOSPITAL | Age: 60
End: 2025-06-15
Attending: INTERNAL MEDICINE
Payer: COMMERCIAL

## 2025-06-15 LAB
ALBUMIN SERPL-MCNC: 3.9 G/DL (ref 3.2–4.8)
ALBUMIN/GLOB SERPL: 1.7 {RATIO} (ref 1–2)
ALP LIVER SERPL-CCNC: 86 U/L (ref 45–117)
ALT SERPL-CCNC: 33 U/L (ref 10–49)
ANION GAP SERPL CALC-SCNC: 10 MMOL/L (ref 0–18)
ANION GAP SERPL CALC-SCNC: 7 MMOL/L (ref 0–18)
ANION GAP SERPL CALC-SCNC: 8 MMOL/L (ref 0–18)
APTT PPP: 28.1 SECONDS (ref 23–36)
APTT PPP: 31.4 SECONDS (ref 23–36)
APTT PPP: 33.5 SECONDS (ref 23–36)
AST SERPL-CCNC: 47 U/L (ref ?–34)
ATRIAL RATE: 153 BPM
BASE EXCESS BLDA CALC-SCNC: 4.1 MMOL/L (ref ?–2)
BASE EXCESS BLDA CALC-SCNC: 4.9 MMOL/L (ref ?–2)
BASOPHILS # BLD AUTO: 0.04 X10(3) UL (ref 0–0.2)
BASOPHILS NFR BLD AUTO: 0.2 %
BILIRUB DIRECT SERPL-MCNC: 0.3 MG/DL (ref ?–0.3)
BILIRUB SERPL-MCNC: 0.8 MG/DL (ref 0.2–1.1)
BODY TEMPERATURE: 98.6 F
BODY TEMPERATURE: 98.6 F
BUN BLD-MCNC: 30 MG/DL (ref 9–23)
BUN BLD-MCNC: 31 MG/DL (ref 9–23)
BUN BLD-MCNC: 37 MG/DL (ref 9–23)
CA-I BLD-SCNC: 1.11 MMOL/L (ref 0.95–1.32)
CA-I BLD-SCNC: 1.14 MMOL/L (ref 0.95–1.32)
CALCIUM BLD-MCNC: 8.7 MG/DL (ref 8.7–10.6)
CALCIUM BLD-MCNC: 8.7 MG/DL (ref 8.7–10.6)
CALCIUM BLD-MCNC: 9 MG/DL (ref 8.7–10.6)
CHLORIDE SERPL-SCNC: 100 MMOL/L (ref 98–112)
CHLORIDE SERPL-SCNC: 100 MMOL/L (ref 98–112)
CHLORIDE SERPL-SCNC: 99 MMOL/L (ref 98–112)
CO2 SERPL-SCNC: 26 MMOL/L (ref 21–32)
CO2 SERPL-SCNC: 28 MMOL/L (ref 21–32)
CO2 SERPL-SCNC: 30 MMOL/L (ref 21–32)
COHGB MFR BLD: 1.8 % SAT (ref 0–3)
COHGB MFR BLD: 2 % SAT (ref 0–3)
CREAT BLD-MCNC: 1.41 MG/DL (ref 0.7–1.3)
CREAT BLD-MCNC: 1.44 MG/DL (ref 0.7–1.3)
CREAT BLD-MCNC: 1.56 MG/DL (ref 0.7–1.3)
EGFRCR SERPLBLD CKD-EPI 2021: 51 ML/MIN/1.73M2 (ref 60–?)
EGFRCR SERPLBLD CKD-EPI 2021: 56 ML/MIN/1.73M2 (ref 60–?)
EGFRCR SERPLBLD CKD-EPI 2021: 57 ML/MIN/1.73M2 (ref 60–?)
EOSINOPHIL # BLD AUTO: 2.1 X10(3) UL (ref 0–0.7)
EOSINOPHIL NFR BLD AUTO: 9.6 %
ERYTHROCYTE [DISTWIDTH] IN BLOOD BY AUTOMATED COUNT: 13.5 %
ERYTHROCYTE [DISTWIDTH] IN BLOOD BY AUTOMATED COUNT: 13.6 %
FIO2: 40 %
FIO2: 40 %
GLOBULIN PLAS-MCNC: 2.3 G/DL (ref 2–3.5)
GLUCOSE BLD-MCNC: 177 MG/DL (ref 70–99)
GLUCOSE BLD-MCNC: 180 MG/DL (ref 70–99)
GLUCOSE BLD-MCNC: 186 MG/DL (ref 70–99)
GLUCOSE BLD-MCNC: 187 MG/DL (ref 70–99)
GLUCOSE BLD-MCNC: 198 MG/DL (ref 70–99)
GLUCOSE BLD-MCNC: 201 MG/DL (ref 70–99)
GLUCOSE BLD-MCNC: 211 MG/DL (ref 70–99)
HCO3 BLDA-SCNC: 28.1 MEQ/L (ref 21–27)
HCO3 BLDA-SCNC: 28.7 MEQ/L (ref 21–27)
HCT VFR BLD AUTO: 35.9 % (ref 39–53)
HCT VFR BLD AUTO: 38.8 % (ref 39–53)
HGB BLD-MCNC: 12.5 G/DL (ref 13–17.5)
HGB BLD-MCNC: 12.7 G/DL (ref 13–17.5)
HGB BLD-MCNC: 12.9 G/DL (ref 13–17.5)
HGB BLD-MCNC: 13.7 G/DL (ref 13–17.5)
IMM GRANULOCYTES # BLD AUTO: 0.29 X10(3) UL (ref 0–1)
IMM GRANULOCYTES NFR BLD: 1.3 %
INR BLD: 1.27 (ref 0.8–1.2)
LACTATE BLD-SCNC: 1.6 MMOL/L (ref 0.5–2)
LACTATE BLD-SCNC: 1.6 MMOL/L (ref 0.5–2)
LYMPHOCYTES # BLD AUTO: 0.99 X10(3) UL (ref 1–4)
LYMPHOCYTES NFR BLD AUTO: 4.5 %
MAGNESIUM SERPL-MCNC: 1.9 MG/DL (ref 1.6–2.6)
MAGNESIUM SERPL-MCNC: 2 MG/DL (ref 1.6–2.6)
MAGNESIUM SERPL-MCNC: 2 MG/DL (ref 1.6–2.6)
MCH RBC QN AUTO: 30.6 PG (ref 26–34)
MCH RBC QN AUTO: 31.3 PG (ref 26–34)
MCHC RBC AUTO-ENTMCNC: 34.8 G/DL (ref 31–37)
MCHC RBC AUTO-ENTMCNC: 35.3 G/DL (ref 31–37)
MCV RBC AUTO: 88 FL (ref 80–100)
MCV RBC AUTO: 88.6 FL (ref 80–100)
METHGB MFR BLD: 0.5 % SAT (ref 0.4–1.5)
METHGB MFR BLD: 0.7 % SAT (ref 0.4–1.5)
MONOCYTES # BLD AUTO: 1.21 X10(3) UL (ref 0.1–1)
MONOCYTES NFR BLD AUTO: 5.5 %
NEUTROPHILS # BLD AUTO: 17.31 X10 (3) UL (ref 1.5–7.7)
NEUTROPHILS # BLD AUTO: 17.31 X10(3) UL (ref 1.5–7.7)
NEUTROPHILS NFR BLD AUTO: 78.9 %
OSMOLALITY SERPL CALC.SUM OF ELEC: 293 MOSM/KG (ref 275–295)
OSMOLALITY SERPL CALC.SUM OF ELEC: 294 MOSM/KG (ref 275–295)
OSMOLALITY SERPL CALC.SUM OF ELEC: 297 MOSM/KG (ref 275–295)
OXYHGB MFR BLDA: 96 % (ref 92–100)
OXYHGB MFR BLDA: 97.1 % (ref 92–100)
P/F RATIO: 203 MMHG
P/F RATIO: 248 MMHG
PCO2 BLDA: 41 MM HG (ref 35–45)
PCO2 BLDA: 41 MM HG (ref 35–45)
PEEP: 10 CM H2O
PEEP: 10 CM H2O
PH BLDA: 7.45 [PH] (ref 7.35–7.45)
PH BLDA: 7.46 [PH] (ref 7.35–7.45)
PLATELET # BLD AUTO: 172 10(3)UL (ref 150–450)
PLATELET # BLD AUTO: 214 10(3)UL (ref 150–450)
PO2 BLDA: 81 MM HG (ref 80–100)
PO2 BLDA: 99 MM HG (ref 80–100)
POTASSIUM BLD-SCNC: 4.2 MMOL/L (ref 3.6–5.1)
POTASSIUM BLD-SCNC: 4.3 MMOL/L (ref 3.6–5.1)
POTASSIUM SERPL-SCNC: 4 MMOL/L (ref 3.5–5.1)
POTASSIUM SERPL-SCNC: 4.3 MMOL/L (ref 3.5–5.1)
POTASSIUM SERPL-SCNC: 4.7 MMOL/L (ref 3.5–5.1)
PROT SERPL-MCNC: 6.2 G/DL (ref 5.7–8.2)
PROTHROMBIN TIME: 16.1 SECONDS (ref 11.6–14.8)
Q-T INTERVAL: 378 MS
Q-T INTERVAL: 454 MS
QRS DURATION: 130 MS
QRS DURATION: 152 MS
QTC CALCULATION (BEZET): 549 MS
QTC CALCULATION (BEZET): 570 MS
R AXIS: 113 DEGREES
R AXIS: 86 DEGREES
RBC # BLD AUTO: 4.08 X10(6)UL (ref 4.3–5.7)
RBC # BLD AUTO: 4.38 X10(6)UL (ref 4.3–5.7)
SODIUM BLD-SCNC: 132 MMOL/L (ref 135–145)
SODIUM BLD-SCNC: 132 MMOL/L (ref 135–145)
SODIUM SERPL-SCNC: 136 MMOL/L (ref 136–145)
T AXIS: 34 DEGREES
T AXIS: 48 DEGREES
TIDAL VOLUME: 550 ML
TIDAL VOLUME: 550 ML
TRIGL SERPL-MCNC: 384 MG/DL (ref 30–149)
VENT RATE: 20 /MIN
VENT RATE: 24 /MIN
VENTRICULAR RATE: 127 BPM
VENTRICULAR RATE: 95 BPM
WBC # BLD AUTO: 13.5 X10(3) UL (ref 4–11)
WBC # BLD AUTO: 21.9 X10(3) UL (ref 4–11)

## 2025-06-15 PROCEDURE — 71045 X-RAY EXAM CHEST 1 VIEW: CPT | Performed by: INTERNAL MEDICINE

## 2025-06-15 PROCEDURE — 99233 SBSQ HOSP IP/OBS HIGH 50: CPT | Performed by: OTHER

## 2025-06-15 PROCEDURE — 99233 SBSQ HOSP IP/OBS HIGH 50: CPT | Performed by: INTERNAL MEDICINE

## 2025-06-15 PROCEDURE — 99291 CRITICAL CARE FIRST HOUR: CPT | Performed by: EMERGENCY MEDICINE

## 2025-06-15 PROCEDURE — 99233 SBSQ HOSP IP/OBS HIGH 50: CPT | Performed by: HOSPITALIST

## 2025-06-15 RX ORDER — HEPARIN SODIUM 1000 [USP'U]/ML
3000 INJECTION, SOLUTION INTRAVENOUS; SUBCUTANEOUS ONCE
Status: COMPLETED | OUTPATIENT
Start: 2025-06-15 | End: 2025-06-15

## 2025-06-15 RX ORDER — HEPARIN SODIUM AND DEXTROSE 10000; 5 [USP'U]/100ML; G/100ML
INJECTION INTRAVENOUS CONTINUOUS
Status: DISCONTINUED | OUTPATIENT
Start: 2025-06-15 | End: 2025-06-17

## 2025-06-15 RX ORDER — HEPARIN SODIUM AND DEXTROSE 10000; 5 [USP'U]/100ML; G/100ML
1000 INJECTION INTRAVENOUS ONCE
Status: COMPLETED | OUTPATIENT
Start: 2025-06-15 | End: 2025-06-15

## 2025-06-15 RX ORDER — DEXMEDETOMIDINE HYDROCHLORIDE 4 UG/ML
INJECTION, SOLUTION INTRAVENOUS CONTINUOUS
Status: DISCONTINUED | OUTPATIENT
Start: 2025-06-15 | End: 2025-06-16

## 2025-06-15 RX ORDER — HEPARIN SODIUM 1000 [USP'U]/ML
5000 INJECTION, SOLUTION INTRAVENOUS; SUBCUTANEOUS ONCE
Status: COMPLETED | OUTPATIENT
Start: 2025-06-15 | End: 2025-06-15

## 2025-06-15 RX ORDER — SODIUM BICARBONATE 650 MG/1
650 TABLET ORAL AS NEEDED
Status: DISCONTINUED | OUTPATIENT
Start: 2025-06-15 | End: 2025-06-16

## 2025-06-15 RX ORDER — FUROSEMIDE 10 MG/ML
80 INJECTION INTRAMUSCULAR; INTRAVENOUS ONCE
Status: COMPLETED | OUTPATIENT
Start: 2025-06-15 | End: 2025-06-15

## 2025-06-15 NOTE — RESPIRATORY THERAPY NOTE
Current Mechanical Ventilator Settings:  - Mode: VC+  - Rate: 24  - Tidal Volume(mL):550  - FiO2: 50%  - PEEP 11  - inspiratory time 0.7    Breath Sounds: Diminished    ETT Size: 8 @25    Measured Parameters:  Peak Inspiratory Pressure(cmH2O): 34  Plateu Pressure(cmH2O) : 28  Tidal Volume(mL): 541  Rate:24    I/O last 3 completed shifts:  In: 5121.8 [I.V.:4921.8; IV PIGGYBACK:200]  Out: 1750 [Urine:1450; Emesis/NG output:300]    VS Range last 24 hrs:  Temp:  [95.4 °F (35.2 °C)-100.4 °F (38 °C)] 96.6 °F (35.9 °C)  Pulse:  [77-88] 83  Resp:  [21-27] 23  BP: ()/(46-77) 133/72  SpO2:  [88 %-100 %] 95 %  AO: ()/(56-96) 103/69  FiO2 (%):  [50 %-100 %] 50 %     [facility] Reference Range & Units 06/14/25 17:35 06/14/25 21:53   ABG PH 7.35 - 7.45  7.39 7.41   ABG PCO2 35 - 45 mm Hg 42 42   ABG PO2 80 - 100 mm Hg 195 (H) 129 (H)   ABG HCO3 21.0 - 27.0 mEq/L 25.1 26.2   ABG BASE EXCESS -2.0 - 2.0 mmol/L 0.2 1.7   Arterial Blood Gas O2Hb 92.0 - 100.0 % 97.4 97.3   TOTAL HEMOGLOBIN 13.0 - 17.5 g/dL 15.1 14.6   METHEMOGLOBIN 0.4 - 1.5 % SAT 1.1 0.8   P/F Ratio mmHg 244.0    POTASSIUM BLOOD GAS 3.6 - 5.1 mmol/L 5.0 5.2 (H)   SODIUM BLOOD  - 145 mmol/L 128 (L) 130 (L)   Lactic Acid (Blood Gas) 0.5 - 2.0 mmol/L 2.7 (H) 2.2 (H)   PATIENT TEMPERATURE F 98.6 98.6   CARBOXYHEMOGLOBIN 0.0 - 3.0 % SAT 1.5 1.7   FiO2 % 80 50   IONIZED CALCIUM 0.95 - 1.32 mmol/L 1.04 1.10   ALLENS TEST  Not Applicable Not Applicable   SAMPLE SITE  Arterial Line Arterial Line   ARTERIAL BLOOD GAS DEVICE      ABG VENT MODE  Volume Control Plus Volume Control Plus   VENT RATE /min 24 24   TIDAL VOLUME mL 550 550   PEEP cm H2O 20.0 13.0   (H): Data is abnormally high  (L): Data is abnormally low    Shift Events noted: ETT rotated throughout shift. Weaned PEEP from 20 to 11 during shift. Weaned FiO2 from 80% to 50% during shift. ABGs done for changes above.     Patient airtrapping with autopeep of 4. I-time adjusted to 0.7, autopeep  down to 2.     Per Dr. Landon Robertson, wean PEEP as long as patient oxygenation is adequate but do not wean PEEP below 10.       Will continue to follow RT protocol.    Dejuan Matthews, RRT

## 2025-06-15 NOTE — PLAN OF CARE
Assumed care of patient at shift change. Received patient resting in bed on ventilator.   Sedated. Sedation holiday during shift. Patient able to follow simple commands and nod yes/no.   Ventilator. RT at bedside-exchanged ETT grimaldo to grimaldo with bite block.   Titrated levophed per patient specific BP goal. Heparin gtt started, see MAR.   No BM. Tinajero intact, see I&O.   Moved patient to speciality bed.   See flowsheet/MAR  Family at bedside during shift.

## 2025-06-15 NOTE — PROGRESS NOTES
[facility]  PHILIPP Neurology Progress Note    Josep Frost Patient Status:  Inpatient    1965 MRN [id number]   Location [facility] 4SW-A Attending Jumana Doe MD   Hosp Day # 2 PCP NEELIMA JEFFERSON     SUBJECTIVE:  His wife and other family members are at the bedside.    MEDICATIONS:  No current outpatient medications on file.     Current Facility-Administered Medications   Medication Dose Route Frequency    amiodarone in dextrose 4.14% (Cordarone) 360 mg/200mL infusion premix  1 mg/min Intravenous Continuous    Followed by    amiodarone in dextrose 4.14% (Cordarone) 360 mg/200mL infusion premix  0.5 mg/min Intravenous Continuous    heparin (Porcine) 76070 units/250mL infusion ACS/AFIB CONTINUOUS  200-3,000 Units/hr Intravenous Continuous    ampicillin-sulbactam (Unasyn) 3 g in sodium chloride 0.9% 100mL IVPB-EUGENE  3 g Intravenous Q6H    dexmedeTOMIDine in sodium chloride 0.9% (Precedex) 400 mcg/100mL infusion premix  0.2-1.5 mcg/kg/hr (Dosing Weight) Intravenous Continuous    dextrose 10% infusion (TPN no rate)   Intravenous Continuous PRN    sodium bicarbonate tab 650 mg  650 mg Per G Tube PRN    And    pancrelipase (Lip-Prot-Amyl) (Zenpep) DR particles cap 10,000 Units  10,000 Units Per G Tube PRN    glucose (Dex4) 15 GM/59ML oral liquid 15 g  15 g Oral Q15 Min PRN    Or    glucose (Glutose) 40% oral gel 15 g  15 g Oral Q15 Min PRN    Or    glucose-vitamin C (Dex-4) chewable tab 4 tablet  4 tablet Oral Q15 Min PRN    Or    dextrose 50% injection 50 mL  50 mL Intravenous Q15 Min PRN    Or    glucose (Dex4) 15 GM/59ML oral liquid 30 g  30 g Oral Q15 Min PRN    Or    glucose (Glutose) 40% oral gel 30 g  30 g Oral Q15 Min PRN    Or    glucose-vitamin C (Dex-4) chewable tab 8 tablet  8 tablet Oral Q15 Min PRN    vasopressin (Vasostrict) 20 Units in sodium chloride 0.9% 100 mL infusion for septic shock  0.01-0.03 Units/min Intravenous Continuous    norepinephrine (Levophed) 8 mg in dextrose 5% 250 mL  infusion  0.5-50 mcg/min Intravenous Continuous    hydrocortisone Na succinate PF (Solu-CORTEF) injection 50 mg  50 mg Intravenous 4 times per day    insulin aspart (NovoLOG) 100 Units/mL FlexPen 2-10 Units  2-10 Units Subcutaneous 4 times per day    fentaNYL in sodium chloride 0.9% (Sublimaze) 1000 mcg/100mL infusion premix   mcg/hr Intravenous Continuous    propofol (Diprivan) 10 mg/mL infusion premix  5-50 mcg/kg/min Intravenous Continuous    acetaminophen (Tylenol Extra Strength) tab 1,000 mg  1,000 mg Oral Q8H PRN    melatonin tab 3 mg  3 mg Oral Nightly PRN    polyethylene glycol (PEG 3350) (Miralax) 17 g oral packet 17 g  17 g Oral Daily PRN    sennosides (Senokot) tab 17.2 mg  17.2 mg Oral Nightly PRN    bisacodyl (Dulcolax) 10 MG rectal suppository 10 mg  10 mg Rectal Daily PRN    ondansetron (Zofran) 4 MG/2ML injection 4 mg  4 mg Intravenous Q6H PRN    prochlorperazine (Compazine) 10 MG/2ML injection 5 mg  5 mg Intravenous Q8H PRN    benzonatate (Tessalon) cap 200 mg  200 mg Oral TID PRN    guaiFENesin (Robitussin) 100 MG/5 ML oral liquid 200 mg  200 mg Oral Q4H PRN    glycerin-hypromellose- (Artificial Tears) 0.2-0.2-1 % ophthalmic solution 1 drop  1 drop Both Eyes QID PRN    sodium chloride (Saline Mist) 0.65 % nasal solution 1 spray  1 spray Each Nare Q3H PRN    sodium chloride 0.9 % IV bolus 1,000 mL  1,000 mL Intravenous Once    pantoprazole (Protonix) 40 mg in sodium chloride 0.9% PF 10 mL IV push  40 mg Intravenous Q24H    acetaminophen (Tylenol) 160 MG/5ML oral liquid 650 mg  650 mg Oral 4 times per day    Or    acetaminophen (Tylenol) rectal suppository 650 mg  650 mg Rectal 4 times per day    busPIRone (Buspar) tab 30 mg  30 mg Oral Q8H    fentaNYL (Sublimaze) 50 mcg/mL injection 25 mcg  25 mcg Intravenous Q1H PRN       PHYSICAL EXAMINATION:  VITAL SIGNS: BP 97/52   Pulse 64   Temp 98.4 °F (36.9 °C)   Resp 24   Ht 75\"   Wt (!) 405 lb 10.3 oz (184 kg)   SpO2 96%   BMI 50.70  kg/m²   GENERAL:  Patient is a 60 year old male in no acute distress. Intubated and sedated.    He was sedated at the time of my initial visit.    I was not present for his sedation holiday but by report from his RN he was following simple commands with all limbs.    DIAGNOSTIC DATA:  Labs:  Recent Labs   Lab 06/13/25  1656 06/14/25  0419 06/15/25  0159 06/15/25  0958   RBC 5.08 5.07 4.38 4.08*   HGB 15.6 15.6 13.7 12.5*   HCT 47.3 45.6 38.8* 35.9*   MCV 93.1 89.9 88.6 88.0   MCH 30.7 30.8 31.3 30.6   MCHC 33.0 34.2 35.3 34.8   RDW 13.2 13.2 13.5 13.6   NEPRELIM 14.46* 29.48* 17.31*  --    WBC 23.9* 32.9* 21.9* 13.5*   .0 349.0 214.0 172.0         Recent Labs   Lab 06/14/25  1444 06/15/25  0159 06/15/25  0840   * 201* 187*   BUN 30* 30* 31*   CREATSERUM 1.84* 1.56* 1.44*   EGFRCR 41* 51* 56*   CA 8.5* 8.7 8.7    136 136   K 4.7 4.7 4.3   CL 99 100 100   CO2 26.0 26.0 28.0         IMAGING:  XR CHEST AP PORTABLE  (CPT=71045)  Result Date: 6/15/2025  CONCLUSION:  Increased interstitial opacities most pronounced in the left lower lobe may represent edema versus infiltrate.   LOCATION:  [facility]      Dictated by (CST): Marcelo Parker MD on 6/15/2025 at 7:45 AM     Finalized by (CST): Marcelo Parker MD on 6/15/2025 at 7:47 AM       XR CHEST AP PORTABLE  (CPT=71045)  Addendum Date: 6/13/2025  ADDENDUM:  Right internal jugular central line has tip in the expected location the SVC.  No pneumothorax.  Dictated by (CST): Nilesh Eagle MD on 6/13/2025 at 10:57 PM     Finalized by (CST): Nilesh Eagle MD on 6/13/2025 at 10:58 PM             Result Date: 6/13/2025  CONCLUSION:   Endotracheal tube tip is 4.6 cm above the earl.  Enteric catheter is not well delineated on this exam.  Recommend repeat radiographs.   LOCATION:  [facility]      Dictated by (CST): Nilesh Eagle MD on 6/13/2025 at 10:22 PM     Finalized by (CST): Nilesh Eagle MD on 6/13/2025 at 10:22 PM       CTA CHEST  (CPT=71275)  Result Date: 6/13/2025  CONCLUSION:   1. No evidence of pulmonary embolus.  2. No suspicious nodule, mass or consolidation.  Dependent reticular nodular densities in the lungs are noted.  3. The patient is intubated.  Enteric catheter traverses the esophagus into the stomach.  No pneumothorax.    LOCATION:  [facility]   Dictated by (CST): Nilesh Eagle MD on 6/13/2025 at 6:44 PM     Finalized by (CST): Nilesh Eagle MD on 6/13/2025 at 6:47 PM       CT BRAIN OR HEAD (CPT=70450)  Result Date: 6/13/2025  PROCEDURE:  CT BRAIN OR HEAD (55949)  COMPARISON:  None.  INDICATIONS:  Full Arrest  TECHNIQUE:  Noncontrast CT scanning is performed through the brain. Dose reduction techniques were used. Dose information is transmitted to the ACR (American College of Radiology) NRDR (National Radiology Data Registry) which includes the Dose Index Registry.  PATIENT STATED HISTORY: (As transcribed by Technologist)  Patient arrives after arresting during procedure, reaction to contrast dye.    FINDINGS: No evidence of hemorrhage or extra-axial fluid collection.  Supra and infratentorial brain parenchyma are unremarkable.  Ventricles and sulci are appropriate for the patient's age.  No mass effect.  Visualized portions of paranasal sinuses are unremarkable.  Visualized portions of mastoid air cells are unremarkable.  Visualized portions of orbits are unremarkable.  IMPRESSION: Unremarkable CT head.    LOCATION:  [facility]   Dictated by (CST): Nilesh Eagle MD on 6/13/2025 at 6:42 PM     Finalized by (CST): Nilesh Eagle MD on 6/13/2025 at 6:43 PM       XR CHEST AP PORTABLE  (CPT=71045)  Result Date: 6/13/2025  CONCLUSION:   Markedly enlarged cardiac silhouette is noted.  This may be due to the heart itself.  Possibility of pericardial effusion cannot be excluded.  Pulmonary vascular congestion is noted.  No focal consolidation.  A large pneumothorax is not identified.   LOCATION:  [facility]      Dictated by  (CST): Nilesh Eagle MD on 2025 at 6:13 PM     Finalized by (CST): Nilesh Eagle MD on 2025 at 6:16 PM     CARD ECHO LIMITED (CPT=93308/57779/77000)  Result Date: 2025  Transthoracic Echocardiogram Name:Josep Frost Date: 2025 :  1965 Ht:  (75in)  BP: 107 / 81 MRN:  [id number]    Age:  60years    Wt:  (380lb) HR: 96bpm Loc:  [facility]       Gndr: M          BSA: 2.88m^2 Sonographer: Phuong BAKER Ordering:    Baljinder Fu Consulting:  Jovanny Figueroa ---------------------------------------------------------------------------- History/Indications:  Full arrest. ---------------------------------------------------------------------------- Procedure information:  A transthoracic echocardiogram, limited study was performed. Additional evaluation included limited 2D.  Patient status: Inpatient.  Location:  Room C1.    This was a STAT study. Transthoracic echocardiography for diagnosis. Image quality was suboptimal. The study was technically limited due to poor acoustic window availability and patient supine. ECG rhythm:   Normal sinus ---------------------------------------------------------------------------- Conclusions: 1. Procedure narrative: Image quality was suboptimal. The study was    technically limited due to poor acoustic window availability and patient    supine. 2. Left ventricle: The cavity size was normal. Wall thickness was increased.    Systolic function was normal. The estimated ejection fraction was 55-60%,    by visual assessment. 3. Right ventricle: The cavity size was at the upper limits of normal.    Systolic function was normal. 4. Left atrium: The left atrial volume was normal. Impressions:  No previous study from [facility] was available for comparison. * ---------------------------------------------------------------------------- * Findings: Left ventricle:  The cavity size was normal. Wall thickness was increased. Systolic function was normal.  The estimated ejection fraction was 55-60%, by visual assessment. Left atrium:  The atrium was normal in size. The left atrial volume was normal. Right ventricle:  The cavity size was at the upper limits of normal. Systolic function was normal. Right atrium:  The atrium was normal in size. Mitral valve:  Not well visualized. Aortic valve:  Not well visualized. Tricuspid valve:  Not well visualized. Pulmonic valve:   Not visualized. Pericardium:  A possible, trivial pericardial effusion was identified. Pulmonary arteries: Not visualized. Systemic veins:  Central venous respirophasic diameter changes are in the normal range (>50%). Inferior vena cava: The IVC was normal-sized. ---------------------------------------------------------------------------- Measurements  Left ventricle                  Value        Ref  IVS thickness, ED, PLAX     (H) 1.1   cm     0.6 - 1.0  LV ID, ED, PLAX                 4.4   cm     4.2 - 5.8  LV ID, ES, PLAX                 2.9   cm     2.5 - 4.0  LV PW thickness, ED, PLAX   (H) 1.1   cm     0.6 - 1.0  IVS/LV PW ratio, ED, PLAX       0.97         ---------  LV PW/LV ID ratio, ED, PLAX     0.26         ---------  LV ejection fraction            64    %      52 - 72  Left atrium                     Value        Ref  LA ID, A-P, ES                  4.0   cm     3.0 - 4.0  LA volume, S                (H) 59    ml     18 - 58  LA volume/bsa, S                20    ml/m^2 16 - 34  LA volume, ES, 1-p A4C          56    ml     18 - 58  LA volume, ES, 1-p A2C      (H) 59    ml     18 - 58  LA volume, ES, A/L              61    ml     ---------  LA volume/bsa, ES, A/L          21    ml/m^2 16 - 34 Legend: (L)  and  (H)  camille values outside specified reference range. ---------------------------------------------------------------------------- Prepared and electronically signed by Ronny Fuchs 06/13/2025 17:57       ASSESSMENT:  Mr. Frost is a 60-year-old man with obesity and atrial fibrillation  who experienced witnessed cardiac arrest, likely secondary to Definity contrast reaction.    PLAN:  Principal Problem:    Cardiac arrest (HCC)  Active Problems:    NSTEMI (non-ST elevated myocardial infarction) (HCC)    Atrial fibrillation (HCC)    Morbid obesity (HCC)    ROSEMARY (acute kidney injury)    Acute respiratory failure with hypoxia (HCC)  By report he is making incremental neurological progress, which is reassuring especially since he has not yet been off sedation.    He has not been experiencing epilepsy and, in light of the clinical circumstances we can discontinue the long-term EEG monitoring.    Continue supportive care and wean sedation as is possible so we can better assess his neurological status.    I am following.    Duncan Hernandez MD

## 2025-06-15 NOTE — PLAN OF CARE
Pt opens eye to voice, does not follow commands. Moves all four extremities with painful stimuli. Cooling blanket on/off throughout the night. Pt went into Afib RVR at around 0145. Amio gtt with bolus started. Convert to NSR at round 0545. Saturating adequately on 50% FiO2 PEEP +10. Adequate urine output. BUE restrained to keep pt from inadvertent harm and to protect medical equipmets. Continuous EEG still in progress. Will continue to closely monitor.      Problem: Safety Risk - Non-Violent Restraints  Goal: Patient will remain free from self-harm  Description: INTERVENTIONS:  - Apply the least restrictive restraint to prevent harm  - Notify patient and family of reasons restraints applied  - Assess for any contributing factors to confusion (electrolyte disturbances, delirium, medications)  - Discontinue any unnecessary medical devices as soon as possible  - Assess the patient's physical comfort, circulation, skin condition, hydration, nutrition and elimination needs   - Reorient and redirection as needed  - Assess for the need to continue restraints  Outcome: Progressing     Problem: CARDIOVASCULAR - ADULT  Goal: Maintains optimal cardiac output and hemodynamic stability  Description: INTERVENTIONS:  - Monitor vital signs, rhythm, and trends  - Monitor for bleeding, hypotension and signs of decreased cardiac output  - Evaluate effectiveness of vasoactive medications to optimize hemodynamic stability  - Monitor arterial and/or venous puncture sites for bleeding and/or hematoma  - Assess quality of pulses, skin color and temperature  - Assess for signs of decreased coronary artery perfusion - ex. Angina  - Evaluate fluid balance, assess for edema, trend weights  Outcome: Progressing  Goal: Absence of cardiac arrhythmias or at baseline  Description: INTERVENTIONS:  - Continuous cardiac monitoring, monitor vital signs, obtain 12 lead EKG if indicated  - Evaluate effectiveness of antiarrhythmic and heart rate control  medications as ordered  - Initiate emergency measures for life threatening arrhythmias  - Monitor electrolytes and administer replacement therapy as ordered  Outcome: Not Progressing     Problem: RESPIRATORY - ADULT  Goal: Achieves optimal ventilation and oxygenation  Description: INTERVENTIONS:  - Assess for changes in respiratory status  - Assess for changes in mentation and behavior  - Position to facilitate oxygenation and minimize respiratory effort  - Oxygen supplementation based on oxygen saturation or ABGs  - Provide Smoking Cessation handout, if applicable  - Encourage broncho-pulmonary hygiene including cough, deep breathe, Incentive Spirometry  - Assess the need for suctioning and perform as needed  - Assess and instruct to report SOB or any respiratory difficulty  - Respiratory Therapy support as indicated  - Manage/alleviate anxiety  - Monitor for signs/symptoms of CO2 retention  Outcome: Not Progressing     Problem: GASTROINTESTINAL - ADULT  Goal: Minimal or absence of nausea and vomiting  Description: INTERVENTIONS:  - Maintain adequate hydration with IV or PO as ordered and tolerated  - Nasogastric tube to low intermittent suction as ordered  - Evaluate effectiveness of ordered antiemetic medications  - Provide nonpharmacologic comfort measures as appropriate  - Advance diet as tolerated, if ordered  - Obtain nutritional consult as needed  - Evaluate fluid balance  Outcome: Progressing  Goal: Maintains or returns to baseline bowel function  Description: INTERVENTIONS:  - Assess bowel function  - Maintain adequate hydration with IV or PO as ordered and tolerated  - Evaluate effectiveness of GI medications  - Encourage mobilization and activity  - Obtain nutritional consult as needed  - Establish a toileting routine/schedule  - Consider collaborating with pharmacy to review patient's medication profile  Outcome: Not Progressing     Problem: METABOLIC/FLUID AND ELECTROLYTES - ADULT  Goal: Glucose  maintained within prescribed range  Description: INTERVENTIONS:  - Monitor Blood Glucose as ordered  - Assess for signs and symptoms of hyperglycemia and hypoglycemia  - Administer ordered medications to maintain glucose within target range  - Assess barriers to adequate nutritional intake and initiate nutrition consult as needed  - Instruct patient on self management of diabetes  Outcome: Progressing  Goal: Electrolytes maintained within normal limits  Description: INTERVENTIONS:  - Monitor labs and rhythm and assess patient for signs and symptoms of electrolyte imbalances  - Administer electrolyte replacement as ordered  - Monitor response to electrolyte replacements, including rhythm and repeat lab results as appropriate  - Fluid restriction as ordered  - Instruct patient on fluid and nutrition restrictions as appropriate  Outcome: Progressing  Goal: Hemodynamic stability and optimal renal function maintained  Description: INTERVENTIONS:  - Monitor labs and assess for signs and symptoms of volume excess or deficit  - Monitor intake, output and patient weight  - Monitor urine specific gravity, serum osmolarity and serum sodium as indicated or ordered  - Monitor response to interventions for patient's volume status, including labs, urine output, blood pressure (other measures as available)  - Encourage oral intake as appropriate  - Instruct patient on fluid and nutrition restrictions as appropriate  Outcome: Progressing     Problem: SKIN/TISSUE INTEGRITY - ADULT  Goal: Skin integrity remains intact  Description: INTERVENTIONS  - Assess and document risk factors for pressure ulcer development  - Assess and document skin integrity  - Monitor for areas of redness and/or skin breakdown  - Initiate interventions, skin care algorithm/standards of care as needed  Outcome: Progressing  Goal: Oral mucous membranes remain intact  Description: INTERVENTIONS  - Assess oral mucosa and hygiene practices  - Implement preventative  oral hygiene regimen  - Implement oral medicated treatments as ordered  Outcome: Progressing     Problem: NEUROLOGICAL - ADULT  Goal: Achieves stable or improved neurological status  Description: INTERVENTIONS  - Assess for and report changes in neurological status  - Initiate measures to prevent increased intracranial pressure  - Maintain blood pressure and fluid volume within ordered parameters to optimize cerebral perfusion and minimize risk of hemorrhage  - Monitor temperature, glucose, and sodium. Initiate appropriate interventions as ordered  Outcome: Not Progressing  Goal: Absence of seizures  Description: INTERVENTIONS  - Monitor for seizure activity  - Administer anti-seizure medications as ordered  - Monitor neurological status  Outcome: Progressing  Goal: Remains free of injury related to seizure activity  Description: INTERVENTIONS:  - Maintain airway, patient safety  and administer oxygen as ordered  - Monitor patient for seizure activity, document and report duration and description of seizure to MD/LIP  - If seizure occurs, turn patient to side and suction secretions as needed  - Reorient patient post seizure  - Seizure pads on all 4 side rails  - Instruct patient/family to notify RN of any seizure activity  - Instruct patient/family to call for assistance with activity based on assessment  Outcome: Progressing  Goal: Achieves maximal functionality and self care  Description: INTERVENTIONS  - Monitor swallowing and airway patency with patient fatigue and changes in neurological status  - Encourage and assist patient to increase activity and self care with guidance from PT/OT  - Encourage visually impaired, hearing impaired and aphasic patients to use assistive/communication devices  Outcome: Not Progressing     Problem: Impaired Functional Mobility  Goal: Achieve highest/safest level of mobility/gait  Description: Interventions:  - Assess patient's functional ability and stability  - Promote  increasing activity/tolerance for mobility and gait  - Educate and engage patient/family in tolerated activity level and precautions    Outcome: Not Progressing     Problem: Impaired Cognition  Goal: Patient will exhibit improved attention, thought processing and/or memory  Description: Interventions:    Outcome: Not Progressing

## 2025-06-15 NOTE — RESPIRATORY THERAPY NOTE
Current Mechanical Ventilator Settings:  - Mode: Acvc+  - Rate: 24  - Tidal Volume(mL):550  - FiO2: 50%  - PEEP +11  - inspiratory time 0.9    Breath Sounds: dim    ETT Size: 8.0    Measured Parameters:  Peak Inspiratory Pressure(cmH2O): 40  Plateu Pressure(cmH2O) : 29  Tidal Volume(mL): 563  Rate:24    Shift Events noted: Received pt on full support. Pt has high peak pressures between 38 -40. sX'd pt . No secretions from ETT tube. Was able to wean fi02 to 50%. No events overnight. Routine care given

## 2025-06-15 NOTE — PROGRESS NOTES
[facility]  Pulmonary/Critical Care progress note    Josep Frost Patient Status:  Inpatient    1965 MRN [id number]   Location [facility] 4SW-A Attending Jumana Doe MD   Hosp Day # 2 PCP NEELIMA JEFFERSON       History of Present Illness:      More responsive to care per family moving all extremities to verbal command    History:  Past Medical History[1]  Past Surgical History[2]  Family History[3]   reports that he has never smoked. He has never used smokeless tobacco. He reports that he does not currently use alcohol. He reports that he does not use drugs.    Allergies:  Allergies[4]    Medications:  Current Hospital Medications[5]    Intake/Output:    Intake/Output Summary (Last 24 hours) at 6/15/2025 1216  Last data filed at 6/15/2025 1055  Gross per 24 hour   Intake 3853.77 ml   Output 2180 ml   Net 1673.77 ml      Body mass index is 50.7 kg/m².    Review of Systems  Review of Systems:   A comprehensive 10 point review of systems was completed.  Pertinent positives and negatives noted in the the HPI.         Patient Vitals for the past 24 hrs:   BP Temp Temp src Pulse Resp SpO2 Height Weight   25 1116 -- (!) 95.5 °F (35.3 °C) Rectal 77 22 -- -- --   25 1000 103/61 -- -- 82 24 -- -- --   25 0926 -- -- -- 80 24 97 % -- --   25 0900 104/59 100.4 °F (38 °C) -- 87 24 -- -- --   25 0813 115/46 100.2 °F (37.9 °C) -- 84 23 97 % 6' 3\" (1.905 m) --   25 0800 115/46 100 °F (37.8 °C) Bladder 83 24 94 % -- --   25 0757 -- -- -- -- -- (!) 89 % -- --   25 0728 -- -- -- 83 24 95 % -- --   25 0715 108/59 100 °F (37.8 °C) -- 84 24 95 % -- --   25 0700 115/65 100 °F (37.8 °C) -- 83 24 94 % -- --   25 0645 118/59 100 °F (37.8 °C) -- 82 24 94 % -- --   25 0630 104/77 100 °F (37.8 °C) -- 83 24 94 % -- --   25 0615 114/50 99.9 °F (37.7 °C) -- 83 24 97 % -- --   25 0600 123/68 99.9 °F (37.7 °C) -- 83 24 96 % -- --   25  0545 116/71 99.9 °F (37.7 °C) -- 85 24 96 % -- --   06/14/25 0530 120/63 99.9 °F (37.7 °C) -- 86 24 95 % -- --   06/14/25 0515 119/66 99.9 °F (37.7 °C) -- 86 23 96 % -- --   06/14/25 0500 112/63 99.9 °F (37.7 °C) Bladder 85 24 95 % -- --   06/14/25 0445 121/63 99.7 °F (37.6 °C) -- 85 21 97 % -- --   06/14/25 0430 116/57 99.7 °F (37.6 °C) -- 84 24 98 % -- --   06/14/25 0415 124/77 99.5 °F (37.5 °C) -- 85 25 100 % -- --   06/14/25 0400 123/71 99.5 °F (37.5 °C) Bladder 84 24 100 % -- --   06/14/25 0345 124/56 99.3 °F (37.4 °C) -- 83 24 98 % -- --   06/14/25 0330 117/67 99.3 °F (37.4 °C) -- 84 24 (!) 88 % -- --   06/14/25 0315 115/66 99.5 °F (37.5 °C) -- 83 24 95 % -- --   06/14/25 0300 112/58 99.5 °F (37.5 °C) Bladder 83 24 94 % -- --   06/14/25 0245 120/55 99.5 °F (37.5 °C) -- 83 24 94 % -- --   06/14/25 0230 110/53 99.5 °F (37.5 °C) -- 82 24 95 % -- --   06/14/25 0215 110/58 99.7 °F (37.6 °C) -- 83 24 93 % -- --   06/14/25 0200 106/56 99.7 °F (37.6 °C) Bladder 84 24 94 % -- --   06/14/25 0145 111/66 99.5 °F (37.5 °C) -- 85 24 95 % -- --   06/14/25 0130 109/60 99.5 °F (37.5 °C) -- 84 24 95 % -- --   06/14/25 0115 102/67 99.5 °F (37.5 °C) -- 86 24 96 % -- --   06/14/25 0100 (!) 85/63 99.5 °F (37.5 °C) -- 87 24 95 % -- --   06/14/25 0045 104/58 99.5 °F (37.5 °C) -- 87 24 95 % -- --   06/14/25 0030 109/62 99.3 °F (37.4 °C) -- 86 24 95 % -- --   06/14/25 0015 101/58 99.3 °F (37.4 °C) -- 86 24 95 % -- --   06/14/25 0000 101/62 99.3 °F (37.4 °C) Bladder 87 24 95 % -- --   06/13/25 2345 102/52 99.3 °F (37.4 °C) -- 87 24 95 % -- --   06/13/25 2330 102/63 99.1 °F (37.3 °C) -- 88 24 94 % -- --   06/13/25 2315 98/51 99.1 °F (37.3 °C) -- 87 24 94 % -- --   06/13/25 2300 94/56 99 °F (37.2 °C) Bladder 86 (!) 27 94 % -- --   06/13/25 2245 99/58 98.4 °F (36.9 °C) -- 87 (!) 32 92 % -- --   06/13/25 2230 (!) 83/56 98.2 °F (36.8 °C) -- 93 25 95 % -- --   06/13/25 2215 115/64 -- -- 90 25 92 % -- --   06/13/25 2200 104/53 -- -- 88 (!) 27  90 % -- --   06/13/25 2145 113/43 -- -- 99 (!) 30 91 % -- --   06/13/25 2130 113/78 -- -- 87 13 93 % -- --   06/13/25 2115 107/61 -- -- 86 (!) 43 94 % -- --   06/13/25 2111 -- -- -- 87 23 94 % -- --   06/13/25 2100 (!) 89/54 -- -- 100 (!) 32 93 % -- --   06/13/25 2045 91/70 98.7 °F (37.1 °C) Temporal 97 19 96 % -- (!) 403 lb 7.1 oz (183 kg)   06/13/25 2030 (!) 119/93 -- -- 87 (!) 32 100 % -- --   06/13/25 2000 117/56 -- -- 88 24 100 % -- --   06/13/25 1945 (!) 131/101 -- -- 92 24 100 % -- --   06/13/25 1930 126/69 -- -- 93 24 99 % -- --   06/13/25 1920 (!) 125/106 -- -- 95 24 99 % -- --   06/13/25 1916 (!) 131/115 -- -- 96 24 99 % -- --   06/13/25 1908 (!) 164/144 -- -- 91 24 100 % -- --   06/13/25 1903 (!) 142/123 -- -- (!) 48 24 99 % -- --   06/13/25 1900 153/79 -- -- 95 24 97 % -- --   06/13/25 1853 134/60 -- -- 87 24 100 % -- --   06/13/25 1848 131/84 -- -- 94 24 100 % -- --   06/13/25 1843 (!) 139/105 -- -- 94 26 98 % -- --   06/13/25 1838 95/84 -- -- 96 25 97 % -- --   06/13/25 1738 (!) 73/31 -- -- 94 24 96 % -- --   06/13/25 1730 (!) 127/99 -- -- 86 23 100 % -- --   06/13/25 1718 -- 96.7 °F (35.9 °C) Temporal -- -- -- -- --   06/13/25 1715 (!) 137/110 -- Temporal (!) 141 18 100 % -- --   06/13/25 1704 -- (!) 96.4 °F (35.8 °C) Temporal -- -- -- -- --   06/13/25 1700 107/81 -- -- 100 18 98 % -- --   06/13/25 1655 119/69 -- -- 101 20 98 % -- --   06/13/25 1650 (!) 184/145 -- -- 105 19 100 % -- --   06/13/25 1645 (!) 125/96 -- -- (!) 123 (!) 37 99 % -- --   06/13/25 1640 -- -- -- (!) 38 (!) 35 (!) 76 % -- --     Vitals:    06/15/25 1030 06/15/25 1045 06/15/25 1100 06/15/25 1115   BP:       BP Location:       Pulse: 68 64 71 68   Resp: 24 24 24 21   Temp: 98.6 °F (37 °C) 98.4 °F (36.9 °C) 98.2 °F (36.8 °C) 98.2 °F (36.8 °C)   TempSrc:       SpO2: 96% 96% 96% 94%   Weight:       Height:             Physical Exam  Constitutional:       General: He is not in acute distress.     Appearance: Normal appearance. He is  obese. He is not ill-appearing or diaphoretic.   HENT:      Head: Normocephalic and atraumatic.      Nose: Nose normal. No congestion or rhinorrhea.      Comments:       Mouth/Throat:      Mouth: Mucous membranes are moist.      Pharynx: Oropharynx is clear. No oropharyngeal exudate or posterior oropharyngeal erythema.      Comments: Oral endotracheal tube  Eyes:      Extraocular Movements: Extraocular movements intact.      Pupils: Pupils are equal, round, and reactive to light.   Cardiovascular:      Rate and Rhythm: Normal rate.      Pulses: Normal pulses.      Heart sounds: Normal heart sounds. No murmur heard.  Pulmonary:      Effort: Pulmonary effort is normal. No respiratory distress.      Breath sounds: Normal breath sounds. No wheezing or rhonchi.      Comments: Diminished breath sounds bilaterally  Chest:      Chest wall: No tenderness.   Abdominal:      General: Abdomen is flat. Bowel sounds are normal.      Palpations: Abdomen is soft.   Musculoskeletal:         General: Normal range of motion.   Skin:     General: Skin is warm.            Lab Data Review:  Recent Labs   Lab 06/13/25 1656 06/14/25 0419 06/15/25  0159 06/15/25  0958   WBC 23.9* 32.9* 21.9* 13.5*   HGB 15.6 15.6 13.7 12.5*   HCT 47.3 45.6 38.8* 35.9*   .0 349.0 214.0 172.0   EOS 0 0  --   --        Recent Labs   Lab 06/13/25  1656 06/13/25 2056 06/14/25 0419 06/14/25  1444 06/15/25  0159 06/15/25  0840      < > 139 136 136 136   K 2.9*   < > 4.0 4.7 4.7 4.3      < > 102 99 100 100   CO2 26.0   < > 20.0* 26.0 26.0 28.0   BUN 18   < > 26* 30* 30* 31*   CREATSERUM 1.61*   < > 2.18* 1.84* 1.56* 1.44*   CA 10.0   < > 8.4* 8.5* 8.7 8.7   ALB 4.1  --  4.1  --  3.9  --    ALKPHO 161*  --  105  --  86  --    ALT 34  --  44  --  33  --    AST 41*  --  73*  --  47*  --    *   < > 311* 211* 201* 187*    < > = values in this interval not displayed.       Recent Labs   Lab 06/14/25  1444 06/15/25  0159 06/15/25  0840   MG  1.9 1.9 2.0       Lab Results   Component Value Date    PHOS 3.9 06/14/2025        Recent Labs   Lab 06/13/25  1656 06/14/25  0419 06/15/25  0159 06/15/25  0840   INR 1.41* 1.23* 1.27*  --    PTT 26.1 24.0  --  28.1       Recent Labs   Lab 06/13/25  2222 06/14/25  1735 06/14/25  2153   ABGPHT 7.25* 7.39 7.41   OIINMX1N 53* 42 42   JGTKJ1H 85 195* 129*   ABGHCO3 21.1 25.1 26.2   ABGBE -4.8* 0.2 1.7   TEMP 98.6 98.6 98.6   DUNG Not Applicable Not Applicable Not Applicable   SITE Arterial Line Arterial Line Arterial Line   DEV      THGB 15.9 15.1 14.6       No results for input(s): \"TROP\", \"CKMB\" in the last 168 hours.    Cultures:   Hospital Encounter on 06/13/25   1. Blood Culture     Status: None (Preliminary result)    Collection Time: 06/13/25  9:40 PM    Specimen: Blood,peripheral   Result Value Ref Range    Blood Culture Result No Growth 1 Day N/A           Radiology personally reviewed:  XR CHEST AP PORTABLE  (CPT=71045)  Result Date: 6/15/2025  CONCLUSION:  Increased interstitial opacities most pronounced in the left lower lobe may represent edema versus infiltrate.   LOCATION:  [facility]      Dictated by (CST): Marcelo Parker MD on 6/15/2025 at 7:45 AM     Finalized by (CST): Marcelo Parker MD on 6/15/2025 at 7:47 AM       XR CHEST AP PORTABLE  (CPT=71045)  Addendum Date: 6/13/2025  ADDENDUM:  Right internal jugular central line has tip in the expected location the SVC.  No pneumothorax.  Dictated by (CST): Nilesh Eagle MD on 6/13/2025 at 10:57 PM     Finalized by (CST): Nilesh Eagle MD on 6/13/2025 at 10:58 PM             Result Date: 6/13/2025  CONCLUSION:   Endotracheal tube tip is 4.6 cm above the earl.  Enteric catheter is not well delineated on this exam.  Recommend repeat radiographs.   LOCATION:  [facility]      Dictated by (CST): Nilesh Eagle MD on 6/13/2025 at 10:22 PM     Finalized by (CST): Nilesh Eagle MD on 6/13/2025 at 10:22 PM       CTA CHEST  (CPT=71275)  Result Date: 6/13/2025  CONCLUSION:   1. No evidence of pulmonary embolus.  2. No suspicious nodule, mass or consolidation.  Dependent reticular nodular densities in the lungs are noted.  3. The patient is intubated.  Enteric catheter traverses the esophagus into the stomach.  No pneumothorax.    LOCATION:  [facility]   Dictated by (CST): Nilesh Eagle MD on 6/13/2025 at 6:44 PM     Finalized by (CST): Nilesh Eagle MD on 6/13/2025 at 6:47 PM       CT BRAIN OR HEAD (CPT=70450)  Result Date: 6/13/2025  PROCEDURE:  CT BRAIN OR HEAD (70269)  COMPARISON:  None.  INDICATIONS:  Full Arrest  TECHNIQUE:  Noncontrast CT scanning is performed through the brain. Dose reduction techniques were used. Dose information is transmitted to the ACR (American College of Radiology) NRDR (National Radiology Data Registry) which includes the Dose Index Registry.  PATIENT STATED HISTORY: (As transcribed by Technologist)  Patient arrives after arresting during procedure, reaction to contrast dye.    FINDINGS: No evidence of hemorrhage or extra-axial fluid collection.  Supra and infratentorial brain parenchyma are unremarkable.  Ventricles and sulci are appropriate for the patient's age.  No mass effect.  Visualized portions of paranasal sinuses are unremarkable.  Visualized portions of mastoid air cells are unremarkable.  Visualized portions of orbits are unremarkable.  IMPRESSION: Unremarkable CT head.    LOCATION:  [facility]   Dictated by (CST): Nilesh Eagle MD on 6/13/2025 at 6:42 PM     Finalized by (CST): Nilesh Eagle MD on 6/13/2025 at 6:43 PM       XR CHEST AP PORTABLE  (CPT=71045)  Result Date: 6/13/2025  CONCLUSION:   Markedly enlarged cardiac silhouette is noted.  This may be due to the heart itself.  Possibility of pericardial effusion cannot be excluded.  Pulmonary vascular congestion is noted.  No focal consolidation.  A large pneumothorax is not identified.   LOCATION:  [facility]      Dictated by  (CST): Nilesh Eagle MD on 6/13/2025 at 6:13 PM     Finalized by (CST): Nilesh Eagle MD on 6/13/2025 at 6:16 PM                 Problem List[6]  60 year old male lifelong non-smoker with history of atrial fibrillation on anticoagulation, who presented to the emergency department on 6/13/2025 with a PEA arrest CPR was initiated..  Patient was intubated and mechanically ventilated.  Reportedly patient had 2 rounds of epinephrine before ROSC was obtained patient's family reports that CPR was performed twice for about 10 minutes.  Pulmonary was consulted for ventilator management.    The patient has been hypotensive and required vasopressors  Assessment:  Acute hypoxic respiratory failure/mechanical ventilation  Bibasilar atelectasis: With improving aeration on chest x-ray  Atrial fibrillation with RVR: Rate controlled  Hypotension/shock: Levophed drip weaned to 2 mcg/min vasopressin drip stopped: Improving  Status post PEA arrest, at risk for anoxic/hypoxic encephalopathy wean FiO2 to keep oxygen saturation between 90% to 92%: With some neurologic improvement per family  Non-STEMI type II demand ischemia  Severe obesity, class III Body mass index is 50.7 kg/m².likely with obesity hypoventilation syndrome and at risk for obstructive apnea syndrome  ROSEMARY  Leukocytosis, suspect reactive, but patient at risk for aspiration pneumonia    Plan:  Wean FiO2 to keep oxygen saturation between 90% 92%  Continue mechanical ventilation  Targeted  temperature management protocol completed   Continue use Unasyn 3 g IV every 6 hours  Hydrocortisone 50 mg IV every 6 hours  Appreciate neurology consult  DVT prophylaxis: SCD boot   GI prophylaxis: Protonix 40 mg daily    Will follow for further recommendations  Follow-up labs, ABG and chest x-ray in a.m.       Thank You for allowing me to participate in this patient's care     Francisco Johnson MD    Note to the patient: The 21st Century Cures Act makes medical notes like these  available to patients in the interest of transparency. However, be advised that this is a medical document. It is intended as peer to peer communication. It is written in medical language and may contain abbreviations or verbiage that are unfamiliar. It may appear blunt or direct. Medical documents are intended to carry relevant information, facts as evident, and clinical opinion of the practitioner.      Disclaimer: Components of this note were documented using voice recognition system and are subject to errors not corrected at proofreading. Contact the author of this note for any clarifications          [1]   Past Medical History:   Arrhythmia   [2]   Past Surgical History:  Procedure Laterality Date    Ndsc ablation & rcnstj atria lmtd w/o bypass     [3] No family history on file.  [4]   Allergies  Allergen Reactions    Definity ANAPHYLAXIS and CARDIAC ARREST    Hydrocodone-Acetaminophen SHORTNESS OF BREATH     Vicodin   [5]   Current Facility-Administered Medications:     [COMPLETED] amiodarone in dextrose 4.21% (Cordarone) 150 mg/100mL BOLUS infusion premix 150 mg, 150 mg, Intravenous, Once **FOLLOWED BY** [] amiodarone in dextrose 4.14% (Cordarone) 360 mg/200mL infusion premix, 1 mg/min, Intravenous, Continuous **FOLLOWED BY** amiodarone in dextrose 4.14% (Cordarone) 360 mg/200mL infusion premix, 0.5 mg/min, Intravenous, Continuous    heparin (Porcine) 43579 units/250mL infusion ACS/AFIB CONTINUOUS, 200-3,000 Units/hr, Intravenous, Continuous    ampicillin-sulbactam (Unasyn) 3 g in sodium chloride 0.9% 100mL IVPB-EUGENE, 3 g, Intravenous, Q6H    dexmedeTOMIDine in sodium chloride 0.9% (Precedex) 400 mcg/100mL infusion premix, 0.2-1.5 mcg/kg/hr (Dosing Weight), Intravenous, Continuous    dextrose 10% infusion (TPN no rate), , Intravenous, Continuous PRN    sodium bicarbonate tab 650 mg, 650 mg, Per G Tube, PRN **AND** pancrelipase (Lip-Prot-Amyl) (Zenpep) DR particles cap 10,000 Units, 10,000 Units, Per G  Tube, PRN    glucose (Dex4) 15 GM/59ML oral liquid 15 g, 15 g, Oral, Q15 Min PRN **OR** glucose (Glutose) 40% oral gel 15 g, 15 g, Oral, Q15 Min PRN **OR** glucose-vitamin C (Dex-4) chewable tab 4 tablet, 4 tablet, Oral, Q15 Min PRN **OR** dextrose 50% injection 50 mL, 50 mL, Intravenous, Q15 Min PRN **OR** glucose (Dex4) 15 GM/59ML oral liquid 30 g, 30 g, Oral, Q15 Min PRN **OR** glucose (Glutose) 40% oral gel 30 g, 30 g, Oral, Q15 Min PRN **OR** glucose-vitamin C (Dex-4) chewable tab 8 tablet, 8 tablet, Oral, Q15 Min PRN    vasopressin (Vasostrict) 20 Units in sodium chloride 0.9% 100 mL infusion for septic shock, 0.01-0.03 Units/min, Intravenous, Continuous    norepinephrine (Levophed) 8 mg in dextrose 5% 250 mL infusion, 0.5-50 mcg/min, Intravenous, Continuous    hydrocortisone Na succinate PF (Solu-CORTEF) injection 50 mg, 50 mg, Intravenous, 4 times per day    insulin aspart (NovoLOG) 100 Units/mL FlexPen 2-10 Units, 2-10 Units, Subcutaneous, 4 times per day    fentaNYL in sodium chloride 0.9% (Sublimaze) 1000 mcg/100mL infusion premix,  mcg/hr, Intravenous, Continuous    propofol (Diprivan) 10 mg/mL infusion premix, 5-50 mcg/kg/min, Intravenous, Continuous    acetaminophen (Tylenol Extra Strength) tab 1,000 mg, 1,000 mg, Oral, Q8H PRN    melatonin tab 3 mg, 3 mg, Oral, Nightly PRN    polyethylene glycol (PEG 3350) (Miralax) 17 g oral packet 17 g, 17 g, Oral, Daily PRN    sennosides (Senokot) tab 17.2 mg, 17.2 mg, Oral, Nightly PRN    bisacodyl (Dulcolax) 10 MG rectal suppository 10 mg, 10 mg, Rectal, Daily PRN    ondansetron (Zofran) 4 MG/2ML injection 4 mg, 4 mg, Intravenous, Q6H PRN    prochlorperazine (Compazine) 10 MG/2ML injection 5 mg, 5 mg, Intravenous, Q8H PRN    benzonatate (Tessalon) cap 200 mg, 200 mg, Oral, TID PRN    guaiFENesin (Robitussin) 100 MG/5 ML oral liquid 200 mg, 200 mg, Oral, Q4H PRN    glycerin-hypromellose- (Artificial Tears) 0.2-0.2-1 % ophthalmic solution 1 drop, 1  drop, Both Eyes, QID PRN    sodium chloride (Saline Mist) 0.65 % nasal solution 1 spray, 1 spray, Each Nare, Q3H PRN    sodium chloride 0.9 % IV bolus 1,000 mL, 1,000 mL, Intravenous, Once    pantoprazole (Protonix) 40 mg in sodium chloride 0.9% PF 10 mL IV push, 40 mg, Intravenous, Q24H    acetaminophen (Tylenol) 160 MG/5ML oral liquid 650 mg, 650 mg, Oral, 4 times per day **OR** acetaminophen (Tylenol) rectal suppository 650 mg, 650 mg, Rectal, 4 times per day    busPIRone (Buspar) tab 30 mg, 30 mg, Oral, Q8H    fentaNYL (Sublimaze) 50 mcg/mL injection 25 mcg, 25 mcg, Intravenous, Q1H PRN  [6]   Patient Active Problem List  Diagnosis    Cardiac arrest (HCC)    NSTEMI (non-ST elevated myocardial infarction) (HCC)    Atrial fibrillation (HCC)    Morbid obesity (HCC)    ROSEMARY (acute kidney injury)    Acute respiratory failure with hypoxia ([facility])

## 2025-06-15 NOTE — PROGRESS NOTES
06/15/25 0936   Vent Information   $ RT Standby Charge (per 15 min) 1   Vent Initiation Date 06/13/25   Ventilation Day(s) 3   Interface Invasive   Vent Type    Vent plugged into main power? Yes   Vent ID 1   Vent Mode VC+   Settings   FiO2 (%) 40 %   Resp Rate (Set) 24   Vt (Set, mL) 550 mL   Waveform Decelerating ramp   PEEP/CPAP (cm H2O) 10 cm H20   Insp Time (sec) 0.9 sec   Insp Rise Time (%) 50 %   Trigger Sensitivity Flow (L/min) 3 L/min   Humidification Heater   H2O Bag Level 3/4 Full   Heater Temperature 98.6 °F (37 °C)   Readings   ETCO2 (mmHg) 33 mmHg   Total RR 24   Minute Ventilation (L/min) 13.1 L/min   Inspiratory Tidal Volume 550 mL   Expiratory Tidal Volume 577 mL   PIP Observed (cm H2O) 40 cm H2O   MAP (cm H2O) 12   I/E Ratio 1:1.8   Plateau Pressure (cm H2O) 30 cm H2O   Static Compliance (L/cm H2O) 28   Alarms   High RR 40   Insp Pressure High (cm H2O) 50 cm H2O   Insp Pressure Low (cm H2O) 14 cm H2O   MV High (L/min) 20 L/min   MV Low (L/min) 3 L/min   Apnea Interval (sec) 20 seconds   Apnea Rate 24   Apnea Volume (mL) 550 mL   ETT   Placement Date/Time: 06/13/25 1649   Airway Size: 8 mm  Cuffed: Cuffed  Insertion attempts: 1  Technique: Lighted stylet;Video laryngoscopy   Secured at (cm) 25 cm   Cuff Pressure (cm H2O) 30 cm H2O   Suctioned? N   Measured From Lips   Secured Location Right   Secured by Commercial tube grimaldo   Site Condition Dry   Site changed Rotated   Req'd equipment at bedside Bag mask     Pt remain stable on ventilator with above setting. Pt not stable for weaning.

## 2025-06-15 NOTE — PROGRESS NOTES
[facility]  Cardiology Progress Note    Josep Frost Patient Status:  Inpatient    1965 MRN [id number]   Location [facility] 4SW-A Attending Jumana Doe MD   Hosp Day # 2 PCP NEELIMA JEFFERSON       Subjective:  Still intubated and sedated  Opens eyes, questionable if purposeful movements or not at this point    --------------------------------------------------------------------------------------------------------------------------------  ROS 12 systems reviewed and at baseline, pertinent findings above.      History:  Past Medical History[1]  Past Surgical History[2]  Family History[3]   reports that he has never smoked. He has never used smokeless tobacco. He reports that he does not currently use alcohol. He reports that he does not use drugs.    Objective:   Temp: 98.2 °F (36.8 °C)  Pulse: 73  Resp: 24  BP: 119/46  AO: 110/74  FiO2 (%): 40 %    Intake/Output:     Intake/Output Summary (Last 24 hours) at 6/15/2025 0831  Last data filed at 6/15/2025 0636  Gross per 24 hour   Intake 3715.12 ml   Output 2080 ml   Net 1635.12 ml       Physical Exam:    General: Intubated.  HEENT: Normocephalic.  Neck: Supple.  Cardiac: RRR. S1, S2 normal. No murmur.  Lungs: Mechanical breath sounds.  Extremities: No edema.        Assessment:    Cardiopulmonary arrest secondary to hypoxia  Anaphylactic reaction following Definity injection  PAF  Morbid obesity     Plan:  Collapsed following definity in asystole. Suspected anaphylactic reaction, and severe hypoxia. Required CPR x 10 minutes.  Remains intubated and sedated. Unclear neurologic status. TTE with normal LV/RV function. CTA negative for acute PE. No evidence of cardiac decompensation  Intermittent PAF episodes. Start heparin  Will allow more time and assess for neurologic recovery      Discussed with patient. Questions answered.    Please call with questions.     Level of care: L3    Baljinder Fu MD  Interventional Cardiology  [place]  [facility] [place]    6/15/2025  8:31 AM         [1]   Past Medical History:   Arrhythmia   [2]   Past Surgical History:  Procedure Laterality Date    Ndsc ablation & rcnstj atria lmtd w/o bypass     [3] No family history on file.

## 2025-06-15 NOTE — PROGRESS NOTES
[facility]   part of [facility]     Hospitalist Progress Note     Josep Frost Patient Status:  Inpatient    1965 MRN [id number]   Location [facility] 4SW-A Attending Jumana Doe MD   Hosp Day # 2 PCP NEELIMA JEFFERSON     Chief Complaint: cardiac arrest    Subjective:     Patient intubated, sedated, on pressors, Fi02 down to 50%, PEEP of 10, pressor requirement improved.    Objective:    Review of Systems:   Unable to obtain    Vital signs:  Temp:  [95.4 °F (35.2 °C)-100.4 °F (38 °C)] 98.2 °F (36.8 °C)  Pulse:  [] 68  Resp:  [21-28] 21  BP: ()/(46-82) 97/52  SpO2:  [92 %-99 %] 94 %  AO: ()/(61-96) 110/72  FiO2 (%):  [40 %-80 %] 40 %    Physical Exam:    General: Intubated, sedated  Respiratory: No wheezes, no rhonchi  Cardiovascular: S1, S2, regular rate and rhythm  Abdomen: Soft, Non-tender, non-distended, positive bowel sounds  Neuro: No new focal deficits.   Extremities: No edema      Diagnostic Data:    Labs:  Recent Labs   Lab 06/13/25  1656 06/14/25  0419 06/15/25  0159 06/15/25  0958   WBC 23.9* 32.9* 21.9* 13.5*   HGB 15.6 15.6 13.7 12.5*   MCV 93.1 89.9 88.6 88.0   .0 349.0 214.0 172.0   BAND 8 11  --   --    INR 1.41* 1.23* 1.27*  --        Recent Labs   Lab 256 259 25  1444 06/15/25  0159 06/15/25  0840   *   < > 311* 211* 201* 187*   BUN 18   < > 26* 30* 30* 31*   CREATSERUM 1.61*   < > 2.18* 1.84* 1.56* 1.44*   CA 10.0   < > 8.4* 8.5* 8.7 8.7   ALB 4.1  --  4.1  --  3.9  --       < > 139 136 136 136   K 2.9*   < > 4.0 4.7 4.7 4.3      < > 102 99 100 100   CO2 26.0   < > 20.0* 26.0 26.0 28.0   ALKPHO 161*  --  105  --  86  --    AST 41*  --  73*  --  47*  --    ALT 34  --  44  --  33  --    BILT 0.5  --  0.5  --  0.8  --    TP 6.4  --  6.4  --  6.2  --     < > = values in this interval not displayed.       Estimated Glomerular Filtration Rate: 56 mL/min/1.73m2 (A) (result from  lab).    Recent Labs   Lab 06/13/25 1656 06/13/25 2056 06/14/25  0419   TROPHS 105* 1,120* 786*       Recent Labs   Lab 06/13/25 1656 06/14/25 0419 06/15/25  0159   PTP 17.4* 15.6* 16.1*   INR 1.41* 1.23* 1.27*                    Microbiology    Hospital Encounter on 06/13/25   1. Blood Culture     Status: None (Preliminary result)    Collection Time: 06/13/25  9:40 PM    Specimen: Blood,peripheral   Result Value Ref Range    Blood Culture Result No Growth 1 Day N/A         Imaging: Reviewed in Epic.    Medications: Scheduled Medications[1]    Assessment & Plan:      # PEA arrest    - thought 2/2 definity contrast reaction    - on Epi, leovphed ggt   - EKG on admission with Afib with RBBB (seen on prior)   - TTE with limited views, though preserved EF, no tamponade physiology or significant valvular abnormalities     - CTA negative for PE   - CTH unremarkable   - Empiric IV abx    - vent management per pulm on consult    - s/p cooling protocol   - on stress steroids   - close monitoring in CCU; cardiology on consult      # Afib    - holding home flecanide, metoprolol per cardiology    - holding xarelto currently    - s/p successful cardioversion 5/13/2025   - Cardiology following     # NSTEMI, likely type II   - continue trending trops    - cardiology following     # Lactic acidosis - due to above, slightly improved  # ROSEMARY - likely 2/2 arrest, up trending     # Morbid obesity, BMI 50    #PreDM, A1c 6.0    # Leukocytosis, WBC higher  On empiric abx  Improved       Jumana Doe MD    Supplementary Documentation:     Quality:  DVT Mechanical Prophylaxis:   SCDs, Early ambuation  DVT Pharmacologic Prophylaxis   Medication    heparin (Porcine) 44565 units/250mL infusion ACS/AFIB CONTINUOUS                Code Status: DNAR/Full Treatment  Tinajero: Tinajero catheter in place  Tinajero Duration (in days): 2  Central line: central venous catheter in place  DERIC:     Discharge is dependent on: progress  At this point Mr. Frost  is expected to be discharge to: tbd    The 21st Century Cures Act makes medical notes like these available to patients in the interest of transparency. Please be advised this is a medical document. Medical documents are intended to carry relevant information, facts as evident, and the clinical opinion of the practitioner. The medical note is intended as peer to peer communication and may appear blunt or direct. It is written in medical language and may contain abbreviations or verbiage that are unfamiliar.                         [1]    ampicillin-sulbactam  3 g Intravenous Q6H    hydrocortisone Na succinate PF  50 mg Intravenous 4 times per day    insulin aspart  2-10 Units Subcutaneous 4 times per day    sodium chloride  1,000 mL Intravenous Once    pantoprazole  40 mg Intravenous Q24H    acetaminophen  650 mg Oral 4 times per day    Or    acetaminophen  650 mg Rectal 4 times per day    busPIRone  30 mg Oral Q8H

## 2025-06-15 NOTE — PROGRESS NOTES
CRITICAL CARE PROGRESS NOTE    Patient Name: Josep Frost  : 1965  MRN: [id number]  Admit Date: 2025  Length of Stay: 2 Days    Subjective/24h events: Reportedly some intermittent tracking FiO2 down to 50% PEEP of 10  Norepinephrine and vasopressin requirements coming down on fentanyl and propofol    Assessment and Plan: 60-year-old male status post cardiac arrest possible anaphylactic reaction    Neuro/Psych: Anoxic encephalopathy targeted temperature management complete  Avoid fevers  Sedation vacation to assess for mental status  May need to transition with Precedex note made of triglyceride level    Cardiovascular: Cardiac arrest presumably secondary to anaphylactic reaction in the setting of likely pickwickian/EL causing hypoxia and then cardiac arrest prolonged CPR time 15 minutes plus  Current shock likely vasoplegic/anaphylactic unclear based on venous saturation would favor both  CVP in the high teens translated from body habitus so likely artifactually high  Continue to wean vasopressors    Known history of A-fib with ablation now had RVR overnight on amiodarone drip patient compliant at home with anticoagulation will give half dose bolus and A-fib heparin drip protocol    Pulmonary: Hypoxemic respiratory failure patient's degree of hypoxia is significantly out of proportion to his CT scan findings therefore likely mostly shunt due to body habitus as well as possible burgeoning aspiration less likely  Continue to wean FiO2 and PEEP    GI: No ischemic hepatopathy that is pertinent  Continue GI prophylaxis  Will start tube feeding today    Renal/Metabolic: Acute kidney injury improving    Heme: No significant anemia thrombocytopenia or coagulopathy    ID: Leukocytosis downtrending significant although patient on hydrocortisone please see below  Continue Unasyn for 5 days await culture data    Endocrine: No known history of endocrinopathy  Sugars elevated will up sliding scale likely  secondary to steroids    Steroids were only started anecdotally given the possibility this was an anaphylactic reaction we will continue for 5 days      ICU checklist  Feeding: Feeding today  Analgesia: Propofol Precedex  Sedation: Fentanyl  Thromboembolism PPX: Heparin drip  Stress Ulcer PPX: PPI  Glucose control: Sliding scale  Bowel Regimen:   Lines/drains/tubes:   Deescalation of antibiotics: Unasyn  Therapies:PT/OT/ST when appropriate    Full code    Upon my evaluation, this patient had a high probability of imminent or life-threatening deterioration due to circulatory failure and respiratory failure, which required my direct attention, intervention, and personal management.    I have personally provided 50 minutes of critical care time, exclusive of time spent on separately billable procedures.  Time includes review of all pertinent laboratory/radiology results, discussion with consultants, and monitoring for potential decompensation.  Performed interventions included pressor drugs and managing mechanical ventilation.              Hemodynamics  24h Vitals:  VitalLast Value (24 Hour)24 Hour Range  Jvxt886.4 °F (38 °C)Temp  Min: 96.4 °F (35.8 °C)  Max: 100.4 °F (38 °C)  [id number]  Min: 38  Max: 141  BP (Non-Invasive)104/59 BP  Min: 73/31  Max: 184/145  BP (A-Line)91/66AO  Min: 76/68  Max: 117/72  CVP  could not be evaluated. This SmartLink does not work with rows of the type:   [id number]  Min: 13  Max: 43  [id number] %SpO2  Min: 76 %  Max: 100 %  O2 Therapy

## 2025-06-16 ENCOUNTER — APPOINTMENT (OUTPATIENT)
Dept: GENERAL RADIOLOGY | Facility: HOSPITAL | Age: 60
End: 2025-06-16
Attending: INTERNAL MEDICINE
Payer: COMMERCIAL

## 2025-06-16 PROBLEM — J96.02 ACUTE HYPERCAPNIC RESPIRATORY FAILURE (HCC): Status: ACTIVE | Noted: 2025-06-13

## 2025-06-16 LAB
ALBUMIN SERPL-MCNC: 3.6 G/DL (ref 3.2–4.8)
ALBUMIN/GLOB SERPL: 1.6 {RATIO} (ref 1–2)
ALP LIVER SERPL-CCNC: 66 U/L (ref 45–117)
ALT SERPL-CCNC: 25 U/L (ref 10–49)
ANION GAP SERPL CALC-SCNC: 8 MMOL/L (ref 0–18)
ANION GAP SERPL CALC-SCNC: 9 MMOL/L (ref 0–18)
APTT PPP: 32.6 SECONDS (ref 23–36)
APTT PPP: 34.6 SECONDS (ref 23–36)
APTT PPP: 36.2 SECONDS (ref 23–36)
AST SERPL-CCNC: 31 U/L (ref ?–34)
BASE EXCESS BLDA CALC-SCNC: 8.1 MMOL/L (ref ?–2)
BASOPHILS # BLD AUTO: 0.02 X10(3) UL (ref 0–0.2)
BASOPHILS NFR BLD AUTO: 0.2 %
BILIRUB DIRECT SERPL-MCNC: 0.2 MG/DL (ref ?–0.3)
BILIRUB SERPL-MCNC: 0.4 MG/DL (ref 0.2–1.1)
BODY TEMPERATURE: 98.6 F
BUN BLD-MCNC: 33 MG/DL (ref 9–23)
BUN BLD-MCNC: 36 MG/DL (ref 9–23)
CALCIUM BLD-MCNC: 8.7 MG/DL (ref 8.7–10.6)
CALCIUM BLD-MCNC: 9 MG/DL (ref 8.7–10.6)
CHLORIDE SERPL-SCNC: 98 MMOL/L (ref 98–112)
CHLORIDE SERPL-SCNC: 99 MMOL/L (ref 98–112)
CO2 SERPL-SCNC: 30 MMOL/L (ref 21–32)
CO2 SERPL-SCNC: 34 MMOL/L (ref 21–32)
COHGB MFR BLD: 2 % SAT (ref 0–3)
CREAT BLD-MCNC: 1.24 MG/DL (ref 0.7–1.3)
CREAT BLD-MCNC: 1.4 MG/DL (ref 0.7–1.3)
EGFRCR SERPLBLD CKD-EPI 2021: 58 ML/MIN/1.73M2 (ref 60–?)
EGFRCR SERPLBLD CKD-EPI 2021: 67 ML/MIN/1.73M2 (ref 60–?)
EOSINOPHIL # BLD AUTO: 0 X10(3) UL (ref 0–0.7)
EOSINOPHIL NFR BLD AUTO: 0 %
ERYTHROCYTE [DISTWIDTH] IN BLOOD BY AUTOMATED COUNT: 13.6 %
FIO2: 40 %
GLOBULIN PLAS-MCNC: 2.2 G/DL (ref 2–3.5)
GLUCOSE BLD-MCNC: 132 MG/DL (ref 70–99)
GLUCOSE BLD-MCNC: 139 MG/DL (ref 70–99)
GLUCOSE BLD-MCNC: 144 MG/DL (ref 70–99)
GLUCOSE BLD-MCNC: 172 MG/DL (ref 70–99)
GLUCOSE BLD-MCNC: 197 MG/DL (ref 70–99)
GLUCOSE BLD-MCNC: 243 MG/DL (ref 70–99)
HCO3 BLDA-SCNC: 31.3 MEQ/L (ref 21–27)
HCT VFR BLD AUTO: 32.9 % (ref 39–53)
HGB BLD-MCNC: 11.2 G/DL (ref 13–17.5)
HGB BLD-MCNC: 11.9 G/DL (ref 13–17.5)
IMM GRANULOCYTES # BLD AUTO: 0.26 X10(3) UL (ref 0–1)
IMM GRANULOCYTES NFR BLD: 2.4 %
INR BLD: 1.09 (ref 0.8–1.2)
LYMPHOCYTES # BLD AUTO: 0.86 X10(3) UL (ref 1–4)
LYMPHOCYTES NFR BLD AUTO: 8.1 %
MAGNESIUM SERPL-MCNC: 2.1 MG/DL (ref 1.6–2.6)
MCH RBC QN AUTO: 30.2 PG (ref 26–34)
MCHC RBC AUTO-ENTMCNC: 34 G/DL (ref 31–37)
MCV RBC AUTO: 88.7 FL (ref 80–100)
METHGB MFR BLD: 0.4 % SAT (ref 0.4–1.5)
MONOCYTES # BLD AUTO: 0.58 X10(3) UL (ref 0.1–1)
MONOCYTES NFR BLD AUTO: 5.4 %
NEUTROPHILS # BLD AUTO: 8.95 X10 (3) UL (ref 1.5–7.7)
NEUTROPHILS # BLD AUTO: 8.95 X10(3) UL (ref 1.5–7.7)
NEUTROPHILS NFR BLD AUTO: 83.9 %
OSMOLALITY SERPL CALC.SUM OF ELEC: 300 MOSM/KG (ref 275–295)
OSMOLALITY SERPL CALC.SUM OF ELEC: 302 MOSM/KG (ref 275–295)
OXYHGB MFR BLDA: 97.3 % (ref 92–100)
P/F RATIO: 223 MMHG
PCO2 BLDA: 45 MM HG (ref 35–45)
PEEP: 8 CM H2O
PH BLDA: 7.47 [PH] (ref 7.35–7.45)
PLATELET # BLD AUTO: 163 10(3)UL (ref 150–450)
PO2 BLDA: 89 MM HG (ref 80–100)
POTASSIUM SERPL-SCNC: 3.5 MMOL/L (ref 3.5–5.1)
POTASSIUM SERPL-SCNC: 3.9 MMOL/L (ref 3.5–5.1)
PRESSURE SUPPORT: 5 CM H2O
PROT SERPL-MCNC: 5.8 G/DL (ref 5.7–8.2)
PROTHROMBIN TIME: 14.2 SECONDS (ref 11.6–14.8)
RBC # BLD AUTO: 3.71 X10(6)UL (ref 4.3–5.7)
SODIUM SERPL-SCNC: 137 MMOL/L (ref 136–145)
SODIUM SERPL-SCNC: 141 MMOL/L (ref 136–145)
WBC # BLD AUTO: 10.7 X10(3) UL (ref 4–11)

## 2025-06-16 PROCEDURE — 99232 SBSQ HOSP IP/OBS MODERATE 35: CPT | Performed by: HOSPITALIST

## 2025-06-16 PROCEDURE — 99291 CRITICAL CARE FIRST HOUR: CPT | Performed by: EMERGENCY MEDICINE

## 2025-06-16 PROCEDURE — 99232 SBSQ HOSP IP/OBS MODERATE 35: CPT | Performed by: INTERNAL MEDICINE

## 2025-06-16 PROCEDURE — 99233 SBSQ HOSP IP/OBS HIGH 50: CPT | Performed by: OTHER

## 2025-06-16 PROCEDURE — 5A09357 ASSISTANCE WITH RESPIRATORY VENTILATION, LESS THAN 24 CONSECUTIVE HOURS, CONTINUOUS POSITIVE AIRWAY PRESSURE: ICD-10-PCS | Performed by: HOSPITALIST

## 2025-06-16 PROCEDURE — 71045 X-RAY EXAM CHEST 1 VIEW: CPT | Performed by: INTERNAL MEDICINE

## 2025-06-16 RX ORDER — FUROSEMIDE 10 MG/ML
40 INJECTION INTRAMUSCULAR; INTRAVENOUS
Status: DISCONTINUED | OUTPATIENT
Start: 2025-06-16 | End: 2025-06-17

## 2025-06-16 RX ORDER — HEPARIN SODIUM 1000 [USP'U]/ML
3000 INJECTION, SOLUTION INTRAVENOUS; SUBCUTANEOUS ONCE
Status: COMPLETED | OUTPATIENT
Start: 2025-06-16 | End: 2025-06-16

## 2025-06-16 RX ORDER — HYDROCORTISONE SODIUM SUCCINATE 100 MG/2ML
25 INJECTION INTRAMUSCULAR; INTRAVENOUS EVERY 12 HOURS
Status: COMPLETED | OUTPATIENT
Start: 2025-06-17 | End: 2025-06-17

## 2025-06-16 RX ORDER — HYDROCORTISONE SODIUM SUCCINATE 100 MG/2ML
50 INJECTION INTRAMUSCULAR; INTRAVENOUS EVERY 12 HOURS
Status: COMPLETED | OUTPATIENT
Start: 2025-06-16 | End: 2025-06-16

## 2025-06-16 NOTE — RESPIRATORY THERAPY NOTE
Current Mechanical Ventilator Settings:  - Mode: VC+  - Rate: 20  - Tidal Volume(mL):550  - FiO2: 40%  - PEEP 8  - inspiratory time 0.9    Breath Sounds: Diminished    ETT Size: 8 @25    Measured Parameters:  Peak Inspiratory Pressure(cmH2O): 24  Plateu Pressure(cmH2O) : 22  Tidal Volume(mL): 558  Rate:20    I/O last 3 completed shifts:  In: 3853.8 [I.V.:3453.8; IV PIGGYBACK:400]  Out: 1750 [Urine:1080; Emesis/NG output:670]    VS Range last 24 hrs:  Temp:  [95.5 °F (35.3 °C)-99.3 °F (37.4 °C)] 99 °F (37.2 °C)  Pulse:  [] 55  Resp:  [19-28] 20  BP: ()/(42-82) 105/60  SpO2:  [92 %-100 %] 95 %  AO: ()/(53-83) 101/53  FiO2 (%):  [40 %-60 %] 40 %     [facility] Reference Range & Units 06/15/25 15:08 06/15/25 16:06   ABG PH 7.35 - 7.45  7.45 7.46 (H)   ABG PCO2 35 - 45 mm Hg 41 41   ABG PO2 80 - 100 mm Hg 81 99   ABG HCO3 21.0 - 27.0 mEq/L 28.1 (H) 28.7 (H)   ABG BASE EXCESS -2.0 - 2.0 mmol/L 4.1 (H) 4.9 (H)   Arterial Blood Gas O2Hb 92.0 - 100.0 % 96.0 97.1   TOTAL HEMOGLOBIN 13.0 - 17.5 g/dL 12.7 (L) 12.9 (L)   METHEMOGLOBIN 0.4 - 1.5 % SAT 0.7 0.5   P/F Ratio mmHg 203.0 248.0   POTASSIUM BLOOD GAS 3.6 - 5.1 mmol/L 4.2 4.3   SODIUM BLOOD  - 145 mmol/L 132 (L) 132 (L)   Lactic Acid (Blood Gas) 0.5 - 2.0 mmol/L 1.6 1.6   PATIENT TEMPERATURE F 98.6 98.6   CARBOXYHEMOGLOBIN 0.0 - 3.0 % SAT 2.0 1.8   FiO2 % 40 40   IONIZED CALCIUM 0.95 - 1.32 mmol/L 1.11 1.14   ALLENS TEST  Not Applicable Not Applicable   SAMPLE SITE  Arterial Line Arterial Line   ARTERIAL BLOOD GAS DEVICE      ABG VENT MODE  Volume Control Plus Volume Control Plus   VENT RATE /min 24 20   TIDAL VOLUME mL 550 550   PEEP cm H2O 10.0 10.0   (H): Data is abnormally high  (L): Data is abnormally low    Shift Events noted: ETT rotated throughout shift. Patient rate adjusted to 20 per Dr. Landon Robertson. ABG done after adjustment listed above. Secretions are scant and tan with inline suction.    PEEP adjusted from 10 to 8.     Patient  awake, responding to questions by nodding head with this RT.     Keep PEEP at 8 overnight. Plan for SBT tomorrow.     Will continue to follow RT protocol.    Dejuan Matthews, RRT

## 2025-06-16 NOTE — PAYOR COMM NOTE
--------------  ADMISSION REVIEW     Payor: [facility] CORE/NAVIGATE  Subscriber #:  [id number]  Authorization Number: [id number]    Admit date: 6/13/25  Admit time:  8:34 PM    Patient Seen in: [facility] Emergency Department    Stated Complaint: Full Arrest    Patient is a 60-year-old male history of atrial fibrillation coming in from cardiology suite for cardiac arrest.  Patient went for echocardiogram.  He was given a dye and subsequently arrested.  CPR was started immediately and prior to arrival patient was given 4 rounds of epi via IO.  He was also given 2 A of bicarbSupraglottic airway placed by EMS.  ROSC was achieved however upon arrival patient found to be pulseless again.    Past Medical History:    Arrhythmia     Past Surgical History:   Procedure Laterality Date    Ndsc ablation & rcnstj atria lmtd w/o bypass       ED Triage Vitals   BP 06/13/25 1645 (!) 125/96   Pulse 06/13/25 1640 (!) 38   Resp 06/13/25 1640 (!) 35   Temp 06/13/25 1704 (!) 96.4 °F (35.8 °C)   Temp src 06/13/25 1704 Temporal   SpO2 06/13/25 1640 (!) 76 %   O2 Device 06/13/25 1715 Ventilator     Current Vitals:   Vital Signs  BP: 117/56  Pulse: 88  Resp: 24  Temp: 96.7 °F (35.9 °C)  Temp src: Temporal  MAP (mmHg): 74    Oxygen Therapy  SpO2: 100 %  O2 Device: Ventilator  FiO2 (%): (S) 60 %    Physical Exam  Vitals and nursing note reviewed.   Constitutional:       Comments: Unresponsive    HENT:      Head: Normocephalic and atraumatic.      Mouth/Throat:      Comments: Supraglottic airway in place  Eyes:      Conjunctiva/sclera: Conjunctivae normal.   Cardiovascular:      Comments: pulseless  Pulmonary:      Comments: Bvm breath sounds  Abdominal:      Comments: protuberant   Musculoskeletal:      Right lower leg: Edema present.      Left lower leg: Edema present.      Comments: Left tibial IO   Skin:     Comments: Cyanotic, dusky    Neurological:      Comments: Unresponsive      Labs Reviewed   CBC WITH DIFFERENTIAL WITH PLATELET -  Abnormal; Notable for the following components:       Result Value    WBC 23.9 (*)     Neutrophil Absolute Prelim 14.46 (*)     All other components within normal limits   COMP METABOLIC PANEL (14) - Abnormal; Notable for the following components:    Glucose 201 (*)     Potassium 2.9 (*)     Creatinine 1.61 (*)     Calculated Osmolality 306 (*)     eGFR-Cr 49 (*)     AST 41 (*)     Alkaline Phosphatase 161 (*)     All other components within normal limits   TROPONIN I HIGH SENSITIVITY - Abnormal; Notable for the following components:    Troponin I (High Sensitivity) 105 (*)     All other components within normal limits   PROTHROMBIN TIME (PT) - Abnormal; Notable for the following components:    PT 17.4 (*)     INR 1.41 (*)     All other components within normal limits   LIPASE - Abnormal; Notable for the following components:    Lipase 66 (*)     All other components within normal limits   AMMONIA, PLASMA - Abnormal; Notable for the following components:    Ammonia 86 (*)     All other components within normal limits   LACTIC ACID, PLASMA - Abnormal; Notable for the following components:    Lactic Acid 9.0 (*)     All other components within normal limits   ABG PANEL W ELECT AND LACTATE - Abnormal; Notable for the following components:    ABG pH 7.10 (*)     ABG pCO2 86 (*)     ABG pO2 224 (*)     ABG HCO3 20.6 (*)     ABG Base Excess -5.6 (*)     Lactic Acid (Blood Gas) 7.1 (*)     All other components within normal limits   BLOOD CULTURE   BLOOD CULTURE   BLOOD CULTURE   BLOOD CULTURE   SPUTUM CULTURE     EKG 95  Atrial Fibrillation  Reading: Right bundle branch block, left posterior fascicular block    CTA CHEST  1. No evidence of pulmonary embolus.  2. No suspicious nodule, mass or consolidation.  Dependent reticular nodular densities in the lungs are noted.  3. The patient is intubated.  Enteric catheter traverses the esophagus into the stomach.  No pneumothorax.        CT BRAIN OR HEAD    Unremarkable CT head.         XR CHEST AP PORTABLE    Markedly enlarged cardiac silhouette is noted.  This may be due to the heart itself.  Possibility of pericardial effusion cannot be excluded.  Pulmonary vascular congestion is noted.  No focal consolidation.  A large pneumothorax is not identified.       The patient was intubated via orotracheal route using a 8.0 mm endotracheal tube.  Rapid sequence induction medications used:   Positioning was confirmed using auscultation and CO2 detector.  Post intubation chest xray was ordered.     Medical Decision Making  The differential includes the following  Anaphylaxis, hypoxic respiratory failure leading to cardiac arrest, PE, sepsis though less likely, metabolic derangement  Pertinent comorbidities include  Atrial fibrillation on xarelto    Labs  ABG 7.1/86/224 on initial vent settings with RR of 20, 100% FiO2 and peep of 10  Lactic 7.1   White blood cell count 23.9, hemoglobin 15.6 platelets 245  Potassium 2.9, creatinine 1.61, CO2 26    Imaging studies  I reviewed the images and my independent interpreation after review is cardiomegaly and findings concerning for potential edema on portable cxr.     ER course  On arrival patient reportedly had ROSC however appeared cyanotic.  His staff were preparing to move him to the bed pulses were rechecked and patient found to be pulseless.  CPR initiated and 6 dose of cardiac epi administered through IO as peripheral IVs are being inserted.  Patient also given calcium gluconate.  Blood sugar was found to be 193. Pulse check with PEA.  Patient given 7 the dose of epi.  I gel removed and 8 oh ET tube inserted cardiac epi given.  This was done simultaneously during our second pulse check which revealed ROSC.  EKG shows A-fib without ST elevation. Cardiology at bedside.  Patient started on epi drip due to concerns for potential anaphylaxis being the inciting factor.  Cardiology recommended discussing case with MICU and obtaining CT head CT chest with  angio to rule out PE.  Bedside echo however did not show dilated RV.    MICU consulted, Dr. Robertson who saw the patient here in the emergency room down at bedside.    Recommendations made to provide antibiotics.  Blood cultures were already obtained.  Patient's lactic elevated but  likely secondary to cardiac arrest and lack of organ perfusion as opposed to sepsis.  Patient received a total of 1.5 L of normal saline.  After I reviewed his portable chest x-ray I did not provide additional fluids.     Patient requiring additional Levophed for pressor support in addition to epinephrine drip.  CT head without evidence of intracranial hemorrhage or other acute pathology.  CT a of chest without PE.  There are reticular opacities in the bases.  ET tube and OG tube appear in appropriate position.    I did speak with the patient's wife who noted that he had been in his usual state of health though she notes he had a cough several months ago and has been dealing with some lower extremity swelling after his ablation back in April.  Patient's daughter here at bedside as well and she is a nurse.    Patient ultimately admitted to the ICU for further management.    Patient did not receive 30ml/kg of fluids despite having: elevated Lactate. The reason for doing so is: a concern for fluid overload. 1500mL of fluids were given instead   Disposition and Plan     Clinical Impression:  1. Cardiac arrest (HCC)    2. Acute respiratory failure with hypoxia (HCC)         History and Physical     History of Present Illness: Josep Frost is a 60 year old male with PMHx Afib on anticoagulation who presents for PEA arrest.      Patient intubated and sedated, unable to provide significant history.  All history obtained from ER signout.  Patient noted to be in normal state of health today per patient's wife, when he arrived for outpatient echocardiogram.  Patient received Definity contrast for echocardiogram.  During the procedure he noted to  become hypoxic, apneic and was in PEA arrest.  CPR started immediately, status post bicarb x 2, epi x 2, calcium.  Patient wheeled to the ER and subsequently obtained ROSC.   Patient's wife notes that he was in normal state of health prior to echocardiogram today, denies any fevers, chills, chest pain, shortness of breath, abdominal pain.  Does note that he  has history of difficulty control A-fib, recently had ablation procedure in April after which she started developing lower extremity edema and cough.  Afterward had cardioversion on 5/13/2025 which was successful and felt like he was in normal sinus rhythm afterwards.     Physical Exam:    BP (!) 137/110   Pulse (!) 141   Temp 96.7 °F (35.9 °C) (Temporal)   Resp 18   SpO2 100%   General: Intubated and sedated   HEENT: Normocephalic atraumatic. Moist mucous membranes. EOM-I. PERRLA. Anicteric.  Neck: No lymphadenopathy. No JVD. No carotid bruits.  Respiratory: Clear to auscultation bilaterally. No wheezes. No rhonchi.  Cardiovascular: S1, S2. Regular rate and rhythm. No murmurs, rubs or gallops. Equal pulses.   Chest and Back: No tenderness or deformity.  Abdomen: Soft, nontender, nondistended.  Neurologic: Intubated and sedated, no spontaneous extremity movements noted   Extremities: 1+ pitting edema to bilateral shins      ASSESSMENT / PLAN:      # PEA arrest    - thought 2/2 definity contrast reaction    - on Epi, leovphed ggt   - EKG on admission with Afib with RBBB (seen on prior)   - TTE with limited views, though preserved EF, no tamponade physiology or significant valvular abnormalities     - CT C/A/P pending   - CTH pending    - Empiric IV abx    - vent management per pulm on consult    - close monitoring in CCU; cardiology on consult    - Targeted temp management per critical care      # Afib    - holding home flecanide, metoprolol per cardiology    - holding xarelto currently    - s/p successful cardioversion 5/13/2025   - Cardiology on consult       # NSTEMI, likely type II   - continue trending trops    - cardiology on consult      # Lactic acidosis - IVF, trend   # ROSEMARY - likely 2/2 arrest, trend chem      # Morbid obesity Ht: 6' 3\" (190.5 cm), Last Wt: 411 lb 9.6 oz (186.7 kg), BMI: 51.45 kg/m²      CARDS:    Josep Frost is a a(n) 60 year old male who presents to our office for an echocardiogram.  Patient received IV contrast for the echocardiogram and soon after became short of breath, hypoxic and cyanotic.  He lost consciousness and then lost his pulse. CPR was performed in the outpatient setting.  EMS arrived and ROSC was achieved.  He was transferred to the emergency department where further CPR was performed after loss of pulse.  We then received ROSC once again.  Currently intubated and on the ventilator.     Josep Frost is a a(n) 60 year old male who presents to our office for an echocardiogram.  Patient received IV contrast for the echocardiogram and soon after became short of breath, hypoxic and cyanotic.  He lost consciousness and then lost his pulse. CPR was performed in the outpatient setting.  EMS arrived and ROSC was achieved.  He was transferred to the emergency department where further CPR was performed after loss of pulse.  We then received ROSC once again.  Currently intubated and on the ventilator.      Impression:  CardioPulmonary Arrest possibly due to Anaphylactic Reaction  Atrial Fibrillation  Acute Respiratory Failure  Morbid Obesity     Recommendations:  Patient became hypoxic and went to cardiac arrest following Difinity injection for an echocardiogram.  Received CPR and ACLS protocol.  Has significant downtime.  Currently with ROSC  Continue epi, wean as tolerated  On the ventilator management per MICU  Echocardiogram was repeated shows normal function, normal RV size and function  CT of the thorax pending  Will continue to follow closely    6/14:    REMAINS VENTED IN ICU    NEURO:    HISTORY OF PRESENT ILLNESS:    Margot is a 60-year-old man with atrial fibrillation and obesity who was admitted yesterday after cardiac arrest seemingly induced by Definity contrast while getting an outpatient echocardiogram.  The patient is comatose and unable to provide clinical history.  The history is from chart review, although his wife is also at the bedside.    There is unclear how long he was \"down\" as there are varying reports of timing, with 1 suggestion of 10 minutes other characterized as \"significant.\"  He received immediate CPR at the time of arrest and apparently achieved return of spontaneous circulation but as he was taken to the hospital he was apparently pulseless again and another round of CPR was performed, such that total time \"down\" is unclear.  He was otherwise at his baseline prior to undergoing outpatient echocardiogram yesterday.    PHYSICAL EXAMINATION:  VITAL SIGNS: /46   Pulse 84   Temp 100.2 °F (37.9 °C)   Resp 23   Ht 75\"   Wt (!) 403 lb 7.1 oz (183 kg)   SpO2 97%   BMI 50.43 kg/m²   GENERAL:  Patient is a 60 year old male in no acute distress, intubated and sedated.     NEUROLOGICAL:   Mental status: does not alert to voice of noxious stimuli, will open eyes to noxious stimuli  Cranial Nerves: conjugate gaze, no VOR, no corneal, + cough/gag  Motor: opens eyes to noxious stimuli, no movement  Tendon Reflexes: normal for habitus, no asymmetry  Gait: Deferred     Lab 06/13/25 1656 06/14/25 0419   RBC 5.08 5.07   HGB 15.6 15.6   HCT 47.3 45.6   MCV 93.1 89.9   MCH 30.7 30.8   MCHC 33.0 34.2   RDW 13.2 13.2   NEPRELIM 14.46* 29.48*   WBC 23.9* 32.9*   .0 349.0      Lab 06/13/25  1656 06/13/25 2056 06/14/25  0419   * 233* 311*   BUN 18 20 26*   CREATSERUM 1.61* 1.83* 2.18*   EGFRCR 49* 42* 34*   CA 10.0 8.6* 8.4*    143 139   K 2.9* 4.2 4.0    101 102   CO2 26.0 30.0 20.0*        ASSESSMENT:  Mr. Frost is a 60-year-old man with obesity and atrial fibrillation who experienced  witnessed cardiac arrest, likely secondary to Definity contrast reaction.     PLAN:  Principal Problem:    Cardiac arrest (HCC)  Active Problems:    NSTEMI (non-ST elevated myocardial infarction) (HCC)    Atrial fibrillation (HCC)    Morbid obesity (HCC)    ROSEMARY (acute kidney injury)    Acute respiratory failure with hypoxia (HCC)  Continue supportive care and wean sedation as is possible to get as accurate a neurological assessment as is achievable.    We can pursue long-term EEG monitor which can screen for epilepsy and have prognostic implications.    I discussed with his wife that it is too early to prognosticate, and only if he remains comatose after several days with no other explanation would be started to have overt concern for anoxic encephalopathy.    PULM:    History of Present Illness:  Josep Frost is a a(n) 60 year old male lifelong non-smoker with history of atrial fibrillation on anticoagulation, who presented to the emergency department on 6/13/2025 with a PEA arrest CPR was initiated..  Patient was intubated and mechanically ventilated.  Reportedly patient had 2 rounds of epinephrine before ROSC was obtained patient's family reports that CPR was performed twice for about 10 minutes.  Pulmonary was consulted for ventilator management.     The patient has been hypotensive and required vasopressors    Assessment:  Acute hypoxic respiratory failure/mechanical ventilation  Atrial fibrillation with RVR: Rate controlled  Hypotension/shock  Status post PEA arrest, at risk for anoxic/hypoxic encephalopathy wean FiO2 to keep oxygen saturation between 90% to 92%  Non-STEMI type II demand ischemia  Severe obesity, class III Body mass index is 50.43 kg/m².likely with obesity hypoventilation syndrome and at risk for obstructive apnea syndrome  ROSEMARY  Leukocytosis, suspect reactive, but patient at risk for aspiration pneumonia     Plan:  Wean FiO2 to keep oxygen saturation between 90% 92%  Continue mechanical  ventilation  Targeted  temperature management protocol  Continue use Unasyn 3 g IV every 6 hours  Hydrocortisone 50 mg IV every 6 hours  DVT prophylaxis: SCD boot   GI prophylaxis: Protonix 40 mg daily    Will follow for further recommendations      6/15:    REMAINS VENTED IN ICU    CARDS:    Still intubated and sedated  Opens eyes, questionable if purposeful movements or not at this point     Objective:   Temp: 98.2 °F (36.8 °C)  Pulse: 73  Resp: 24  BP: 119/46  AO: 110/74  FiO2 (%): 40 %    Physical Exam:     General: Intubated.  HEENT: Normocephalic.  Neck: Supple.  Cardiac: RRR. S1, S2 normal. No murmur.  Lungs: Mechanical breath sounds.  Extremities: No edema.     XR CHEST AP PORTABLE  Result Date: 6/15/2025  Increased interstitial opacities most pronounced in the left lower lobe may represent edema versus infiltrate.     Assessment:    Cardiopulmonary arrest secondary to hypoxia  Anaphylactic reaction following Definity injection  PAF  Morbid obesity     Plan:  Collapsed following definity in asystole. Suspected anaphylactic reaction, and severe hypoxia. Required CPR x 10 minutes.  Remains intubated and sedated. Unclear neurologic status. TTE with normal LV/RV function. CTA negative for acute PE. No evidence of cardiac decompensation  Intermittent PAF episodes. Start heparin  Will allow more time and assess for neurologic recovery    PULM:      The patient has been hypotensive and required vasopressors  Assessment:  Acute hypoxic respiratory failure/mechanical ventilation  Bibasilar atelectasis: With improving aeration on chest x-ray  Atrial fibrillation with RVR: Rate controlled  Hypotension/shock: Levophed drip weaned to 2 mcg/min vasopressin drip stopped: Improving  Status post PEA arrest, at risk for anoxic/hypoxic encephalopathy wean FiO2 to keep oxygen saturation between 90% to 92%: With some neurologic improvement per family  Non-STEMI type II demand ischemia  Severe obesity, class III Body mass index  is 50.7 kg/m².likely with obesity hypoventilation syndrome and at risk for obstructive apnea syndrome  ROSEMARY  Leukocytosis, suspect reactive, but patient at risk for aspiration pneumonia     Plan:  Wean FiO2 to keep oxygen saturation between 90% 92%  Continue mechanical ventilation  Targeted  temperature management protocol completed   Continue use Unasyn 3 g IV every 6 hours  Hydrocortisone 50 mg IV every 6 hours  Appreciate neurology consult  DVT prophylaxis: SCD boot   GI prophylaxis: Protonix 40 mg daily    Will follow for further recommendations  Follow-up labs, ABG and chest x-ray in a.m.    6/16:     REMAINS VENTED IN ICU    CRITICAL CARE:     Subjective/24h events: 3 of Precedex this morning off vasopressors still on PEEP of 8  SBT spoke to RT already responded to 80 of IV Lasix last night with approximately 2 L     Assessment and Plan: 60-year-old male status post cardiac arrest possible anaphylactic reaction     Neuro/Psych: Anoxic encephalopathy status post targeted temperature management now appears to be very appropriate neurologically  Neurology following     Cardiovascular: Cardiac arrest presumably secondary to anaphylactic reaction in the setting of likely pickwickian/EL causing hypoxia and then cardiac arrest versus air embolism??  Echo reviewed fairly benign  No further shock  Off vasopressors      Known history of A-fib with ablation on amiodarone some bradycardia but appears to be temporally related to Precedex which will be taken off so continue amiodarone for now cardiology following  On heparin drip     Pulmonary: Hypoxemic respiratory failure now on minimal vent settings positive and respiratory pressure at 8 given his body habitus would do pressure support trial of this and then extubate to equivalent with positive pressure on Vapotherm     GI: No ischemic hepatopathy that is pertinent  Continue GI prophylaxis  Potential swallow study was on tube feeds     Renal/Metabolic: Acute kidney  injury improving status post Lasix 80 mg last night and adequate diuresis  Will give 1 more dose this morning 40 x 1     Heme: No significant anemia thrombocytopenia or coagulopathy     ID: Leukocytosis resolved culture data unrevealing  Unasyn for 5 days likely overkill        Endocrine: No known history of endocrinopathy  Sugars elevated will up sliding scale likely secondary to steroids     Steroids were only started anecdotally given the possibility this was an anaphylactic reaction we we will taper off over the next 48 hours      ICU checklist  Feeding: Possible swallow study today  Analgesia: Precedex  Sedation:   Thromboembolism PPX: Heparin drip  Stress Ulcer PPX: PPI  Glucose control: Sliding scale  Bowel Regimen:   Lines/drains/tubes:   Deescalation of antibiotics: Unasyn 5 days  Therapies:PT/OT/ST when appropriate     NEURO:    He is awake and interactive with his family today, and all are elated.     PHYSICAL EXAMINATION:  VITAL SIGNS: /60   Pulse 56   Temp 98.8 °F (37.1 °C)   Resp 19   Ht 75\"   Wt (!) 411 lb 9.6 oz (186.7 kg)   SpO2 100%   BMI 51.45 kg/m²   GENERAL:  Patient is a 60 year old male in no acute distress.     NEUROLOGICAL:   Mental status: Oriented to person, place, and time  Speech & Language: Fluent, no dysarthria but has dysphonia  Comprehension: Intact to multi step commands  Cranial Nerves: VFF, PERRL, EOMI, no nystagmus, facial sensation intact, face symmetric, tongue midline, shoulder shrug equal  Motor: tone, bulk normal; strength is mildly weak in all flexors and extensors   Sensory: Intact to light touch in all limbs  Tendon Reflexes: normal for habitus, no asymmetry  Gait: Deferred     Lab 06/14/25  0419 06/15/25  0159 06/15/25  0958 06/16/25  0408   RBC 5.07 4.38 4.08* 3.71*   HGB 15.6 13.7 12.5* 11.2*   HCT 45.6 38.8* 35.9* 32.9*   MCV 89.9 88.6 88.0 88.7   MCH 30.8 31.3 30.6 30.2   MCHC 34.2 35.3 34.8 34.0   RDW 13.2 13.5 13.6 13.6   NEPRELIM 29.48* 17.31*  --   8.95*   WBC 32.9* 21.9* 13.5* 10.7   .0 214.0 172.0 163.0      Lab 06/15/25  0840 06/15/25  2208 06/16/25  0408   * 211* 243*   BUN 31* 37* 36*   CREATSERUM 1.44* 1.41* 1.40*   EGFRCR 56* 57* 58*   CA 8.7 9.0 8.7    136 137   K 4.3 4.0 3.9    99 99   CO2 28.0 30.0 30.0     ASSESSMENT:  Mr. Frost is a 60-year-old man with obesity and atrial fibrillation who experienced witnessed cardiac arrest, likely secondary to Definity contrast reaction, recovering well and no serious anoxic encephalopathy noted thus far.     PLAN:  Principal Problem:    Cardiac arrest (HCC)  Active Problems:    NSTEMI (non-ST elevated myocardial infarction) (HCC)    Atrial fibrillation (HCC)    Morbid obesity (HCC)    ROSEMARY (acute kidney injury)    Acute respiratory failure with hypoxia (HCC)     We discussed how he may have some \"short term memory\" issues in the coming days to weeks, as this is very common to the point of being essentially expected, but that often improve over weeks to months with good supportive care. He can follow up with neurology when out of the hospital to check on this and to see if neuropsychometrics might be needed if there are persistent problems.     Otherwise, he needs quality and regularity of sleep, meals, hydration and physical activity as well as day/night cycle integrity. He will need PT/OT/ST/rehab evaluation as well.     No further urgent neurological diagnostics or therapeutics indicated. That being the case neurology will sign off but call back should concerns arise.    CARDS:    Assessment:  59yo presented to [facility] office for an Echo, soon after receiving Definity became SOB,hypoxic, and then lost his pulse. Ryhtm initially PEA reportedly, then asystolic. ROSC after 10min of cpr     Cardiopulmonary arrest, PEA then Asystole  Extubated, without anoxic encephalopathy  ? R/t Anaphylaxis s/p Definity, gettting steroids   Echo with EF 60%, no WMA, no sig valvular issues   Type II  MI  Demand in setting of arrest     Volume overload  Iatrogenic role, +5.8L with IV administration  Cvp 19 yesterday while intubated      Paroxysmal Afib, flutter, SSS  Onset AF rvr yesterday, not back in SR on Amiodarone loading  Was on Flecainide prior to admission  Recurrent s/p ablation prior to admission  [id number]= tbd, was on Xarelto prior to admission      Morbid Obesity  EL-compliant with cpap prior to admission   ROSEMARY  Cr improving with diuresis     Plan:     Lasix 40mg iv bid, as reviewed with critical care  Follow cvp  Recommend Resume anticoagulation for afib    MEDICATIONS ADMINISTERED IN LAST 1 DAY:    amiodarone in dextrose 4.14% (Cordarone) 360 mg/200mL infusion premix       Date Action Dose Route User    6/16/2025 0702 New Bag 0.5 mg/min Intravenous Cricket Ureña RN    6/15/2025 1921 New Bag 0.5 mg/min Intravenous Nelly Loza RN          ampicillin-sulbactam (Unasyn) 3 g in sodium chloride 0.9% 100mL IVPB-EUGENE       Date Action Dose Route User    6/16/2025 0611 New Bag 3 g Intravenous Cricket Ureña RN    6/16/2025 0004 New Bag 3 g Intravenous Cricket Ureña RN    6/15/2025 1829 New Bag 3 g Intravenous Nelly Loza RN    6/15/2025 1245 New Bag 3 g Intravenous Nelly Loza RN          heparin (Porcine) 05774 units/250mL infusion ACS/AFIB CONTINUOUS       Date Action Dose Route User    6/16/2025 0614 New Bag 1,600 Units/hr Intravenous Cricket Ureña RN    6/15/2025 2303 Hi-Risk Rate/Dose Change 1,400 Units/hr Intravenous Cricket Ureña RN    6/15/2025 1445 Hi-Risk Rate/Dose Change 1,200 Units/hr Intravenous Nelly Loza RN          dexmedeTOMIDine in sodium chloride 0.9% (Precedex) 400 mcg/100mL infusion premix       Date Action Dose Route User    6/16/2025 0331 New Bag 0.3 mcg/kg/hr × 183 kg (Dosing Weight) Intravenous Cricket Ureña RN    6/15/2025 1907 Rate/Dose Change 0.3 mcg/kg/hr × 183 kg (Dosing Weight) Intravenous Nelly Loza, ELISSA    6/15/2025 5072 Rate/Dose Change 0.2  mcg/kg/hr × 183 kg (Dosing Weight) Intravenous Nelly Loza RN    6/15/2025 1652 Rate/Dose Change 0.3 mcg/kg/hr × 183 kg (Dosing Weight) Intravenous Nelly Loza RN    6/15/2025 1531 Rate/Dose Change 0.4 mcg/kg/hr × 183 kg (Dosing Weight) Intravenous Nelly Loza RN    6/15/2025 1348 New Bag 0.5 mcg/kg/hr × 183 kg (Dosing Weight) Intravenous Nelly Loza RN          fentaNYL in sodium chloride 0.9% (Sublimaze) 1000 mcg/100mL infusion premix       Date Action Dose Route User    6/16/2025 0026 New Bag 25 mcg/hr Intravenous Cricket Ureña RN    6/15/2025 1651 Hi-Risk Rate/Dose Change 25 mcg/hr Intravenous Nelly Loza RN          fentaNYL (Sublimaze) 50 mcg/mL injection 25 mcg       Date Action Dose Route User    6/15/2025 1530 Given 25 mcg Intravenous Nelly Loza RN          furosemide (Lasix) 10 mg/mL injection 80 mg       Date Action Dose Route User    6/15/2025 1828 Given 80 mg Intravenous Nelly Loza RN          furosemide (Lasix) 10 mg/mL injection 40 mg       Date Action Dose Route User    6/16/2025 1112 Given 40 mg Intravenous TAYLER'Arely Emmanuel RN          heparin (Porcine) 1000 UNIT/ML injection 3,000 Units       Date Action Dose Route User    6/15/2025 1639 Given 3,000 Units Intravenous Nelly Loza RN          heparin (Porcine) 1000 UNIT/ML injection 3,000 Units       Date Action Dose Route User    6/15/2025 2302 Given 3,000 Units Intravenous Cricket Ureña RN          heparin (Porcine) 1000 UNIT/ML injection 3,000 Units       Date Action Dose Route User    6/16/2025 0614 Given 3,000 Units Intravenous Cricket Ureña RN          hydrocortisone Na succinate PF (Solu-CORTEF) injection 50 mg       Date Action Dose Route User    6/16/2025 0611 Given 50 mg Intravenous Cricket Ureña RN    6/15/2025 2302 Given 50 mg Intravenous Cricket Ureña RN    6/15/2025 1829 Given 50 mg Intravenous Nelly Loza RN    6/15/2025 1251 Given 50 mg Intravenous Nelly Loza RN           insulin aspart (NovoLOG) 100 Units/mL FlexPen 2-10 Units       Date Action Dose Route User    6/16/2025 1154 Given 2 Units Subcutaneous (Left Upper Arm) Arely Young RN    6/16/2025 0612 Given 3 Units Subcutaneous (Right Upper Arm) Cricket Ureña RN    6/15/2025 2356 Given 3 Units Subcutaneous (Right Upper Arm) Cricket Ureña RN    6/15/2025 1824 Given 3 Units Subcutaneous (Right Upper Arm) Nelly Loza RN    6/15/2025 1252 Given 2 Units Subcutaneous (Left Upper Arm) Nelly Loza RN          norepinephrine (Levophed) 8 mg in dextrose 5% 250 mL infusion       Date Action Dose Route User    6/15/2025 1822 Rate/Dose Change 1 mcg/min Intravenous Nelly Loza RN    6/15/2025 1653 Rate/Dose Change 2 mcg/min Intravenous Nelly Loza RN    6/15/2025 1650 Rate/Dose Change 1 mcg/min Intravenous Nelly Loza RN    6/15/2025 1532 Rate/Dose Change 2 mcg/min Intravenous Nelly Loza RN          pantoprazole (Protonix) 40 mg in sodium chloride 0.9% PF 10 mL IV push       Date Action Dose Route User    6/15/2025 2012 Given 40 mg Intravenous Cricket Ureña RN            Vitals (last day)       Date/Time Temp Pulse Resp BP SpO2 Weight O2 Device O2 Flow Rate (L/min) [facility]    06/16/25 1200 -- 63 20 -- 96 % -- -- -- JO 06/16/25 1155 97.7 °F (36.5 °C) 64 26 -- 95 % -- -- -- JO 06/16/25 1100 -- 63 22 -- 94 % -- -- -- JO 06/16/25 1016 -- 66 24 -- 93 % -- -- -- AD    06/16/25 1003 -- -- -- -- 95 % -- High flow/High humidity 40 L/min     06/16/25 1000 -- 64 27 -- 95 % -- -- -- JO 06/16/25 0900 -- 64 16 -- 81 % -- -- -- JO 06/16/25 0825 -- -- -- -- 100 % -- High flow/High humidity 40 L/min     06/16/25 0800 98.8 °F (37.1 °C) 58 17 -- 95 % -- Ventilator -- ELIANE    06/16/25 0732 -- 56 19 -- 98 % -- -- --     06/16/25 0600 -- 55 21 -- 94 % -- -- --     06/16/25 0500 -- 54 20 -- 95 % -- -- --     06/16/25 0400 98.9 °F (37.2 °C) 53 20 -- 95 % 411 lb 9.6 oz (186.7 kg) Ventilator --      06/16/25 0300 -- 49 20 -- 97 % -- -- --     06/16/25 0200 -- 49 20 -- 98 % -- -- --     06/16/25 0100 99.1 °F (37.3 °C) 64 36 -- 96 % -- -- --     06/16/25 0000 99.1 °F (37.3 °C) 53 20 -- 97 % -- Ventilator --     06/15/25 2300 99.1 °F (37.3 °C) 53 20 -- 95 % -- -- --     06/15/25 2200 99.1 °F (37.3 °C) 55 20 -- 97 % -- -- --     06/15/25 2100 99.1 °F (37.3 °C) 54 20 -- 99 % -- -- --     06/15/25 2000 99 °F (37.2 °C) 55 20 -- 95 % -- Ventilator --     06/15/25 1948 -- 56 20 -- 96 % -- -- --     06/15/25 1900 99 °F (37.2 °C) 63 21 -- 94 % -- -- --     06/15/25 1815 99 °F (37.2 °C) 58 20 -- 95 % -- -- --     06/15/25 1800 99.1 °F (37.3 °C) 59 20 105/60 100 % -- -- --     06/15/25 1745 99.1 °F (37.3 °C) 61 20 -- 95 % -- -- --     06/15/25 1730 99.3 °F (37.4 °C) 63 20 -- 96 % -- -- --     06/15/25 1715 99.3 °F (37.4 °C) 60 20 -- 96 % -- -- --     06/15/25 1700 99.3 °F (37.4 °C) 59 20 -- 96 % -- -- --     06/15/25 1645 99.1 °F (37.3 °C) 59 20 -- 96 % -- -- --     06/15/25 1630 99.1 °F (37.3 °C) 59 20 -- 96 % -- -- -- KW    06/15/25 1615 99.1 °F (37.3 °C) 60 20 -- 96 % -- -- -- KW    06/15/25 1602 -- 59 20 -- 95 % -- -- -- DL    06/15/25 1601 -- 59 20 -- 95 % -- -- -- DL    06/15/25 1600 99.1 °F (37.3 °C) 59 20 100/63 96 % -- Ventilator -- KW    06/15/25 1545 -- 60 20 -- 95 % -- -- -- KW    06/15/25 1530 -- 60 20 -- 95 % -- -- -- KW    06/15/25 1529 -- 59 20 -- 95 % -- -- -- KW    06/15/25 1515 -- 60 19 -- 95 % -- -- -- KW    06/15/25 1509 -- 60 24 -- 95 % -- -- -- DL    06/15/25 1500 -- 61 24 -- 95 % -- -- -- KW    06/15/25 1445 98.8 °F (37.1 °C) 62 27 -- 95 % -- -- -- KW    06/15/25 1430 98.8 °F (37.1 °C) 62 24 -- 96 % -- -- -- KW    06/15/25 1415 98.6 °F (37 °C) 62 24 -- 96 % -- -- -- KW    06/15/25 1400 98.6 °F (37 °C) 63 24 97/42 96 % -- -- -- KW    06/15/25 1345 98.6 °F (37 °C) 65 24 -- 96 % -- -- -- KW    06/15/25 1330 98.4 °F (36.9 °C) 67 24 -- 98 % -- -- -- KW    06/15/25 1315  98.4 °F (36.9 °C) 72 24 -- 95 % -- -- -- KW    06/15/25 1300 98.4 °F (36.9 °C) 68 24 -- 95 % -- -- -- KW    06/15/25 1245 98.4 °F (36.9 °C) 69 24 -- 95 % -- -- -- KW    06/15/25 1230 98.4 °F (36.9 °C) 72 24 -- 95 % -- Ventilator -- KW    06/15/25 1215 98.2 °F (36.8 °C) 68 24 -- 94 % -- -- -- KW    06/15/25 1200 98.2 °F (36.8 °C) 70 24 100/52 95 % -- -- -- KW    06/15/25 1145 98.2 °F (36.8 °C) 71 24 -- 95 % -- -- -- KW    06/15/25 1130 98.2 °F (36.8 °C) 68 24 -- 95 % -- -- -- KW    06/15/25 1115 98.2 °F (36.8 °C) 68 21 -- 94 % -- -- -- KW    06/15/25 1100 98.2 °F (36.8 °C) 71 24 -- 96 % -- -- -- KW

## 2025-06-16 NOTE — PROCEDURES
LONG-TERM VIDEO EEG REPORT;    Reason for Examination: [facility]    Technical Summary:   18 Channels of EEG and 1 Channel of EKG was performed utilizing internation 10/20 method. Motion, muscle and machine artifacts were noted.       Recording Start date/time:  06/14/2025 at 1352  Recording end date/time:  06/15/2025 at 1214      Background Activity:   The background activity consisted of 6 Hz waveforms, reactive to eye opening/ external stimulation.    Abnormality:  Throughout the recording low to medium voltage, polymorphic, 2 to 4 Hz slow activity was noted diffusely over both hemispheres    Activation:    Hyperventilation:   Not Performed.    Photic Stimulation:  Driving response seen.No       Sleep:  Stage I sleep seen.     Impression:  This is a Abnormal prolonged Video EEG study. No focal, lateralized or generalized epileptiform activity seen.   Marked diffuse slowing into delta range was noted.  This constellation of findings can be seen in encephalopathy due to metabolic/toxic etiology, medication effects or diffuse cerebral injury.  Clinical correlation is recommended.        Bryan Brasher MD  [facility].

## 2025-06-16 NOTE — PROGRESS NOTES
[facility]  PHILIPP Neurology Progress Note    Josep Frost Patient Status:  Inpatient    1965 MRN [id number]   Location [facility] 4SW-A Attending Glendy Hayden MD   Hosp Day # 3 PCP NEELIMA JEFFERSON     SUBJECTIVE:  He is awake and interactive with his family today, and all are elated.    MEDICATIONS:  Prior to Admission Medications[1]  Current Hospital Medications[2]    PHYSICAL EXAMINATION:  VITAL SIGNS: /60   Pulse 56   Temp 98.8 °F (37.1 °C)   Resp 19   Ht 75\"   Wt (!) 411 lb 9.6 oz (186.7 kg)   SpO2 100%   BMI 51.45 kg/m²   GENERAL:  Patient is a 60 year old male in no acute distress.    NEUROLOGICAL:   Mental status: Oriented to person, place, and time  Speech & Language: Fluent, no dysarthria but has dysphonia  Comprehension: Intact to multi step commands  Cranial Nerves: VFF, PERRL, EOMI, no nystagmus, facial sensation intact, face symmetric, tongue midline, shoulder shrug equal  Motor: tone, bulk normal; strength is mildly weak in all flexors and extensors   Sensory: Intact to light touch in all limbs  Tendon Reflexes: normal for habitus, no asymmetry  Gait: Deferred    DIAGNOSTIC DATA:  Labs:  Recent Labs   Lab 25  0419 06/15/25  0159 06/15/25  0958 25  0408   RBC 5.07 4.38 4.08* 3.71*   HGB 15.6 13.7 12.5* 11.2*   HCT 45.6 38.8* 35.9* 32.9*   MCV 89.9 88.6 88.0 88.7   MCH 30.8 31.3 30.6 30.2   MCHC 34.2 35.3 34.8 34.0   RDW 13.2 13.5 13.6 13.6   NEPRELIM 29.48* 17.31*  --  8.95*   WBC 32.9* 21.9* 13.5* 10.7   .0 214.0 172.0 163.0         Recent Labs   Lab 06/15/25  0840 06/15/25  2208 25  0408   * 211* 243*   BUN 31* 37* 36*   CREATSERUM 1.44* 1.41* 1.40*   EGFRCR 56* 57* 58*   CA 8.7 9.0 8.7    136 137   K 4.3 4.0 3.9    99 99   CO2 28.0 30.0 30.0         IMAGING:  XR CHEST AP PORTABLE  (CPT=71045)  Result Date: 2025  CONCLUSION:  See above   LOCATION:  [facility]      Dictated by (CST): Marcelo Padron MD on 2025 at 6:09 AM      Finalized by (CST): Marcelo Padron MD on 6/16/2025 at 6:09 AM       XR CHEST AP PORTABLE  (CPT=71045)  Result Date: 6/15/2025  CONCLUSION:  Increased interstitial opacities most pronounced in the left lower lobe may represent edema versus infiltrate.   LOCATION:  [facility]      Dictated by (CST): Marcelo Parker MD on 6/15/2025 at 7:45 AM     Finalized by (CST): Marcelo Parker MD on 6/15/2025 at 7:47 AM       XR CHEST AP PORTABLE  (CPT=71045)  Addendum Date: 6/13/2025  ADDENDUM:  Right internal jugular central line has tip in the expected location the SVC.  No pneumothorax.  Dictated by (CST): Nilesh Eagle MD on 6/13/2025 at 10:57 PM     Finalized by (CST): Nilesh Eagle MD on 6/13/2025 at 10:58 PM             Result Date: 6/13/2025  CONCLUSION:   Endotracheal tube tip is 4.6 cm above the earl.  Enteric catheter is not well delineated on this exam.  Recommend repeat radiographs.   LOCATION:  [facility]      Dictated by (CST): Nilesh Eagle MD on 6/13/2025 at 10:22 PM     Finalized by (CST): Nilesh Eagle MD on 6/13/2025 at 10:22 PM       CTA CHEST (CPT=71275)  Result Date: 6/13/2025  CONCLUSION:   1. No evidence of pulmonary embolus.  2. No suspicious nodule, mass or consolidation.  Dependent reticular nodular densities in the lungs are noted.  3. The patient is intubated.  Enteric catheter traverses the esophagus into the stomach.  No pneumothorax.    LOCATION:  [facility]   Dictated by (CST): Nilesh Eagle MD on 6/13/2025 at 6:44 PM     Finalized by (CST): Nilesh Eagle MD on 6/13/2025 at 6:47 PM       CT BRAIN OR HEAD (CPT=70450)  Result Date: 6/13/2025  PROCEDURE:  CT BRAIN OR HEAD (26261)  COMPARISON:  None.  INDICATIONS:  Full Arrest  TECHNIQUE:  Noncontrast CT scanning is performed through the brain. Dose reduction techniques were used. Dose information is transmitted to the ACR (American College of Radiology) NRDR (National Radiology Data Registry) which includes the Dose Index Registry.   PATIENT STATED HISTORY: (As transcribed by Technologist)  Patient arrives after arresting during procedure, reaction to contrast dye.    FINDINGS: No evidence of hemorrhage or extra-axial fluid collection.  Supra and infratentorial brain parenchyma are unremarkable.  Ventricles and sulci are appropriate for the patient's age.  No mass effect.  Visualized portions of paranasal sinuses are unremarkable.  Visualized portions of mastoid air cells are unremarkable.  Visualized portions of orbits are unremarkable.  IMPRESSION: Unremarkable CT head.    LOCATION:  [facility]   Dictated by (CST): Nilesh Eagle MD on 2025 at 6:42 PM     Finalized by (CST): Nilesh Eagle MD on 2025 at 6:43 PM       XR CHEST AP PORTABLE  (CPT=71045)  Result Date: 2025  CONCLUSION:   Markedly enlarged cardiac silhouette is noted.  This may be due to the heart itself.  Possibility of pericardial effusion cannot be excluded.  Pulmonary vascular congestion is noted.  No focal consolidation.  A large pneumothorax is not identified.   LOCATION:  [facility]      Dictated by (CST): Nilesh Eagle MD on 2025 at 6:13 PM     Finalized by (CST): Nilesh Eagle MD on 2025 at 6:16 PM       CARD ECHO LIMITED (CPT=93308/45522/02704)  Result Date: 2025  Transthoracic Echocardiogram Name:Josep Frost Date: 2025 :  1965 Ht:  (75in)  BP: 107 / 81 MRN:  [id number]    Age:  60years    Wt:  (380lb) HR: 96bpm Loc:  [facility]       Gndr: M          BSA: 2.88m^2 Sonographer: Phuong BAKER Ordering:    Baljinder Fu Consulting:  Jovanny Figueroa ---------------------------------------------------------------------------- History/Indications:  Full arrest. ---------------------------------------------------------------------------- Procedure information:  A transthoracic echocardiogram, limited study was performed. Additional evaluation included limited 2D.  Patient status: Inpatient.  Location:  Room C1.    This was a STAT  study. Transthoracic echocardiography for diagnosis. Image quality was suboptimal. The study was technically limited due to poor acoustic window availability and patient supine. ECG rhythm:   Normal sinus ---------------------------------------------------------------------------- Conclusions: 1. Procedure narrative: Image quality was suboptimal. The study was    technically limited due to poor acoustic window availability and patient    supine. 2. Left ventricle: The cavity size was normal. Wall thickness was increased.    Systolic function was normal. The estimated ejection fraction was 55-60%,    by visual assessment. 3. Right ventricle: The cavity size was at the upper limits of normal.    Systolic function was normal. 4. Left atrium: The left atrial volume was normal. Impressions:  No previous study from [facility] was available for comparison. * ---------------------------------------------------------------------------- * Findings: Left ventricle:  The cavity size was normal. Wall thickness was increased. Systolic function was normal. The estimated ejection fraction was 55-60%, by visual assessment. Left atrium:  The atrium was normal in size. The left atrial volume was normal. Right ventricle:  The cavity size was at the upper limits of normal. Systolic function was normal. Right atrium:  The atrium was normal in size. Mitral valve:  Not well visualized. Aortic valve:  Not well visualized. Tricuspid valve:  Not well visualized. Pulmonic valve:   Not visualized. Pericardium:  A possible, trivial pericardial effusion was identified. Pulmonary arteries: Not visualized. Systemic veins:  Central venous respirophasic diameter changes are in the normal range (>50%). Inferior vena cava: The IVC was normal-sized. ---------------------------------------------------------------------------- Measurements  Left ventricle                  Value        Ref  IVS thickness, ED, PLAX     (H) 1.1   cm     0.6 -  1.0  LV ID, ED, PLAX                 4.4   cm     4.2 - 5.8  LV ID, ES, PLAX                 2.9   cm     2.5 - 4.0  LV PW thickness, ED, PLAX   (H) 1.1   cm     0.6 - 1.0  IVS/LV PW ratio, ED, PLAX       0.97         ---------  LV PW/LV ID ratio, ED, PLAX     0.26         ---------  LV ejection fraction            64    %      52 - 72  Left atrium                     Value        Ref  LA ID, A-P, ES                  4.0   cm     3.0 - 4.0  LA volume, S                (H) 59    ml     18 - 58  LA volume/bsa, S                20    ml/m^2 16 - 34  LA volume, ES, 1-p A4C          56    ml     18 - 58  LA volume, ES, 1-p A2C      (H) 59    ml     18 - 58  LA volume, ES, A/L              61    ml     ---------  LA volume/bsa, ES, A/L          21    ml/m^2 16 - 34 Legend: (L)  and  (H)  camille values outside specified reference range. ---------------------------------------------------------------------------- Prepared and electronically signed by Ronny Fuchs 06/13/2025 17:57       ASSESSMENT:  Mr. Frost is a 60-year-old man with obesity and atrial fibrillation who experienced witnessed cardiac arrest, likely secondary to Definity contrast reaction, recovering well and no serious anoxic encephalopathy noted thus far.    PLAN:  Principal Problem:    Cardiac arrest (HCC)  Active Problems:    NSTEMI (non-ST elevated myocardial infarction) (HCC)    Atrial fibrillation (HCC)    Morbid obesity (HCC)    ROSEMARY (acute kidney injury)    Acute respiratory failure with hypoxia (HCC)    We discussed how he may have some \"short term memory\" issues in the coming days to weeks, as this is very common to the point of being essentially expected, but that often improve over weeks to months with good supportive care. He can follow up with neurology when out of the hospital to check on this and to see if neuropsychometrics might be needed if there are persistent problems.    Otherwise, he needs quality and regularity of sleep, meals, hydration  and physical activity as well as day/night cycle integrity. He will need PT/OT/ST/rehab evaluation as well.    No further urgent neurological diagnostics or therapeutics indicated. That being the case neurology will sign off but call back should concerns arise.    Duncan Hernandez MD       [1]   No current outpatient medications on file.   [2]   Current Facility-Administered Medications   Medication Dose Route Frequency    hydrocortisone Na succinate PF (Solu-CORTEF) injection 50 mg  50 mg Intravenous Q12H    Followed by    [START ON 6/17/2025] hydrocortisone Na succinate PF (Solu-CORTEF) injection 25 mg  25 mg Intravenous q12h    amiodarone in dextrose 4.14% (Cordarone) 360 mg/200mL infusion premix  0.5 mg/min Intravenous Continuous    heparin (Porcine) 83095 units/250mL infusion ACS/AFIB CONTINUOUS  200-3,000 Units/hr Intravenous Continuous    ampicillin-sulbactam (Unasyn) 3 g in sodium chloride 0.9% 100mL IVPB-EUGENE  3 g Intravenous Q6H    dexmedeTOMIDine in sodium chloride 0.9% (Precedex) 400 mcg/100mL infusion premix  0.2-1.5 mcg/kg/hr (Dosing Weight) Intravenous Continuous    dextrose 10% infusion (TPN no rate)   Intravenous Continuous PRN    sodium bicarbonate tab 650 mg  650 mg Per G Tube PRN    And    pancrelipase (Lip-Prot-Amyl) (Zenpep) DR particles cap 10,000 Units  10,000 Units Per G Tube PRN    glucose (Dex4) 15 GM/59ML oral liquid 15 g  15 g Oral Q15 Min PRN    Or    glucose (Glutose) 40% oral gel 15 g  15 g Oral Q15 Min PRN    Or    glucose-vitamin C (Dex-4) chewable tab 4 tablet  4 tablet Oral Q15 Min PRN    Or    dextrose 50% injection 50 mL  50 mL Intravenous Q15 Min PRN    Or    glucose (Dex4) 15 GM/59ML oral liquid 30 g  30 g Oral Q15 Min PRN    Or    glucose (Glutose) 40% oral gel 30 g  30 g Oral Q15 Min PRN    Or    glucose-vitamin C (Dex-4) chewable tab 8 tablet  8 tablet Oral Q15 Min PRN    vasopressin (Vasostrict) 20 Units in sodium chloride 0.9% 100 mL infusion for septic shock  0.01-0.03  Units/min Intravenous Continuous    norepinephrine (Levophed) 8 mg in dextrose 5% 250 mL infusion  0.5-50 mcg/min Intravenous Continuous    insulin aspart (NovoLOG) 100 Units/mL FlexPen 2-10 Units  2-10 Units Subcutaneous 4 times per day    fentaNYL in sodium chloride 0.9% (Sublimaze) 1000 mcg/100mL infusion premix   mcg/hr Intravenous Continuous    propofol (Diprivan) 10 mg/mL infusion premix  5-50 mcg/kg/min Intravenous Continuous    acetaminophen (Tylenol Extra Strength) tab 1,000 mg  1,000 mg Oral Q8H PRN    melatonin tab 3 mg  3 mg Oral Nightly PRN    polyethylene glycol (PEG 3350) (Miralax) 17 g oral packet 17 g  17 g Oral Daily PRN    sennosides (Senokot) tab 17.2 mg  17.2 mg Oral Nightly PRN    bisacodyl (Dulcolax) 10 MG rectal suppository 10 mg  10 mg Rectal Daily PRN    ondansetron (Zofran) 4 MG/2ML injection 4 mg  4 mg Intravenous Q6H PRN    prochlorperazine (Compazine) 10 MG/2ML injection 5 mg  5 mg Intravenous Q8H PRN    benzonatate (Tessalon) cap 200 mg  200 mg Oral TID PRN    guaiFENesin (Robitussin) 100 MG/5 ML oral liquid 200 mg  200 mg Oral Q4H PRN    glycerin-hypromellose- (Artificial Tears) 0.2-0.2-1 % ophthalmic solution 1 drop  1 drop Both Eyes QID PRN    sodium chloride (Saline Mist) 0.65 % nasal solution 1 spray  1 spray Each Nare Q3H PRN    sodium chloride 0.9 % IV bolus 1,000 mL  1,000 mL Intravenous Once    pantoprazole (Protonix) 40 mg in sodium chloride 0.9% PF 10 mL IV push  40 mg Intravenous Q24H    fentaNYL (Sublimaze) 50 mcg/mL injection 25 mcg  25 mcg Intravenous Q1H PRN

## 2025-06-16 NOTE — PROGRESS NOTES
[facility]   part of [facility]     Hospitalist Progress Note     Josep Frost Patient Status:  Inpatient    1965 MRN [id number]   Location [facility] 4SW-A Attending Jumana Doe MD   Hosp Day # 3 PCP NEELIMA JEFFERSON     Chief Complaint: cardiac arrest    Subjective:     Patient is extubated awake    Objective:    Review of Systems:   Unable to obtain    Vital signs:  Temp:  [97.7 °F (36.5 °C)-99.3 °F (37.4 °C)] 97.7 °F (36.5 °C)  Pulse:  [49-72] 63  Resp:  [16-36] 20  BP: ()/(42-63) 105/60  SpO2:  [81 %-100 %] 96 %  AO: ()/(53-79) 144/63  FiO2 (%):  [30 %-40 %] 40 %    Physical Exam:    General: stable  Respiratory: No wheezes, no rhonchi  Cardiovascular: S1, S2, regular rate and rhythm  Abdomen: Soft, Non-tender, non-distended, positive bowel sounds  Neuro: No new focal deficits.   Extremities: No edema      Diagnostic Data:    Labs:  Recent Labs   Lab 25  1656 25  0419 06/15/25  0159 06/15/25  0958 25  0408   WBC 23.9* 32.9* 21.9* 13.5* 10.7   HGB 15.6 15.6 13.7 12.5* 11.2*   MCV 93.1 89.9 88.6 88.0 88.7   .0 349.0 214.0 172.0 163.0   BAND 8 11  --   --   --    INR 1.41* 1.23* 1.27*  --  1.09       Recent Labs   Lab 25  0419 25  1444 06/15/25  0159 06/15/25  0840 06/15/25  2208 25  0408   *   < > 201* 187* 211* 243*   BUN 26*   < > 30* 31* 37* 36*   CREATSERUM 2.18*   < > 1.56* 1.44* 1.41* 1.40*   CA 8.4*   < > 8.7 8.7 9.0 8.7   ALB 4.1  --  3.9  --   --  3.6      < > 136 136 136 137   K 4.0   < > 4.7 4.3 4.0 3.9      < > 100 100 99 99   CO2 20.0*   < > 26.0 28.0 30.0 30.0   ALKPHO 105  --  86  --   --  66   AST 73*  --  47*  --   --  31   ALT 44  --  33  --   --  25   BILT 0.5  --  0.8  --   --  0.4   TP 6.4  --  6.2  --   --  5.8    < > = values in this interval not displayed.       Estimated Glomerular Filtration Rate: 58 mL/min/1.73m2 (A) (result from lab).    Recent Labs   Lab 25  4586 25  06/14/25  0419   TROPHS 105* 1,120* 786*       Recent Labs   Lab 06/14/25  0419 06/15/25  0159 06/16/25  0408   PTP 15.6* 16.1* 14.2   INR 1.23* 1.27* 1.09                    Microbiology    Hospital Encounter on 06/13/25   1. Blood Culture     Status: None (Preliminary result)    Collection Time: 06/14/25  2:59 PM    Specimen: Blood,peripheral   Result Value Ref Range    Blood Culture Result No Growth 1 Day N/A         Imaging: Reviewed in Epic.    Medications: Scheduled Medications[1]    Assessment & Plan:      # PEA arrest    - thought 2/2 definity contrast reaction    - on Epi, leovphed ggt   - EKG on admission with Afib with RBBB (seen on prior)   - TTE with limited views, though preserved EF, no tamponade physiology or significant valvular abnormalities     - CTA negative for PE   - CTH unremarkable   - Empiric IV abx         # Afib       - s/p successful cardioversion 5/13/2025   - Cardiology following     # NSTEMI, likely type II   - continue trending trops    - cardiology following     # Lactic acidosis - due to above, slightly improved  # ROSEMARY - likely 2/2 arrest, up trending     # Morbid obesity, BMI 50    #PreDM, A1c 6.0    # Leukocytosis, WBC higher  On empiric abx  Improved         Supplementary Documentation:     Quality:  DVT Mechanical Prophylaxis:   SCDs, Early ambuation  DVT Pharmacologic Prophylaxis   Medication    heparin (Porcine) 30341 units/250mL infusion ACS/AFIB CONTINUOUS                Code Status: DNAR/Full Treatment  Tinajero: Tinajero catheter in place  Tinajero Duration (in days): 2  Central line: central venous catheter in place  DERIC:     Discharge is dependent on: progress  At this point Mr. Frost is expected to be discharge to: tbd    The 21st Century Cures Act makes medical notes like these available to patients in the interest of transparency. Please be advised this is a medical document. Medical documents are intended to carry relevant information, facts as evident, and the clinical  opinion of the practitioner. The medical note is intended as peer to peer communication and may appear blunt or direct. It is written in medical language and may contain abbreviations or verbiage that are unfamiliar.                         [1]    hydrocortisone Na succinate PF  50 mg Intravenous Q12H    Followed by    [START ON 6/17/2025] hydrocortisone sodium succinate  25 mg Intravenous q12h    furosemide  40 mg Intravenous BID (Diuretic)    ampicillin-sulbactam  3 g Intravenous Q6H    insulin aspart  2-10 Units Subcutaneous 4 times per day    sodium chloride  1,000 mL Intravenous Once    pantoprazole  40 mg Intravenous Q24H

## 2025-06-16 NOTE — PROGRESS NOTES
[facility]  Progress Note    oJsep Frost Patient Status:  Inpatient    1965 MRN [id number]   Location [facility] 4SW-A Attending Glendy Hayden MD   Hosp Day # 3 PCP NEELIMA JEFFERSON     Subjective:  Josep Frost is a(n) 60 year old male low-grade fevers seem to be trending down afebrile this a.m.  Extubated comfortable verbal seems connected now on nasal cannula    Objective:  /60   Pulse 63   Temp 97.7 °F (36.5 °C) (Temporal)   Resp 20   Ht 6' 3\" (1.905 m)   Wt (!) 411 lb 9.6 oz (186.7 kg)   SpO2 96%   BMI 51.45 kg/m² nasal cannula      Temp (24hrs), Av °F (37.2 °C), Min:97.7 °F (36.5 °C), Max:99.3 °F (37.4 °C)      Intake/Output:    Intake/Output Summary (Last 24 hours) at 2025 1527  Last data filed at 2025 1157  Gross per 24 hour   Intake 2710.45 ml   Output 3305 ml   Net -594.55 ml       Physical Exam:   General: alert, cooperative, oriented.  No respiratory distress.   Head: Normocephalic, without obvious abnormality, atraumatic.   Throat: Lips, mucosa, and tongue normal.  No thrush noted.  Small area   Neck: trachea midline, no adenopathy, no thyromegaly. No JVD.   Lungs: No auscultated wheeze bibasilar rales   Chest wall: No tenderness or deformity.   Heart: Murmurs noted rate control   Abdomen: soft, non-distended, no masses, no guarding, no     Rebound.  Obese protuberant   Extremity: Edema bilateral   Skin: No rashes or lesions.   Neurological: Alert, interactive, no focal deficits    Lab Data Review:  Recent Labs     25  1656 25  0419 06/15/25  0159 06/15/25  0958 25  0408   WBC 23.9* 32.9* 21.9* 13.5* 10.7   HGB 15.6 15.6 13.7 12.5* 11.2*   .0 349.0 214.0 172.0 163.0     Recent Labs     25  1444 06/15/25  0159 06/15/25  0840 06/15/25  2208 25  0408    136 136 136 137   K 4.7 4.7 4.3 4.0 3.9   CL 99 100 100 99 99   CO2 26.0 26.0 28.0 30.0 30.0   BUN 30* 30* 31* 37* 36*   CREATSERUM 1.84* 1.56* 1.44* 1.41* 1.40*      Recent Labs   Lab 06/14/25  0419 06/15/25  0159 06/15/25  0840 06/15/25  2208 06/16/25  0408 06/16/25  1153   PTP 15.6* 16.1*  --   --  14.2  --    INR 1.23* 1.27*  --   --  1.09  --    PTT 24.0  --    < > 33.5 36.2* 32.6    < > = values in this interval not displayed.       Cultures: Blood cultures remain negative  Sputum cultures normal respiratory carmen    Radiology:  XR CHEST AP PORTABLE  (CPT=71045)  Result Date: 6/16/2025  CONCLUSION:  See above   LOCATION:  [facility]      Dictated by (CST): Marcelo Padron MD on 6/16/2025 at 6:09 AM     Finalized by (CST): Marcelo Padron MD on 6/16/2025 at 6:09 AM       Reviewed enlarged cardiac silhouette no evidence of significant pleural effusion or focal consolidation increased pulmonary vascular redistribution and slight basilar increased atelectasis suspected  CT reviewed      Medications reviewed     Assessment and Plan:   Problem List[1]    Assessment:  Acute hypoxic/hypercapnic respiratory failure/mechanical ventilation-now extubated to nasal cannula  Status post PEA arrest --possible anaphylaxis to Definity --completed targeted temperature protocol now awake and alert and hemodynamically stable  Suspected acute on chronic hypercapnic respiratory failure-bicarb in February-compatible with EL/OHS-reportedly wears CPAP at home  Atrial fibrillation with RVR history of ablation: Rate controlled  Non-STEMI type II demand ischemia  Severe obesity  ROSEMARY-resolving  Pulmonary infiltrate possible aspiration pneumonia plans to complete course        Plan:  Wean FiO2 to keep oxygen saturation between 90% 92%  Following serial ABGs  Continue use Unasyn 3 g IV every 6 hours  Hydrocortisone 50 mg to be weaned  Resume nocturnal CPAP follow-up for need to accelerate  Reviewed with patient's wife and brother at bedside      CC     Rabia Pierre MD  6/16/2025  3:27 PM         [1]   Patient Active Problem List  Diagnosis    Cardiac arrest (HCC)    NSTEMI (non-ST elevated myocardial  infarction) (HCC)    Atrial fibrillation (HCC)    Morbid obesity (HCC)    ROSEMARY (acute kidney injury)    Acute respiratory failure with hypoxia (HCC)

## 2025-06-16 NOTE — PLAN OF CARE
Pt opens eyes to voice and follows all commands. Able to nod/shake head yes/no. Sinus gal with BP within normal limits. Afebrile. Adequate urine output. Tolerating TF. Plan for SBT in AM.  Will continue to monitor.        Problem: Safety Risk - Non-Violent Restraints  Goal: Patient will remain free from self-harm  Description: INTERVENTIONS:  - Apply the least restrictive restraint to prevent harm  - Notify patient and family of reasons restraints applied  - Assess for any contributing factors to confusion (electrolyte disturbances, delirium, medications)  - Discontinue any unnecessary medical devices as soon as possible  - Assess the patient's physical comfort, circulation, skin condition, hydration, nutrition and elimination needs   - Reorient and redirection as needed  - Assess for the need to continue restraints  Outcome: Progressing     Problem: CARDIOVASCULAR - ADULT  Goal: Maintains optimal cardiac output and hemodynamic stability  Description: INTERVENTIONS:  - Monitor vital signs, rhythm, and trends  - Monitor for bleeding, hypotension and signs of decreased cardiac output  - Evaluate effectiveness of vasoactive medications to optimize hemodynamic stability  - Monitor arterial and/or venous puncture sites for bleeding and/or hematoma  - Assess quality of pulses, skin color and temperature  - Assess for signs of decreased coronary artery perfusion - ex. Angina  - Evaluate fluid balance, assess for edema, trend weights  Outcome: Progressing  Goal: Absence of cardiac arrhythmias or at baseline  Description: INTERVENTIONS:  - Continuous cardiac monitoring, monitor vital signs, obtain 12 lead EKG if indicated  - Evaluate effectiveness of antiarrhythmic and heart rate control medications as ordered  - Initiate emergency measures for life threatening arrhythmias  - Monitor electrolytes and administer replacement therapy as ordered  Outcome: Progressing     Problem: RESPIRATORY - ADULT  Goal: Achieves optimal  ventilation and oxygenation  Description: INTERVENTIONS:  - Assess for changes in respiratory status  - Assess for changes in mentation and behavior  - Position to facilitate oxygenation and minimize respiratory effort  - Oxygen supplementation based on oxygen saturation or ABGs  - Provide Smoking Cessation handout, if applicable  - Encourage broncho-pulmonary hygiene including cough, deep breathe, Incentive Spirometry  - Assess the need for suctioning and perform as needed  - Assess and instruct to report SOB or any respiratory difficulty  - Respiratory Therapy support as indicated  - Manage/alleviate anxiety  - Monitor for signs/symptoms of CO2 retention  Outcome: Progressing     Problem: GASTROINTESTINAL - ADULT  Goal: Minimal or absence of nausea and vomiting  Description: INTERVENTIONS:  - Maintain adequate hydration with IV or PO as ordered and tolerated  - Nasogastric tube to low intermittent suction as ordered  - Evaluate effectiveness of ordered antiemetic medications  - Provide nonpharmacologic comfort measures as appropriate  - Advance diet as tolerated, if ordered  - Obtain nutritional consult as needed  - Evaluate fluid balance  Outcome: Progressing  Goal: Maintains or returns to baseline bowel function  Description: INTERVENTIONS:  - Assess bowel function  - Maintain adequate hydration with IV or PO as ordered and tolerated  - Evaluate effectiveness of GI medications  - Encourage mobilization and activity  - Obtain nutritional consult as needed  - Establish a toileting routine/schedule  - Consider collaborating with pharmacy to review patient's medication profile  Outcome: Not Progressing     Problem: METABOLIC/FLUID AND ELECTROLYTES - ADULT  Goal: Glucose maintained within prescribed range  Description: INTERVENTIONS:  - Monitor Blood Glucose as ordered  - Assess for signs and symptoms of hyperglycemia and hypoglycemia  - Administer ordered medications to maintain glucose within target range  - Assess  barriers to adequate nutritional intake and initiate nutrition consult as needed  - Instruct patient on self management of diabetes  Outcome: Progressing  Goal: Electrolytes maintained within normal limits  Description: INTERVENTIONS:  - Monitor labs and rhythm and assess patient for signs and symptoms of electrolyte imbalances  - Administer electrolyte replacement as ordered  - Monitor response to electrolyte replacements, including rhythm and repeat lab results as appropriate  - Fluid restriction as ordered  - Instruct patient on fluid and nutrition restrictions as appropriate  Outcome: Progressing  Goal: Hemodynamic stability and optimal renal function maintained  Description: INTERVENTIONS:  - Monitor labs and assess for signs and symptoms of volume excess or deficit  - Monitor intake, output and patient weight  - Monitor urine specific gravity, serum osmolarity and serum sodium as indicated or ordered  - Monitor response to interventions for patient's volume status, including labs, urine output, blood pressure (other measures as available)  - Encourage oral intake as appropriate  - Instruct patient on fluid and nutrition restrictions as appropriate  Outcome: Progressing     Problem: SKIN/TISSUE INTEGRITY - ADULT  Goal: Skin integrity remains intact  Description: INTERVENTIONS  - Assess and document risk factors for pressure ulcer development  - Assess and document skin integrity  - Monitor for areas of redness and/or skin breakdown  - Initiate interventions, skin care algorithm/standards of care as needed  Outcome: Progressing  Goal: Oral mucous membranes remain intact  Description: INTERVENTIONS  - Assess oral mucosa and hygiene practices  - Implement preventative oral hygiene regimen  - Implement oral medicated treatments as ordered  Outcome: Progressing     Problem: NEUROLOGICAL - ADULT  Goal: Achieves stable or improved neurological status  Description: INTERVENTIONS  - Assess for and report changes in  neurological status  - Initiate measures to prevent increased intracranial pressure  - Maintain blood pressure and fluid volume within ordered parameters to optimize cerebral perfusion and minimize risk of hemorrhage  - Monitor temperature, glucose, and sodium. Initiate appropriate interventions as ordered  Outcome: Progressing  Goal: Absence of seizures  Description: INTERVENTIONS  - Monitor for seizure activity  - Administer anti-seizure medications as ordered  - Monitor neurological status  Outcome: Progressing  Goal: Remains free of injury related to seizure activity  Description: INTERVENTIONS:  - Maintain airway, patient safety  and administer oxygen as ordered  - Monitor patient for seizure activity, document and report duration and description of seizure to MD/LIP  - If seizure occurs, turn patient to side and suction secretions as needed  - Reorient patient post seizure  - Seizure pads on all 4 side rails  - Instruct patient/family to notify RN of any seizure activity  - Instruct patient/family to call for assistance with activity based on assessment  Outcome: Progressing  Goal: Achieves maximal functionality and self care  Description: INTERVENTIONS  - Monitor swallowing and airway patency with patient fatigue and changes in neurological status  - Encourage and assist patient to increase activity and self care with guidance from PT/OT  - Encourage visually impaired, hearing impaired and aphasic patients to use assistive/communication devices  Outcome: Progressing     Problem: Impaired Functional Mobility  Goal: Achieve highest/safest level of mobility/gait  Description: Interventions:  - Assess patient's functional ability and stability  - Promote increasing activity/tolerance for mobility and gait  - Educate and engage patient/family in tolerated activity level and precautions    Outcome: Not Progressing     Problem: Impaired Cognition  Goal: Patient will exhibit improved attention, thought processing  and/or memory  Description: Interventions:    Outcome: Progressing     Problem: Diabetes/Glucose Control  Goal: Glucose maintained within prescribed range  Description: INTERVENTIONS:  - Monitor Blood Glucose as ordered  - Assess for signs and symptoms of hyperglycemia and hypoglycemia  - Administer ordered medications to maintain glucose within target range  - Assess barriers to adequate nutritional intake and initiate nutrition consult as needed  - Instruct patient on self management of diabetes  Outcome: Progressing

## 2025-06-16 NOTE — PROGRESS NOTES
[facility] Cardiology Progress Note    Josep Frost Patient Status:  Inpatient    1965 MRN [id number]   Location [facility] 4SW-A Attending Glendy Hayden MD   Hosp Day # 3 PCP NEELIMA JEFFERSON     Subjective:  Extubated, awake and alert and orietned x3  Chest pain when he coughs  Happy to be alive.     Objective:  /60   Pulse 56   Temp 98.8 °F (37.1 °C)   Resp 19   Ht 6' 3\" (1.905 m)   Wt (!) 411 lb 9.6 oz (186.7 kg)   SpO2 100%   BMI 51.45 kg/m²     Telemetry: nsr      Intake/Output:    Intake/Output Summary (Last 24 hours) at 2025 0934  Last data filed at 2025 0815  Gross per 24 hour   Intake 2949.1 ml   Output 2330 ml   Net 619.1 ml       Last 3 Weights   25 0400 (!) 411 lb 9.6 oz (186.7 kg)   06/15/25 0400 (!) 405 lb 10.3 oz (184 kg)   25 2045 (!) 403 lb 7.1 oz (183 kg)   25 1512 (!) 380 lb (172.4 kg)   25 1207 (!) 380 lb (172.4 kg)       Labs:  Recent Labs   Lab 06/15/25  0840 06/15/25  2208 25  0408   * 211* 243*   BUN 31* 37* 36*   CREATSERUM 1.44* 1.41* 1.40*   EGFRCR 56* 57* 58*   CA 8.7 9.0 8.7    136 137   K 4.3 4.0 3.9    99 99   CO2 28.0 30.0 30.0     Recent Labs   Lab 25  0419 06/15/25  0159 06/15/25  0958 25  0408   RBC 5.07 4.38 4.08* 3.71*   HGB 15.6 13.7 12.5* 11.2*   HCT 45.6 38.8* 35.9* 32.9*   MCV 89.9 88.6 88.0 88.7   MCH 30.8 31.3 30.6 30.2   MCHC 34.2 35.3 34.8 34.0   RDW 13.2 13.5 13.6 13.6   NEPRELIM 29.48* 17.31*  --  8.95*   WBC 32.9* 21.9* 13.5* 10.7   .0 214.0 172.0 163.0         Recent Labs   Lab 25  1656 25  2056 25  0419   TROPHS 105* 1,120* 786*       Diagnostics:       Conclusions:     1. Procedure narrative: Image quality was suboptimal. The study was      technically limited due to poor acoustic window availability and patient      supine.   2. Left ventricle: The cavity size was normal. Wall thickness was increased.      Systolic function was  normal. The estimated ejection fraction was 55-60%,      by visual assessment.   3. Right ventricle: The cavity size was at the upper limits of normal.      Systolic function was normal.   4. Left atrium: The left atrial volume was normal.   Impressions:  No previous study from [facility] was   available for comparison.   *       Physical Exam:    Physical Exam  Cardiovascular:      Rate and Rhythm: Normal rate and regular rhythm.      Heart sounds: No murmur heard.     No friction rub.   Pulmonary:      Effort: Pulmonary effort is normal.   Abdominal:      General: There is distension.      Comments: anasarcic   Musculoskeletal:      Right lower leg: Edema present.      Left lower leg: Edema present.   Neurological:      Mental Status: He is alert and oriented to person, place, and time.         Medications:  Scheduled Medications[1]  Medication Infusions[2]    Assessment:  59yo presented to [facility] office for an Echo, soon after receiving Definity became SOB,hypoxic, and then lost his pulse. Ryhtm initially PEA reportedly, then asystolic. ROSC after 10min of cpr    Cardiopulmonary arrest, PEA then Asystole  Extubated, without anoxic encephalopathy  ? R/t Anaphylaxis s/p Definity, gettting steroids   Echo with EF 60%, no WMA, no sig valvular issues   Type II MI  Demand in setting of arrest    Volume overload  Iatrogenic role, +5.8L with IV administration  Cvp 19 yesterday while intubated     Paroxysmal Afib, flutter, SSS  Onset AF rvr yesterday, not back in SR on Amiodarone loading  Was on Flecainide prior to admission  Recurrent s/p ablation prior to admission  [id number]= tbd, was on Xarelto prior to admission     Morbid Obesity  EL-compliant with cpap prior to admission   ROSEMARY  Cr improving with diuresis    Plan:    Lasix 40mg iv bid, as reviewed with critical care  Follow cvp  Recommend Resume anticoagulation for afib    Nichol Barnhart, OLIMPIA  6/16/2025  9:34 AM  Ph [phone]  ([place])  Ph [phone] ([place])        Patient seen and examined independently.  Note reviewed and labs reviewed.  Agree to the assessment and plan with the following additions/changes.  Entire medical decision making & plan performed by myself.    General: NAD.  HEENT: Normocephalic.  Neck: Supple.  Cardiac: RRR. Normal S1, S2 . No murmur.  Lungs: Diminished.  Extremities: No edema.    A/P:  Cardiopulmonary arrest secondary to hypoxia  Suspected Anaphylactic reaction following Definity injection  PAF, Morbid obesity  Today extubated and appropriate which is very encouraging  IV lasix, resume AC for Afib    LOC L2    Baljinder Fu MD  6/16/2025  Interventional Cardiology  [facility]  =======================================================         [1]    hydrocortisone Na succinate PF  50 mg Intravenous Q12H    Followed by    [START ON 6/17/2025] hydrocortisone sodium succinate  25 mg Intravenous q12h    ampicillin-sulbactam  3 g Intravenous Q6H    insulin aspart  2-10 Units Subcutaneous 4 times per day    sodium chloride  1,000 mL Intravenous Once    pantoprazole  40 mg Intravenous Q24H   [2]    amiodarone 0.5 mg/min (06/16/25 0702)    continuous dose heparin 1,600 Units/hr (06/16/25 0614)    dexmedetomidine Stopped (06/16/25 0815)    dextrose 10%      vasopressin (Vasostrict) 20 Units in sodium chloride 0.9% 100 mL infusion for septic shock Stopped (06/15/25 0636)    norepinephrine Stopped (06/16/25 0000)    fentanyl Stopped (06/16/25 0531)    propofol Stopped (06/15/25 1055)

## 2025-06-16 NOTE — RESPIRATORY THERAPY NOTE
Current Mechanical Ventilator Settings:  - Mode: Acvc+  - Rate: 20  - Tidal Volume(mL):550  - FiO2: 40%  - PEEP  +8  - inspiratory time 0.9    Breath Sounds: dim    ETT Size: 8.0    Measured Parameters:  Peak Inspiratory Pressure(cmH2O): 28  Plateu Pressure(cmH2O) : 24  Tidal Volume(mL): 579  Rate: 20    Shift Events noted: Received pt on full support. No changes made overnight. Routine care given.

## 2025-06-16 NOTE — PROGRESS NOTES
CRITICAL CARE PROGRESS NOTE    Patient Name: Josep Frost  : 1965  MRN: [id number]  Admit Date: 2025  Length of Stay: 3 Days    Subjective/24h events: 3 of Precedex this morning off vasopressors still on PEEP of 8  SBT spoke to RT already responded to 80 of IV Lasix last night with approximately 2 L    Assessment and Plan: 60-year-old male status post cardiac arrest possible anaphylactic reaction    Neuro/Psych: Anoxic encephalopathy status post targeted temperature management now appears to be very appropriate neurologically  Neurology following    Cardiovascular: Cardiac arrest presumably secondary to anaphylactic reaction in the setting of likely pickwickian/EL causing hypoxia and then cardiac arrest versus air embolism??  Echo reviewed fairly benign  No further shock  Off vasopressors      Known history of A-fib with ablation on amiodarone some bradycardia but appears to be temporally related to Precedex which will be taken off so continue amiodarone for now cardiology following  On heparin drip    Pulmonary: Hypoxemic respiratory failure now on minimal vent settings positive and respiratory pressure at 8 given his body habitus would do pressure support trial of this and then extubate to equivalent with positive pressure on Vapotherm    GI: No ischemic hepatopathy that is pertinent  Continue GI prophylaxis  Potential swallow study was on tube feeds    Renal/Metabolic: Acute kidney injury improving status post Lasix 80 mg last night and adequate diuresis  Will give 1 more dose this morning 40 x 1    Heme: No significant anemia thrombocytopenia or coagulopathy    ID: Leukocytosis resolved culture data unrevealing  Unasyn for 5 days likely overkill      Endocrine: No known history of endocrinopathy  Sugars elevated will up sliding scale likely secondary to steroids    Steroids were only started anecdotally given the possibility this was an anaphylactic reaction we we will taper off over the next  48 hours      ICU checklist  Feeding: Possible swallow study today  Analgesia: Precedex  Sedation:   Thromboembolism PPX: Heparin drip  Stress Ulcer PPX: PPI  Glucose control: Sliding scale  Bowel Regimen:   Lines/drains/tubes:   Deescalation of antibiotics: Unasyn 5 days  Therapies:PT/OT/ST when appropriate    Full code    Upon my evaluation, this patient had a high probability of imminent or life-threatening deterioration due to circulatory failure and respiratory failure, which required my direct attention, intervention, and personal management.    I have personally provided 45 minutes of critical care time, exclusive of time spent on separately billable procedures.  Time includes review of all pertinent laboratory/radiology results, discussion with consultants, and monitoring for potential decompensation.  Performed interventions included pressor drugs and managing mechanical ventilation.              Hemodynamics  24h Vitals:  VitalLast Value (24 Hour)24 Hour Range  Unsu877.4 °F (38 °C)Temp  Min: 96.4 °F (35.8 °C)  Max: 100.4 °F (38 °C)  [id number]  Min: 38  Max: 141  BP (Non-Invasive)104/59 BP  Min: 73/31  Max: 184/145  BP (A-Line)91/66AO  Min: 76/68  Max: 117/72  CVP  could not be evaluated. This SmartLink does not work with rows of the type:   [id number]  Min: 13  Max: 43  [id number] %SpO2  Min: 76 %  Max: 100 %  O2 Therapy

## 2025-06-17 ENCOUNTER — APPOINTMENT (OUTPATIENT)
Dept: GENERAL RADIOLOGY | Facility: HOSPITAL | Age: 60
End: 2025-06-17
Attending: INTERNAL MEDICINE
Payer: COMMERCIAL

## 2025-06-17 PROBLEM — G47.33 OSA (OBSTRUCTIVE SLEEP APNEA): Status: ACTIVE | Noted: 2025-06-17

## 2025-06-17 LAB
ALBUMIN SERPL-MCNC: 3.8 G/DL (ref 3.2–4.8)
ALBUMIN/GLOB SERPL: 1.7 {RATIO} (ref 1–2)
ALP LIVER SERPL-CCNC: 73 U/L (ref 45–117)
ALT SERPL-CCNC: 26 U/L (ref 10–49)
ANION GAP SERPL CALC-SCNC: 10 MMOL/L (ref 0–18)
ANION GAP SERPL CALC-SCNC: 10 MMOL/L (ref 0–18)
APTT PPP: 41.6 SECONDS (ref 23–36)
ARTERIAL PATENCY WRIST A: POSITIVE
AST SERPL-CCNC: 37 U/L (ref ?–34)
BASE EXCESS BLDA CALC-SCNC: 11.9 MMOL/L (ref ?–2)
BASOPHILS # BLD AUTO: 0.03 X10(3) UL (ref 0–0.2)
BASOPHILS NFR BLD AUTO: 0.3 %
BILIRUB SERPL-MCNC: 0.5 MG/DL (ref 0.2–1.1)
BODY TEMPERATURE: 98.6 F
BUN BLD-MCNC: 28 MG/DL (ref 9–23)
BUN BLD-MCNC: 29 MG/DL (ref 9–23)
CA-I BLD-SCNC: 1.14 MMOL/L (ref 0.95–1.32)
CALCIUM BLD-MCNC: 8.6 MG/DL (ref 8.7–10.6)
CALCIUM BLD-MCNC: 8.9 MG/DL (ref 8.7–10.6)
CHLORIDE SERPL-SCNC: 98 MMOL/L (ref 98–112)
CHLORIDE SERPL-SCNC: 99 MMOL/L (ref 98–112)
CO2 SERPL-SCNC: 34 MMOL/L (ref 21–32)
CO2 SERPL-SCNC: 35 MMOL/L (ref 21–32)
COHGB MFR BLD: 2.5 % SAT (ref 0–3)
CREAT BLD-MCNC: 1.22 MG/DL (ref 0.7–1.3)
CREAT BLD-MCNC: 1.23 MG/DL (ref 0.7–1.3)
EGFRCR SERPLBLD CKD-EPI 2021: 67 ML/MIN/1.73M2 (ref 60–?)
EGFRCR SERPLBLD CKD-EPI 2021: 68 ML/MIN/1.73M2 (ref 60–?)
EOSINOPHIL # BLD AUTO: 0.03 X10(3) UL (ref 0–0.7)
EOSINOPHIL NFR BLD AUTO: 0.3 %
ERYTHROCYTE [DISTWIDTH] IN BLOOD BY AUTOMATED COUNT: 13.6 %
GLOBULIN PLAS-MCNC: 2.3 G/DL (ref 2–3.5)
GLUCOSE BLD-MCNC: 112 MG/DL (ref 70–99)
GLUCOSE BLD-MCNC: 115 MG/DL (ref 70–99)
GLUCOSE BLD-MCNC: 123 MG/DL (ref 70–99)
GLUCOSE BLD-MCNC: 126 MG/DL (ref 70–99)
GLUCOSE BLD-MCNC: 131 MG/DL (ref 70–99)
GLUCOSE BLD-MCNC: 155 MG/DL (ref 70–99)
HCO3 BLDA-SCNC: 34.2 MEQ/L (ref 21–27)
HCT VFR BLD AUTO: 32.1 % (ref 39–53)
HGB BLD-MCNC: 11.1 G/DL (ref 13–17.5)
HGB BLD-MCNC: 12.5 G/DL (ref 13–17.5)
IMM GRANULOCYTES # BLD AUTO: 0.26 X10(3) UL (ref 0–1)
IMM GRANULOCYTES NFR BLD: 2.3 %
L/M: 8 L/MIN
LACTATE BLD-SCNC: 0.8 MMOL/L (ref 0.5–2)
LYMPHOCYTES # BLD AUTO: 1.22 X10(3) UL (ref 1–4)
LYMPHOCYTES NFR BLD AUTO: 10.9 %
MAGNESIUM SERPL-MCNC: 1.9 MG/DL (ref 1.6–2.6)
MAGNESIUM SERPL-MCNC: 2.1 MG/DL (ref 1.6–2.6)
MCH RBC QN AUTO: 30.8 PG (ref 26–34)
MCHC RBC AUTO-ENTMCNC: 34.6 G/DL (ref 31–37)
MCV RBC AUTO: 89.2 FL (ref 80–100)
METHGB MFR BLD: 0.6 % SAT (ref 0.4–1.5)
MONOCYTES # BLD AUTO: 0.8 X10(3) UL (ref 0.1–1)
MONOCYTES NFR BLD AUTO: 7.1 %
NEUTROPHILS # BLD AUTO: 8.87 X10 (3) UL (ref 1.5–7.7)
NEUTROPHILS # BLD AUTO: 8.87 X10(3) UL (ref 1.5–7.7)
NEUTROPHILS NFR BLD AUTO: 79.1 %
OSMOLALITY SERPL CALC.SUM OF ELEC: 303 MOSM/KG (ref 275–295)
OSMOLALITY SERPL CALC.SUM OF ELEC: 305 MOSM/KG (ref 275–295)
OXYHGB MFR BLDA: 97 % (ref 92–100)
PCO2 BLDA: 50 MM HG (ref 35–45)
PH BLDA: 7.48 [PH] (ref 7.35–7.45)
PLATELET # BLD AUTO: 205 10(3)UL (ref 150–450)
PO2 BLDA: 117 MM HG (ref 80–100)
POTASSIUM BLD-SCNC: 3.6 MMOL/L (ref 3.6–5.1)
POTASSIUM SERPL-SCNC: 3.2 MMOL/L (ref 3.5–5.1)
POTASSIUM SERPL-SCNC: 3.4 MMOL/L (ref 3.5–5.1)
PROT SERPL-MCNC: 6.1 G/DL (ref 5.7–8.2)
RBC # BLD AUTO: 3.6 X10(6)UL (ref 4.3–5.7)
SODIUM BLD-SCNC: 138 MMOL/L (ref 135–145)
SODIUM SERPL-SCNC: 143 MMOL/L (ref 136–145)
SODIUM SERPL-SCNC: 143 MMOL/L (ref 136–145)
WBC # BLD AUTO: 11.2 X10(3) UL (ref 4–11)

## 2025-06-17 PROCEDURE — 99233 SBSQ HOSP IP/OBS HIGH 50: CPT | Performed by: EMERGENCY MEDICINE

## 2025-06-17 PROCEDURE — 71045 X-RAY EXAM CHEST 1 VIEW: CPT | Performed by: INTERNAL MEDICINE

## 2025-06-17 PROCEDURE — 99232 SBSQ HOSP IP/OBS MODERATE 35: CPT | Performed by: HOSPITALIST

## 2025-06-17 PROCEDURE — 99232 SBSQ HOSP IP/OBS MODERATE 35: CPT | Performed by: INTERNAL MEDICINE

## 2025-06-17 RX ORDER — DOXEPIN HYDROCHLORIDE 50 MG/1
1 CAPSULE ORAL DAILY
Status: DISCONTINUED | OUTPATIENT
Start: 2025-06-18 | End: 2025-06-22

## 2025-06-17 RX ORDER — FUROSEMIDE 10 MG/ML
60 INJECTION INTRAMUSCULAR; INTRAVENOUS
Status: DISCONTINUED | OUTPATIENT
Start: 2025-06-17 | End: 2025-06-21

## 2025-06-17 RX ORDER — METOPROLOL SUCCINATE 50 MG/1
50 TABLET, EXTENDED RELEASE ORAL 2 TIMES DAILY
Status: DISCONTINUED | OUTPATIENT
Start: 2025-06-17 | End: 2025-06-22

## 2025-06-17 RX ORDER — ACETAMINOPHEN 500 MG
1000 TABLET ORAL EVERY 6 HOURS
Status: COMPLETED | OUTPATIENT
Start: 2025-06-17 | End: 2025-06-18

## 2025-06-17 RX ORDER — MORPHINE SULFATE 4 MG/ML
4 INJECTION, SOLUTION INTRAMUSCULAR; INTRAVENOUS EVERY 4 HOURS PRN
Status: DISCONTINUED | OUTPATIENT
Start: 2025-06-17 | End: 2025-06-22

## 2025-06-17 NOTE — PROGRESS NOTES
[facility]   part of Merged with [facility]     Hospitalist Progress Note     Josep Frost Patient Status:  Inpatient    1965 MRN [id number]   Location [facility] 4SW-A Attending Jumana Doe MD   Hosp Day # 4 PCP NEELIMA JEFFERSON     Chief Complaint: cardiac arrest    Subjective:     Patient is sitting up in the chair    Objective:    Review of Systems:   Negative  Wt 435 from 405    Vital signs:  Temp:  [98.2 °F (36.8 °C)-99.9 °F (37.7 °C)] 98.2 °F (36.8 °C)  Pulse:  [63-80] 69  Resp:  [16-39] 29  BP: (133-167)/(60-85) 145/83  SpO2:  [93 %-99 %] 97 %  AO: (154)/(62) 154/62    Physical Exam:    General: stable  Respiratory: No wheezes, no rhonchi  Cardiovascular: S1, S2, regular rate and rhythm  Abdomen: Soft, Non-tender, non-distended, positive bowel sounds  Neuro: No new focal deficits.   Extremities: No edema      Diagnostic Data:    Labs:  Recent Labs   Lab 25  1656 25  0419 06/15/25  0159 06/15/25  0958 25  0408 25  0639   WBC 23.9* 32.9* 21.9* 13.5* 10.7 11.2*   HGB 15.6 15.6 13.7 12.5* 11.2* 11.1*   MCV 93.1 89.9 88.6 88.0 88.7 89.2   .0 349.0 214.0 172.0 163.0 205.0   BAND 8 11  --   --   --   --    INR 1.41* 1.23* 1.27*  --  1.09  --        Recent Labs   Lab 06/15/25  0159 06/15/25  0840 25  0408 25  1643 25  0639   *   < > 243* 139* 123*   BUN 30*   < > 36* 33* 29*   CREATSERUM 1.56*   < > 1.40* 1.24 1.23   CA 8.7   < > 8.7 9.0 8.9   ALB 3.9  --  3.6  --  3.8      < > 137 141 143   K 4.7   < > 3.9 3.5 3.4*      < > 99 98 99   CO2 26.0   < > 30.0 34.0* 34.0*   ALKPHO 86  --  66  --  73   AST 47*  --  31  --  37*   ALT 33  --  25  --  26   BILT 0.8  --  0.4  --  0.5   TP 6.2  --  5.8  --  6.1    < > = values in this interval not displayed.       Estimated Glomerular Filtration Rate: 67 mL/min/1.73m2 (result from lab).    Recent Labs   Lab 25  1656 256 25  0419   TROPHS 105* 1,120* 786*       Recent Labs    Lab 06/14/25  0419 06/15/25  0159 06/16/25  0408   PTP 15.6* 16.1* 14.2   INR 1.23* 1.27* 1.09                    Microbiology    Hospital Encounter on 06/13/25   1. Blood Culture     Status: None (Preliminary result)    Collection Time: 06/14/25  2:59 PM    Specimen: Blood,peripheral   Result Value Ref Range    Blood Culture Result No Growth 2 Days N/A         Imaging: Reviewed in Epic.    Medications: Scheduled Medications[1]    Assessment & Plan:      # PEA arrest    - thought 2/2 definity contrast reaction    - EKG on admission with Afib with RBBB (seen on prior)   - TTE with limited views, though preserved EF, no tamponade physiology or significant valvular abnormalities     - CTA negative for PE   - CTH unremarkable   - Empiric IV abx   -wbc better  -VSS  -wean steroids        # Afib       - s/p successful cardioversion 5/13/2025   - Cardiology following     # NSTEMI, likely type II   - continue trending trops    - cardiology following     # Morbid obesity, BMI 50    #PreDM, A1c 6.0    # Leukocytosis  Improved         Supplementary Documentation:     Quality:  DVT Mechanical Prophylaxis:   SCDs, Early ambuation  DVT Pharmacologic Prophylaxis   Medication    rivaroxaban (Xarelto) tab 20 mg                Code Status: Full Code  Tinajero: No urinary catheter in place  T[place]ajero Duration (in days): 2  Central line: central venous catheter in place  DERIC:     Discharge is dependent on: progress  At this point Mr. Frost is expected to be discharge to: tbd    The 21st Century Cures Act makes medical notes like these available to patients in the interest of transparency. Please be advised this is a medical document. Medical documents are intended to carry relevant information, facts as evident, and the clinical opinion of the practitioner. The medical note is intended as peer to peer communication and may appear blunt or direct. It is written in medical language and may contain abbreviations or verbiage that are  unfamiliar.                         [1]    metoprolol succinate ER  50 mg Oral BID    rivaroxaban  20 mg Oral Daily with food    potassium chloride  40 mEq Intravenous Once    hydrocortisone sodium succinate  25 mg Intravenous q12h    furosemide  40 mg Intravenous BID (Diuretic)    ampicillin-sulbactam  3 g Intravenous Q6H    insulin aspart  2-10 Units Subcutaneous 4 times per day

## 2025-06-17 NOTE — PLAN OF CARE
Took over pt care. Pt dylan NC. Easy Resp. C/O chest/sternal pain. Post chest compressions. Pt up to chair with PT/OT. Dylan chair well. Stopped Heparin gtt. Xarelto restarted. Amio gtt stopped. Metoprolol restarted. DC'd TLC. Dylan well. Diuretics given. Voiding per urinal. Dylan diet. Updated wife with POC. Continue supportive care. Possible tx to floor tomorrow.     Problem: RESPIRATORY - ADULT  Goal: Achieves optimal ventilation and oxygenation  Description: INTERVENTIONS:  - Assess for changes in respiratory status  - Assess for changes in mentation and behavior  - Position to facilitate oxygenation and minimize respiratory effort  - Oxygen supplementation based on oxygen saturation or ABGs  - Provide Smoking Cessation handout, if applicable  - Encourage broncho-pulmonary hygiene including cough, deep breathe, Incentive Spirometry  - Assess the need for suctioning and perform as needed  - Assess and instruct to report SOB or any respiratory difficulty  - Respiratory Therapy support as indicated  - Manage/alleviate anxiety  - Monitor for signs/symptoms of CO2 retention  Outcome: Progressing     Problem: GASTROINTESTINAL - ADULT  Goal: Minimal or absence of nausea and vomiting  Description: INTERVENTIONS:  - Maintain adequate hydration with IV or PO as ordered and tolerated  - Nasogastric tube to low intermittent suction as ordered  - Evaluate effectiveness of ordered antiemetic medications  - Provide nonpharmacologic comfort measures as appropriate  - Advance diet as tolerated, if ordered  - Obtain nutritional consult as needed  - Evaluate fluid balance  Outcome: Progressing  Goal: Maintains or returns to baseline bowel function  Description: INTERVENTIONS:  - Assess bowel function  - Maintain adequate hydration with IV or PO as ordered and tolerated  - Evaluate effectiveness of GI medications  - Encourage mobilization and activity  - Obtain nutritional consult as needed  - Establish a toileting routine/schedule  -  Consider collaborating with pharmacy to review patient's medication profile  Outcome: Progressing     Problem: CARDIOVASCULAR - ADULT  Goal: Maintains optimal cardiac output and hemodynamic stability  Description: INTERVENTIONS:  - Monitor vital signs, rhythm, and trends  - Monitor for bleeding, hypotension and signs of decreased cardiac output  - Evaluate effectiveness of vasoactive medications to optimize hemodynamic stability  - Monitor arterial and/or venous puncture sites for bleeding and/or hematoma  - Assess quality of pulses, skin color and temperature  - Assess for signs of decreased coronary artery perfusion - ex. Angina  - Evaluate fluid balance, assess for edema, trend weights  Outcome: Progressing  Goal: Absence of cardiac arrhythmias or at baseline  Description: INTERVENTIONS:  - Continuous cardiac monitoring, monitor vital signs, obtain 12 lead EKG if indicated  - Evaluate effectiveness of antiarrhythmic and heart rate control medications as ordered  - Initiate emergency measures for life threatening arrhythmias  - Monitor electrolytes and administer replacement therapy as ordered  Outcome: Progressing

## 2025-06-17 NOTE — PAYOR COMM NOTE
--------------  6/17 CONTINUED STAY REVIEW    Payor: [facility] CORE/NAVIGATE  Subscriber #:  [id number]  Authorization Number: [id number]    REMAINS IN ICU    CRITICAL CARE:    Subjective/24h events: Patient extubated yesterday looks well on amiodarone drip but appears to be in sinus  Will add metoprolol home dose  Switch to rivaroxaban discontinue heparin     Assessment and Plan: 60-year-old male status post cardiac arrest possible anaphylactic reaction versus air embolism     Neuro/Psych: Anoxic encephalopathy appears for the most part resolved some short-term memory deficits  Aggressive PT OT     Cardiovascular: Cardiac arrest presumably secondary to anaphylactic reaction in the setting of likely pickwickian/EL causing hypoxia and then cardiac arrest versus air embolism??  Echo reviewed fairly benign  No further shock  Off vasopressors  Chest pain reproducible likely secondary to occult rib fractures           Known history of A-fib with ablation on amiodarone now in sinus rhythm will restart home metoprolol  Heparin drip to home Xarelto  On amiodarone appreciate cardiology recommendation with respect to transition to sotalol or continue with amiodarone previously patient had some issues with thyroid therefore would like to discontinue  Aggressive potassium replacement     Pulmonary: Hypoxemic respiratory failure extubated yesterday continue CPAP at night  Still with mild dyspnea Lasix should help continue home dose     GI: No ischemic hepatopathy that is pertinent  Discontinue GI prophylaxis unless on it at home  Cardiac diet     Renal/Metabolic: Acute kidney injury improving continue Lasix 40 twice daily     Heme: No significant anemia thrombocytopenia or coagulopathy     ID: Leukocytosis resolved culture data unrevealing  Unasyn for 5 days likely overkill      Endocrine: No known history of endocrinopathy  Sugars elevated will up sliding scale likely secondary to steroids     Steroids to and shortly      ICU  checklist  Feeding: Cardiac diet  Analgesia: Precedex  Sedation:   Thromboembolism PPX: Heparin drip to Xarelto  Stress Ulcer PPX: PPI  Glucose control: Sliding scale  Bowel Regimen:   Lines/drains/tubes:   Deescalation of antibiotics: Unasyn 5 days  Therapies:PT/OT/ST when appropriate          CARDS:  Pt w/some SOB, chest soreness (likely 2/2 CPR); overall doing very well. Extubated yesterday, now on 8L O2. Family at bedside     Objective:  /83   Pulse 69   Temp 98.2 °F (36.8 °C) (Temporal)   Resp (!) 29   Ht 6' 3\" (1.905 m)   Wt (!) 435 lb 3 oz (197.4 kg)   SpO2 97%   BMI 54.39 kg/m²      Telemetry: NSR, 1st deg AVB, RBBB    06/17/25 0100 (!) 435 lb 3 oz (197.4 kg)   06/16/25 0400 (!) 411 lb 9.6 oz (186.7 kg)   06/15/25 0400 (!) 405 lb 10.3 oz (184 kg)   06/13/25 2045 (!) 403 lb 7.1 oz (183 kg)     XR CHEST AP PORTABLE  Result Date: 6/17/2025   Cardiomegaly with mild interstitial edema.       Physical Exam:  Gen: alert, oriented x 3, NAD  Heent: pupils equal, reactive. Mucous membranes moist.   Neck: no jvd  Cardiac: regular rate and rhythm, normal S1,S2, no murmur, clicks, rub or gallop  Lungs: diminished  Abd: distended +bs  Ext: generalized edema  Skin: Warm, dry  Neuro: No focal deficits        Assessment:  Pt is a 60 year old male who presented to [facility] clinic for echo; soon after receiving Definity contrast, became SOB, hypoxic, PEA arrest, then asystolic. ROSC achieved after 10 mins CPR  Cardiopulmonary Arrest, PEA then Asystole    ? Anaphylactic Reaction - Hypoxia, arrest s/p Definity w/echo  Required multiple rounds of CPR  Echo w/LVEF 55-60%, no WMA  Acute Hypoxic/Hypercapneic Respiratory Failure, Poss Aspiration PNA  Now extubated - on 8L  Pulm following  Elevated Troponin, Type II  Trop 105 > 1120 > 786 - likely supply/demand d/t above  Volume Overload, Iatrogenic  Diuresis w/IV lasix, excellent u/o thus far  Net + 3.1 L since admit  Paroxysmal Atrial Fibrillation/Flutter  Hx Prior  AFib/flutter Ablation 4/2025, DCCV 5/2025  Initially w/brief PAF episodes on IV amiodarone - now stopped  Maintaining NSR - on BB  TSH WNL  Hx on flecainide prior to admit  [id number] (HTN) - on xarelto 20mg daily  ROSEMARY - Cr improving w/diuresis   Morbid Obesity  EL- hx CPAP     Plan:  Maintaining NSR - continue toprol 50mg po BID; hx on flecainide prior to admission. Repeat 12 lead today to assess QTc. Will discuss flecainide with EP.    Continue xarelto for stroke prophylaxis  Pt remains volume overloaded - continue IV Lasix  Overall doing very well - PT/OT, encourage IS    MEDICATIONS ADMINISTERED IN LAST 1 DAY:  amiodarone in dextrose 4.14% (Cordarone) 360 mg/200mL infusion premix       Date Action Dose Route User    6/16/2025 1854 New Bag 0.5 mg/min Intravenous Arely Young RN          ampicillin-sulbactam (Unasyn) 3 g in sodium chloride 0.9% 100mL [id number]       Date Action Dose Route User    6/17/2025 1211 New Bag 3 g Intravenous Amadeo Ortiz RN    6/17/2025 0608 New Bag 3 g Intravenous Charito Cox RN    6/17/2025 0056 New Bag 3 g Intravenous Charito Cox RN    6/16/2025 1835 New Bag 3 g Intravenous Arely Young RN          heparin (Porcine) 58307 units/250mL infusion ACS/AFIB CONTINUOUS       Date Action Dose Route User    6/17/2025 0149 Hi-Risk Rate/Dose Change 2,200 Units/hr Intravenous Charito Cox RN    6/16/2025 2009 New Bag 2,000 Units/hr Intravenous Charito Cox RN    6/16/2025 1943 Hi-Risk Rate/Dose Change 2,000 Units/hr Intravenous Arely Young, ELISSA          furosemide (Lasix) 10 mg/mL injection 40 mg       Date Action Dose Route User    6/17/2025 0740 Given 40 mg Intravenous Amadeo Ortiz RN    6/16/2025 1637 Given 40 mg Intravenous O'Cholo, Arely, RN          heparin (Porcine) 1000 UNIT/ML injection 3,000 Units       Date Action Dose Route User    6/16/2025 1944 Given 3,000 Units Intravenous Arely Young, RN           hydrocortisone Na succinate PF (Solu-CORTEF) injection 50 mg       Date Action Dose Route User    6/16/2025 1837 Given 50 mg Intravenous Arely Young RN          hydrocortisone Na succinate PF (Solu-CORTEF) injection 25 mg       Date Action Dose Route User    6/17/2025 0608 Given 25 mg Intravenous Charito Cox RN          insulin aspart (NovoLOG) 100 Units/mL FlexPen 2-10 Units       Date Action Dose Route User    6/17/2025 0000 Given 2 Units Subcutaneous (Left Lower Abdomen) Charito Cox RN     metoprolol succinate ER (Toprol XL) 24 hr tab 50 mg       Date Action Dose Route User    6/17/2025 0843 Given 50 mg Oral Amadeo Ortiz RN          pantoprazole (Protonix) 40 mg in sodium chloride 0.9% PF 10 mL IV push       Date Action Dose Route User    6/16/2025 2037 Given 40 mg Intravenous Charito Cox RN          potassium chloride 40 mEq in 250mL sodium chloride 0.9% IVPB premix       Date Action Dose Route User    6/17/2025 0910 New Bag 40 mEq Intravenous Amadeo Ortiz RN          rivaroxaban (Xarelto) tab 20 mg       Date Action Dose Route User    6/17/2025 0924 Given 20 mg Oral Amadeo Ortiz RN            Vitals (last day)       Date/Time Temp Pulse Resp BP SpO2 Weight O2 Device O2 Flow Rate (L/min) [facility]    06/17/25 1200 98 °F (36.7 °C) 67 29 141/81 98 % -- -- -- DA    06/17/25 1131 -- 64 27 116/94 95 % -- -- -- DA    06/17/25 1100 -- 63 27 164/101 98 % -- -- -- DA    06/17/25 1000 -- 65 28 151/82 97 % -- -- -- DA    06/17/25 0900 -- 66 27 155/77 97 % -- -- -- DA    06/17/25 0800 -- 69 29 145/83 96 % -- -- -- DA    06/17/25 0744 -- 66 23 -- 97 % -- -- -- SP    06/17/25 0744 -- -- -- -- -- -- Nasal cannula 8 L/min DA    06/17/25 0700 98.2 °F (36.8 °C) 67 25 143/80 97 % -- -- -- DA    06/17/25 0600 98.3 °F (36.8 °C) 66 21 151/75 95 % -- -- -- SP    06/17/25 0500 -- 64 22 149/77 96 % -- -- -- SP    06/17/25 0400 -- 63 16 134/65 95 % -- -- 8 L/min SP    06/17/25 0344 -- 67 20  -- -- -- -- -- SP    06/17/25 0344 -- -- -- -- 93 % -- -- -- PS    06/17/25 0300 -- 72 24 167/68 93 % -- -- -- SP    06/17/25 0200 -- 65 18 139/72 93 % -- -- -- SP    06/17/25 0100 -- 71 23 -- -- 435 lb 3 oz (197.4 kg) -- -- SP    06/17/25 0054 -- 73 24 -- -- -- -- -- SP    06/17/25 0000 99.9 °F (37.7 °C) 74 28 160/85 -- -- -- -- SP    06/16/25 2300 -- 66 18 143/80 -- -- -- -- SP    06/16/25 2200 -- 72 24 156/81 94 % -- -- -- SP    06/16/25 2144 -- 73 27 -- 95 % -- -- 4 L/min SP    06/16/25 2100 -- 80 20 142/76 94 % -- -- -- SP    06/16/25 2000 98.8 °F (37.1 °C) 70 28 144/62 95 % -- Nasal cannula 4 L/min SP    06/16/25 1921 -- -- -- -- -- -- Nasal cannula 4 L/min PS    06/16/25 1800 -- 75 24 151/60 98 % -- -- -- ELIANE    06/16/25 1700 -- 71 30 160/85 97 % -- -- -- ELIANE    06/16/25 1600 -- 69 24 149/72 99 % -- Nasal cannula 4 L/min ELIANE

## 2025-06-17 NOTE — PROGRESS NOTES
Progress Note  Josep Willy Frost Patient Status:  Inpatient    1965 MRN [id number]   Formerly [facility] 4SW-A Attending Glendy Hayden MD   Hosp Day # 4 PCP NEELIMA JEFFERSON     Subjective:  Pt w/some SOB, chest soreness (likely 2/2 CPR); overall doing very well. Extubated yesterday, now on 8L O2. Family at bedside    Objective:  /83   Pulse 69   Temp 98.2 °F (36.8 °C) (Temporal)   Resp (!) 29   Ht 6' 3\" (1.905 m)   Wt (!) 435 lb 3 oz (197.4 kg)   SpO2 97%   BMI 54.39 kg/m²     Telemetry: NSR, 1st deg AVB, RBBB    Intake/Output:    Intake/Output Summary (Last 24 hours) at 2025 1011  Last data filed at 2025 0846  Gross per 24 hour   Intake 1622.9 ml   Output 5325 ml   Net -3702.1 ml       Last 3 Weights   25 0100 (!) 435 lb 3 oz (197.4 kg)   25 0400 (!) 411 lb 9.6 oz (186.7 kg)   06/15/25 0400 (!) 405 lb 10.3 oz (184 kg)   25 2045 (!) 403 lb 7.1 oz (183 kg)   25 1512 (!) 380 lb (172.4 kg)   25 1207 (!) 380 lb (172.4 kg)       Labs:  Recent Labs   Lab 25  0408 25  1643 25  0639   * 139* 123*   BUN 36* 33* 29*   CREATSERUM 1.40* 1.24 1.23   EGFRCR 58* 67 67   CA 8.7 9.0 8.9    141 143   K 3.9 3.5 3.4*   CL 99 98 99   CO2 30.0 34.0* 34.0*     Recent Labs   Lab 06/15/25  0159 06/15/25  0958 25  0408 25  0639   RBC 4.38 4.08* 3.71* 3.60*   HGB 13.7 12.5* 11.2* 11.1*   HCT 38.8* 35.9* 32.9* 32.1*   MCV 88.6 88.0 88.7 89.2   MCH 31.3 30.6 30.2 30.8   MCHC 35.3 34.8 34.0 34.6   RDW 13.5 13.6 13.6 13.6   NEPRELIM 17.31*  --  8.95* 8.87*   WBC 21.9* 13.5* 10.7 11.2*   .0 172.0 163.0 205.0         Recent Labs   Lab 25  1656 25  0419   TROPHS 105* 1,120* 786*       Diagnostics:  XR CHEST AP PORTABLE  (CPT=71045)  Result Date: 2025  CONCLUSION:  Cardiomegaly with mild interstitial edema.   LOCATION:  [facility]      Dictated by (CST): Anibal Aguilar MD on 2025 at 7:20 AM     Finalized  by (CST): Anibal Aguilar MD on 6/17/2025 at 7:21 AM        Review of Systems   Cardiovascular:  Positive for dyspnea on exertion and leg swelling.   Respiratory:  Positive for shortness of breath.    Musculoskeletal:  Positive for myalgias.       Physical Exam:    Gen: alert, oriented x 3, NAD  Heent: pupils equal, reactive. Mucous membranes moist.   Neck: no jvd  Cardiac: regular rate and rhythm, normal S1,S2, no murmur, clicks, rub or gallop  Lungs: diminished  Abd: distended +bs  Ext: generalized edema  Skin: Warm, dry  Neuro: No focal deficits      Medications:    Scheduled Medications[1]  Medication Infusions[2]      Assessment:  Pt is a 60 year old male who presented to [facility] clinic for echo; soon after receiving Definity contrast, became SOB, hypoxic, PEA arrest, then asystolic. ROSC achieved after 10 mins CPR  Cardiopulmonary Arrest, PEA then Asystole    ? Anaphylactic Reaction - Hypoxia, arrest s/p Definity w/echo  Required multiple rounds of CPR  Echo w/LVEF 55-60%, no WMA  Acute Hypoxic/Hypercapneic Respiratory Failure, Poss Aspiration PNA  Now extubated - on 8L  Pulm following  Elevated Troponin, Type II  Trop 105 > 1120 > 786 - likely supply/demand d/t above  Volume Overload, Iatrogenic  Diuresis w/IV lasix, excellent u/o thus far  Net + 3.1 L since admit  Paroxysmal Atrial Fibrillation/Flutter  Hx Prior AFib/flutter Ablation 4/2025, DCCV 5/2025  Initially w/brief PAF episodes on IV amiodarone - now stopped  Maintaining NSR - on BB  TSH WNL  Hx on flecainide prior to admit  [id number] 1 (HTN) - on xarelto 20mg daily  ROSEMARY - Cr improving w/diuresis   Morbid Obesity  EL- hx CPAP    Plan:  Maintaining NSR - continue toprol 50mg po BID; hx on flecainide prior to admission. Repeat 12 lead today to assess QTc. Will discuss flecainide with EP.    Continue xarelto for stroke prophylaxis  Pt remains volume overloaded - continue IV Lasix  Overall doing very well - PT/OT, encourage IS    Plan of care discussed with  patient, RN.    Georgina Martinez, APRN  6/17/2025  10:11 AM          Patient seen and examined independently.  Note reviewed and labs reviewed.  Agree to the assessment and plan with the following additions/changes.  Entire medical decision making & plan performed by myself.    A/P:  Cardiopulmonary arrest secondary to hypoxia  Suspected Anaphylactic reaction following Definity injection  T2MI, PAF, Morbid obesity  Doing as well as can be hoped so far  IV lasix, Xarelto    LOC L2    Baljinder Fu MD  6/17/2025  Interventional Cardiology  [facility]  =======================================================           [1]    metoprolol succinate ER  50 mg Oral BID    rivaroxaban  20 mg Oral Daily with food    potassium chloride  40 mEq Intravenous Once    hydrocortisone sodium succinate  25 mg Intravenous q12h    furosemide  40 mg Intravenous BID (Diuretic)    ampicillin-sulbactam  3 g Intravenous Q6H    insulin aspart  2-10 Units Subcutaneous 4 times per day   [2]    dextrose 10%

## 2025-06-17 NOTE — PLAN OF CARE
Assumed care of pt after RN report. Initially intubated, extubated around 0825. Weaned to 4L NC. NSR. Amio gtt continued. BP stable. Tinajero cath in place. Passed bedside swallow, no appetite. BM x1, mucous. Heparin gtt continued, see MAR. See flowsheets for full assessments. POC discussed with pt and family members bedside.

## 2025-06-17 NOTE — DISCHARGE INSTRUCTIONS
It is recommended you follow up with an outpatient Registered Dietitian.    At [facility] we offer nutrition counseling for outpatients designed to cater to individual needs, lifestyle and goals. Consultations are provided on a variety of nutrition-related diseases and concerns including, but not limited to, heart health, weight management, GI, renal and healthy eating.   To make an appointment contact central scheduling  at [phone]. Further questions? Contact the outpatient RD [phone].         [place] [address]  Phone: [phone]  Fax: [phone]

## 2025-06-17 NOTE — PLAN OF CARE
Report received from previous RN.  Pt A&O x 4 slight confused on time.but was able to correct himself. Pupils equal and rx. Strong hand . Pt c.o chest pain from CPR medicated accordingly. Lungs DM on 4 l NC   Using IS and 500/1000   encourage CDB  CPAP at night Cardiac monitor SR and stable BP. Abd soft and obese + BS. Poor appetite. Spouse at his bedside and updates given   please see Flowsheet for further assessment

## 2025-06-17 NOTE — PHYSICAL THERAPY NOTE
PHYSICAL THERAPY EVALUATION - INPATIENT     Room Number: 467/467-A  Evaluation Date: 6/17/2025  Type of Evaluation: Initial  Physician Order: PT Eval and Treat    Presenting Problem: cardiac arrest  Co-Morbidities : a-fib  Reason for Therapy: Mobility Dysfunction and Discharge Planning    PHYSICAL THERAPY ASSESSMENT   Patient is a 60 year old male admitted 6/13/2025 for cardiac arrest.  Prior to admission, patient's baseline is independent.  Patient is currently functioning below baseline with bed mobility, transfers, gait, and stair negotiation.  Patient is requiring minimal assist as a result of the following impairments: decreased functional strength, decreased endurance/aerobic capacity, decreased muscular endurance, and increased O2 needs from baseline.  Physical Therapy will continue to follow for duration of hospitalization.    Patient will benefit from continued skilled PT Services at discharge to promote prior level of function and safety with additional support and return home with home health PT.    PLAN DURING HOSPITALIZATION  Nursing Mobility Recommendation : 1 Assist  PT Device Recommendation: Rolling walker  PT Treatment Plan: Bed mobility, Body mechanics, Endurance, Energy conservation, Patient education, Family education, Gait training, Stair training, Transfer training, Balance training  Rehab Potential : Good  Frequency (Obs): 5x/week     CURRENT GOALS    Goal #1 Patient is able to demonstrate supine - sit EOB @ level: modified independent     Goal #2 Patient is able to demonstrate transfers Sit to/from Stand at assistance level: modified independent     Goal #3 Patient is able to ambulate 150 feet with assist device: walker - rolling at assistance level: modified independent     Goal #4 Pt to be able to ascend/descend 2 steps with use of rail and supervision   Goal #5    Goal #6    Goal Comments: Goals established on 6/17/2025      PHYSICAL THERAPY MEDICAL/SOCIAL HISTORY  History related to  current admission: Patient is a 60 year old male admitted on 2025 from a facility where pt was having an echocardiogram for cardiac arrest.    HOME SITUATION  Type of Home: House  Home Layout: Two level, Able to live on main level  Stairs to Enter : 2   Railing: Yes              Lives With: Spouse    Drives: Yes   Patient Regularly Uses: Glasses      Prior Level of [place]: independent, works for [employer]    SUBJECTIVE  Pt is pleasant and cooperative.     OBJECTIVE  Precautions: Bed/chair alarm  Fall Risk: High fall risk    WEIGHT BEARING RESTRICTION     PAIN ASSESSMENT  Ratin  Location: chest  Management Techniques: Relaxation, Repositioning, Body mechanics    COGNITION  Overall Cognitive Status:  WFL - within functional limits    RANGE OF MOTION AND STRENGTH ASSESSMENT  Upper extremity ROM and strength are within functional limits     Lower extremity ROM is within functional limits     Lower extremity strength is within functional limits     BALANCE  Static Sitting: Fair +  Dynamic Sitting: Fair  Static Standing: Poor +  Dynamic Standing: Poor    ADDITIONAL TESTS                                    ACTIVITY TOLERANCE  Pulse: 72  Heart Rate Source: Monitor                   O2 WALK  Oxygen Therapy  SPO2% on Oxygen at Rest: 94  At rest oxygen flow (liters per minute): 6    NEUROLOGICAL FINDINGS                        AM-PAC '6-Clicks' INPATIENT SHORT FORM - BASIC MOBILITY  How much difficulty does the patient currently have...  Patient Difficulty: Turning over in bed (including adjusting bedclothes, sheets and blankets)?: A Little   Patient Difficulty: Sitting down on and standing up from a chair with arms (e.g., wheelchair, bedside commode, etc.): A Little   Patient Difficulty: Moving from lying on back to sitting on the side of the bed?: A Little   How much help from another person does the patient currently need...   Help from Another: Moving to and from a bed to a chair (including a  wheelchair)?: A Little   Help from Another: Need to walk in hospital room?: A Little   Help from Another: Climbing 3-5 steps with a railing?: Total     AM-PAC Score:  Raw Score: 16   Approx Degree of Impairment: 54.16%   Standardized Score (AM-PAC Scale): 40.78   CMS Modifier (G-Code): CK    FUNCTIONAL ABILITY STATUS  Gait Assessment   Functional Mobility/Gait Assessment  Gait Assistance: Minimum assistance  Distance (ft): 2  Assistive Device: Rolling walker  Pattern: Shuffle    Skilled Therapy Provided     Bed Mobility:  Rolling: Mod A  Supine to sit: Mod a   Sit to supine: NT     Transfer Mobility:  Sit to stand: Min A   Stand to sit: Min A  Gait = Min A with RW    Therapist's Comments: PT orders received, chart reviewed and RN approved PT session. Pt lying in bed and agreeable to PT. Vitals assessed and Monitored throughout session. Pt instructed in rolling body mechanics in bed, required assistance to complete, for perianal cleaning. Bed placed in chair position, pt required Mod A to come to a sitting position. Pt able to sit EOB x 12 minutes, completed knee extension x 10 each. Pt then instructed in proper hand placement and integration of RW with sit<>stand, instructed in gait sequencing and integration of RW with amb. Pt cued for upright posture in standing, and sequencing technique. Pt left up in chair, all needs in reach. Recommend up to chair 3 times per day with assist from RN staff. Pt in understanding. RN aware.     Exercise/Education Provided:  Bed mobility  Body mechanics  Energy conservation  Functional activity tolerated  Gait training  Transfer training    Patient End of Session: Up in chair, Needs met, Call light within reach, RN aware of session/findings, All patient questions and concerns addressed, Hospital anti-slip socks, Alarm set, Family present      Patient Evaluation Complexity Level:  History Moderate - 1 or 2 personal factors and/or co-morbidities   Examination of body systems Low -   addressing 1-2 elements   Clinical Presentation Low- Stable   Clinical Decision Making Low Complexity       PT Session Time: 45 minutes  Gait Training: 15 minutes  Therapeutic Activity: 10 minutes  Neuromuscular Re-education: 0 minutes  Therapeutic Exercise: 0 minutes

## 2025-06-17 NOTE — PROGRESS NOTES
[facility]  Progress Note    Josep Frost Patient Status:  Inpatient    1965 MRN [id number]   Location [facility] 4SW-A Attending Glendy Hayden MD   Hosp Day # 4 PCP NEELIMA JEFFERSON     Subjective:  Josep Frost is a(n) 60 year old male low-grade temp ongoing 99.9 overnight afebrile this a.m.  Continues to note significant chest pain with coughing moving or breathing  Not coughing remains on nasal cannula    States did well with CPAP overnight slept well      Objective:  /83   Pulse 69   Temp 98.2 °F (36.8 °C) (Temporal)   Resp (!) 29   Ht 6' 3\" (1.905 m)   Wt (!) 435 lb 3 oz (197.4 kg)   SpO2 97%   BMI 54.39 kg/m² nasal cannula      Temp (24hrs), Av.8 °F (37.1 °C), Min:98.2 °F (36.8 °C), Max:99.9 °F (37.7 °C)      Intake/Output:    Intake/Output Summary (Last 24 hours) at 2025 1238  Last data filed at 2025 0846  Gross per 24 hour   Intake 1622.9 ml   Output 4350 ml   Net -2727.1 ml       Physical Exam:   General: alert, cooperative, oriented.  No respiratory distress.  Forgetful at times   Head: Normocephalic, without obvious abnormality, atraumatic.   Throat: Lips, mucosa, and tongue normal.  No thrush noted.   Neck: trachea midline, no adenopathy, no thyromegaly. No JVD.   Lungs: Clear anteriorly though diminished excursion   Chest wall: No tenderness or deformity.   Heart: Regular rate and rhythm distant tones markedly protuberant stiffed seems nontender ongoing edema bilaterally   Abdomen: soft, non-distended, no masses, no guarding, no     Rebound.   Extremity:    Skin: No rashes or lesions.   Neurological: Alert, interactive, no focal deficits    Lab Data Review:  Recent Labs     06/15/25  0159 06/15/25  0958 25  0408 25  0639   WBC 21.9* 13.5* 10.7 11.2*   HGB 13.7 12.5* 11.2* 11.1*   .0 172.0 163.0 205.0     Recent Labs     06/15/25  0840 06/15/25  2208 25  0408 25  1643 25  0639    136 137 141 143   K 4.3 4.0  3.9 3.5 3.4*    99 99 98 99   CO2 28.0 30.0 30.0 34.0* 34.0*   BUN 31* 37* 36* 33* 29*   CREATSERUM 1.44* 1.41* 1.40* 1.24 1.23     Recent Labs   Lab 06/14/25  0419 06/15/25  0159 06/15/25  0840 06/16/25  0408 06/16/25  1153 06/16/25  1847 06/17/25  0111   PTP 15.6* 16.1*  --  14.2  --   --   --    INR 1.23* 1.27*  --  1.09  --   --   --    PTT 24.0  --    < > 36.2* 32.6 34.6 41.6*    < > = values in this interval not displayed.     Recent Labs   Lab 06/17/25  0902   ABGPHT 7.48*   [id number] 50*   KPCNI6Q 117*   ABGHCO3 34.2*   ABGBE 11.9*   TEMP 98.6   DUNG Positive   SITE Right Radial   DEV Nasal cannula   THGB 12.5*         Cultures: Blood cultures remain negative sputum with normal respiratory carmen    Radiology:  XR CHEST AP PORTABLE  (CPT=71045)  Result Date: 6/17/2025  CONCLUSION:  Cardiomegaly with mild interstitial edema.   LOCATION:  [facility]      Dictated by (CST): Anibal Aguilar MD on 6/17/2025 at 7:20 AM     Finalized by (CST): Anibal Aguilar MD on 6/17/2025 at 7:21 AM       Chest x-ray is reviewed overall gradual clearing of bilateral left greater than right atelectasis  Remains with pulmonary vascular redistribution      Medications reviewed     Assessment and Plan:   Problem List[1]    Assessment:    Acute hypoxic/hypercapnic respiratory failure/mechanical ventilation-now extubated to nasal cannula I now observation  Status post PEA arrest --possible anaphylaxis to Definity --completed targeted temperature protocol now awake and alert and hemodynamically stable  Suspected acute on chronic hypercapnic respiratory failure-bicarb in February elevated-compatible with EL/OHS-reports compliance at home with initial study 2010-repeat with AHI 38 point--remains with mild hypercapnia  Atrial fibrillation with RVR history of ablation: Rate controlled  Non-STEMI type II demand ischemia  Severe obesity  ROSEMARY-resolved  Pulmonary infiltrate possible aspiration pneumonia plans to complete course- x-ray  improving           Plan:  Wean FiO2 to keep oxygen saturation between 90% 92%  Following serial ABGs  Continue use Unasyn 3 g IV every 6 hours  Ongoing steroid wean  Resume nocturnal CPAP follow-up for need to accelerate-to get ptgpecub-bjfskbzov-wmroij titration study once recovered for possible role of BiPAP  Reviewed with patient's wife and brother at bedside      CC     Rabia Pierre MD  6/17/2025  12:38 PM         [1]   Patient Active Problem List  Diagnosis    Cardiac arrest (HCC)    NSTEMI (non-ST elevated myocardial infarction) (HCC)    Atrial fibrillation (HCC)    Morbid obesity (HCC)    ROSEMARY (acute kidney injury)    Acute respiratory failure with hypoxia (HCC)

## 2025-06-17 NOTE — OCCUPATIONAL THERAPY NOTE
OCCUPATIONAL THERAPY EVALUATION - INPATIENT     Room Number: 467/467-A  Evaluation Date: 6/17/2025  Type of Evaluation: Initial  Presenting Problem: PEA arrest    Physician Order: IP Consult to Occupational Therapy  Reason for Therapy: ADL/IADL Dysfunction and Discharge Planning    Precautions: Bed/chair alarm  Fall Risk: High fall risk       OCCUPATIONAL THERAPY ASSESSMENT   Patient is currently functioning below baseline with self-care and functional mobility.   Prior to admission, patient's baseline is independent.    Patient is requiring moderate assistance as a result of the following impairments: decreased functional strength, decreased functional reach, decreased endurance, pain, impaired   balance, and increased O2 needs from baseline.   Occupational Therapy will continue to follow for duration of hospitalization.    Patient will benefit from continued skilled OT Services at discharge to promote prior level of function.  Anticipate patient will return home with home health OT      History Related to Current Admission: Patient is a 60 year old male admitted on 6/13/2025 with Presenting Problem: PEA arrest. Co-Morbidities : a-fib    EVALUATION SESSION  FUNCTIONAL TRANSFER ASSESSMENT  Sit to Stand: Edge of Bed; Chair  Edge of Bed: Minimal Assist  Chair: Minimal Assist    BED MOBILITY     BALANCE ASSESSMENT  Static Sitting: Supervision  Static Standing: Contact Guard Assist    FUNCTIONAL ADL ASSESSMENT  Eating: Modified Independent  Grooming Seated: Supervision  LB Dressing Seated: Moderate Assist  Toileting Seated: Moderate Assist    ACTIVITY TOLERANCE: WFL   HR69  Heart Rate Source: Monitor         BP: 162/109  BP Method: Automatic  Patient Position: Sitting    O2 SATURATIONS  Oxygen Therapy  SPO2% on Oxygen at Rest: 94  At rest oxygen flow (liters per minute): 6    COGNITION  Arousal/Alertness:  appropriate responses to stimuli  Attention Span:  attends with cues to redirect  Orientation Level:  oriented  x4  Memory:  impaired working memory and decreased recall of recent events  Following Commands:  follows one step commands with increased time and follows one step commands with repetition  Initiation: appears intact  Awareness of Errors:  good awareness of errors made      Upper extremity  BUE AROM and Strength WFL     EDUCATION PROVIDED  Patient Education : Role of Occupational Therapy; Plan of Care; Discharge Recommendations; Functional Transfer Techniques; Fall Prevention; DME Recommendations; Posture/Positioning; Edema Reduction; Energy Conservation; Proper Body Mechanics  Patient's Response to Education: Verbalized Understanding; Requires Further Education  Family/Caregiver Education : Role of Occupational Therapy; Plan of Care; Discharge Recommendations; Functional Transfer Techniques; Fall Prevention; Posture/Positioning; Edema Reduction; Energy Conservation  Family/Caregiver's Response to Education: Verbalized Understanding; Returned Demonstration      Equipment used: RW    Therapist comments:   SHORT BLESSED TEST of Attention and Memory (SBT) was administered.  Patient scored 2 on this screening test.   In the original validation sample for the SBT (Jennifer et al., 1983), 90% of normal scores 6 points or less. Scores of 7 or higher would indicate a need for further evaluation to rule out a dementing disorder, such as Alzheimer’s disease.    Patient was transferred into chair mode in specialty bariatric bed.  Pt was able to stand from chair mode w/ min A.    BUE support on walker and CGA while in static standing. Patient required min A to take a few steps to bedside chair.  Patient reports difficulty with laying flat d/t pain in chest post compressions.    Patient End of Session: Up in chair, Needs met, Call light within reach, RN aware of session/findings, All patient questions and concerns addressed, Alarm set, Discussed recommendations with /, With  staff, Hospital anti-slip  socks, Family present    OCCUPATIONAL PROFILE    HOME SITUATION  Type of Home: House  Home Layout: Two level, Able to live on main level  Lives With: Spouse    Toilet and Equipment: Standard height toilet  Shower/Tub and Equipment: Walk-in shower  Other Equipment: None    Occupation/Status:   Hand Dominance: Right  Drives: Yes  Patient Regularly Uses: Glasses    Prior Level of Function: independent with all ADL/IADLs without use of adaptive device.  Patient's job requires frequent travel.  Patient's family is supportive and will be available to assist prn.    SUBJECTIVE   PAIN ASSESSMENT  Rating: Unable to rate  Location: chest d/t compressions  Management Techniques: Nurse notified, Body mechanics, Breathing techniques, Relaxation, Repositioning    OBJECTIVE  Precautions: Bed/chair alarm  Fall Risk: High fall risk    WEIGHT BEARING RESTRICTION       ASSESSMENTS    AM-PAC '6-Clicks' Inpatient Daily Activity Short Form  -   Putting on and taking off regular lower body clothing?: A Lot  -   Bathing (including washing, rinsing, drying)?: A Lot  -   Toileting, which includes using toilet, bedpan or urinal? : A Lot  -   Putting on and taking off regular upper body clothing?: A Little  -   Taking care of personal grooming such as brushing teeth?: None  -   Eating meals?: None    AM-PAC Score:  Score: 17  Approx Degree of Impairment: 50.11%  Standardized Score (AM-PAC Scale): 37.26    PLAN  OT Device Recommendations: TBD  OT Treatment Plan: Balance activities, Energy conservation/work simplification techniques, ADL training, Functional transfer training, UE strengthening/ROM, Endurance training, Patient/Family education, Patient/Family training, Cognitive reorientation, Equipment eval/education, Compensatory technique education  Rehab Potential : Good  Frequency: 3-5x/week  Number of Visits to Meet Established Goals: 7    ADL Goals  Patient will perform toileting with supervision and AE PRN  Patient will perform  LB dressing with supervision and AE PRN    Functional Transfer Goals  Patient will perform bed mobility supine to sit with supervision  Patient will perform bed mobility sit to supine with supervision  Patient will perform toilet transfer with supervision    Patient Evaluation Complexity Level:   Occupational Profile/Medical History MODERATE - Expanded review of history including review of medical or therapy record   Specific performance deficits impacting engagement in ADL/IADL MODERATE  3 - 5 performance deficits   Client Assessment/Performance Deficits LOW - No comorbidities nor modifications of tasks    Clinical Decision Making LOW - Analysis of occupational profile, problem-focused assessments, limited treatment options    Overall Complexity LOW     OT Session Time: 40 minutes  Self-Care Home Management: 15 minutes  Therapeutic Activities: 15 minutes

## 2025-06-17 NOTE — PROGRESS NOTES
CRITICAL CARE PROGRESS NOTE    Patient Name: Josep Frost  : 1965  MRN: [id number]  Admit Date: 2025  Length of Stay: 4 Days    Subjective/24h events: Patient extubated yesterday looks well on amiodarone drip but appears to be in sinus  Will add metoprolol home dose  Switch to rivaroxaban discontinue heparin    Assessment and Plan: 60-year-old male status post cardiac arrest possible anaphylactic reaction versus air embolism    Neuro/Psych: Anoxic encephalopathy appears for the most part resolved some short-term memory deficits  Aggressive PT OT    Cardiovascular: Cardiac arrest presumably secondary to anaphylactic reaction in the setting of likely pickwickian/EL causing hypoxia and then cardiac arrest versus air embolism??  Echo reviewed fairly benign  No further shock  Off vasopressors  Chest pain reproducible likely secondary to occult rib fractures        Known history of A-fib with ablation on amiodarone now in sinus rhythm will restart home metoprolol  Heparin drip to home Xarelto  On amiodarone appreciate cardiology recommendation with respect to transition to sotalol or continue with amiodarone previously patient had some issues with thyroid therefore would like to discontinue  Aggressive potassium replacement    Pulmonary: Hypoxemic respiratory failure extubated yesterday continue CPAP at night  Still with mild dyspnea Lasix should help continue home dose    GI: No ischemic hepatopathy that is pertinent  Discontinue GI prophylaxis unless on it at home  Cardiac diet    Renal/Metabolic: Acute kidney injury improving continue Lasix 40 twice daily    Heme: No significant anemia thrombocytopenia or coagulopathy    ID: Leukocytosis resolved culture data unrevealing  Unasyn for 5 days likely overkill      Endocrine: No known history of endocrinopathy  Sugars elevated will up sliding scale likely secondary to steroids    Steroids to and shortly      ICU checklist  Feeding: Cardiac  diet  Analgesia: Precedex  Sedation:   Thromboembolism PPX: Heparin drip to Xarelto  Stress Ulcer PPX: PPI  Glucose control: Sliding scale  Bowel Regimen:   Lines/drains/tubes:   Deescalation of antibiotics: Unasyn 5 days  Therapies:PT/OT/ST when appropriate    Full code                  Hemodynamics  24h Vitals:  VitalLast Value (24 Hour)24 Hour Range  Ckbj196.4 °F (38 °C)Temp  Min: 96.4 °F (35.8 °C)  Max: 100.4 °F (38 °C)  [id number]  Min: 38  Max: 141  BP (Non-Invasive)104/59 BP  Min: 73/31  Max: 184/145  BP (A-Line)91/66AO  Min: 76/68  Max: 117/72  CVP  could not be evaluated. This SmartLink does not work with rows of the type:   [id number]  Min: 13  Max: 43  [id number] %SpO2  Min: 76 %  Max: 100 %  O2 Therapy

## 2025-06-17 NOTE — DIETARY NOTE
[facility]   part of [facility]  NUTRITION ASSESSMENT    Pt does not meet malnutrition criteria at this time.    NUTRITION INTERVENTION:    Meal and Snacks - Change to 2gm Na diet as tolerated; monitor and encourage po intake.  Medical Nutrition Supplements - RD added Ensure Plus HP vanilla BID.  Vitamin and Mineral Supplementation - Recommend daily multivitamin.    PATIENT STATUS: 6/17: Extubated yesterday. Visited pt at bedside. Pt reports good appetite/PO intake at baseline but reports appetite today isn't too good and having chest pain with eating (likely from CPR). Denies nausea, vomiting and constipation but reports having diarrhea which is new. No chewing or swallowing difficulties and NKFA. Reports his wt has been uptrending recently which is partially related to fluid but also reports being less active. Pt interested in outpatient RD follow up - provided phone number for scheduling in AVS. Discussed importance of nutrition especially protein during acute hospitalization and pt agreeable to vanilla Ensure BID. All questions answered at this time.    6/15: 60 year old male admitted on 6/13 presents s/p PEA arrest during echocardiogram. Pt intubated upon arrival. Transferred to ICU and started on pressors. Pt screened d/t consult for tube feeds. Pt is currently intubated, sedated on propofol, requiring low dose pressors, NPO with OGT to LIS. Will provide TF recs above and monitor tolerance.    PMH: Afib    ANTHROPOMETRICS:  Ht: 190.5 cm (6' 3\")  Wt: (!) 197.4 kg (435 lb 3 oz).   BMI: Body mass index is 54.39 kg/m².  IBW: 89.1 kg    WEIGHT HISTORY:   Patient Weight(s) for the past 336 hrs:   Weight   06/17/25 0100 (!) 197.4 kg (435 lb 3 oz)   06/16/25 0400 (!) 186.7 kg (411 lb 9.6 oz)   06/15/25 0400 (!) 184 kg (405 lb 10.3 oz)   06/13/25 2045 (!) 183 kg (403 lb 7.1 oz)       Wt Readings from Last 10 Encounters:   06/17/25 (!) 197.4 kg (435 lb 3 oz)   05/09/25 (!) 172.4 kg (380 lb)   04/04/25 (!)  172.4 kg (380 lb)        NUTRITION:  Diet:   No orders of the defined types were placed in this encounter.     Food Allergies: No  Cultural/Ethnic/Roman Catholic Preferences Addressed: Yes    Percent Meals Eaten (last 3 days)       None            GI SYSTEM REVIEW: WNL; last BM 6/16  Skin/Wounds: WNL    NUTRITION RELATED PHYSICAL FINDINGS:     1. Body Fat/Muscle Mass: obese     2. Fluid Accumulation: upper extremity edema non-pitting, hand edema non-pitting, lower extremity edema 2+, and foot edema 2+    NUTRITION PRESCRIPTION: 89.1 kg IBW  Calories: 0119-4620 calories/day (25-30 kcal/kg)  Protein: 134-178 grams protein/day (1.5-2.0 gm/kg)  Fluid: ~1 ml/kcal or per MD discretion    NUTRITION DIAGNOSIS/PROBLEM:  Inadequate oral intake related to insufficient appetite resulting in inadequate nutrition intake as evidenced by documented/reported insufficient oral intake    MONITOR AND EVALUATE/NUTRITION GOALS:  PO intake of 75% of meals TID - New  PO intake of 75% of oral nutrition supplement/s - New  Weight stable within 1 to 2 lbs during admission - Ongoing  Start alternative nutrition in 24-48 hrs if diet is not able to advance- Resolved      MEDICATIONS:  Abx, lasix, solucortef, novolog    LABS:  , K+ 3.4, POC glucose x 24hrs: 131-172 mg/dl, A1c = 6%    Pt is at Moderate nutrition risk    Brooklyn Muhammad RD, LDN, [facility]  Clinical Dietitian  Spectra: [phone]

## 2025-06-18 LAB
ALBUMIN SERPL-MCNC: 4.3 G/DL (ref 3.2–4.8)
ALBUMIN/GLOB SERPL: 1.8 {RATIO} (ref 1–2)
ALP LIVER SERPL-CCNC: 78 U/L (ref 45–117)
ALT SERPL-CCNC: 30 U/L (ref 10–49)
ANION GAP SERPL CALC-SCNC: 10 MMOL/L (ref 0–18)
ANION GAP SERPL CALC-SCNC: 8 MMOL/L (ref 0–18)
ANION GAP SERPL CALC-SCNC: 9 MMOL/L (ref 0–18)
AST SERPL-CCNC: 40 U/L (ref ?–34)
ATRIAL RATE: 63 BPM
BASOPHILS # BLD AUTO: 0.07 X10(3) UL (ref 0–0.2)
BASOPHILS NFR BLD AUTO: 0.8 %
BILIRUB SERPL-MCNC: 0.6 MG/DL (ref 0.2–1.1)
BUN BLD-MCNC: 27 MG/DL (ref 9–23)
BUN BLD-MCNC: 27 MG/DL (ref 9–23)
BUN BLD-MCNC: 28 MG/DL (ref 9–23)
CALCIUM BLD-MCNC: 9.1 MG/DL (ref 8.7–10.6)
CHLORIDE SERPL-SCNC: 100 MMOL/L (ref 98–112)
CHLORIDE SERPL-SCNC: 100 MMOL/L (ref 98–112)
CHLORIDE SERPL-SCNC: 98 MMOL/L (ref 98–112)
CO2 SERPL-SCNC: 36 MMOL/L (ref 21–32)
CREAT BLD-MCNC: 1.23 MG/DL (ref 0.7–1.3)
CREAT BLD-MCNC: 1.23 MG/DL (ref 0.7–1.3)
CREAT BLD-MCNC: 1.28 MG/DL (ref 0.7–1.3)
EGFRCR SERPLBLD CKD-EPI 2021: 64 ML/MIN/1.73M2 (ref 60–?)
EGFRCR SERPLBLD CKD-EPI 2021: 67 ML/MIN/1.73M2 (ref 60–?)
EGFRCR SERPLBLD CKD-EPI 2021: 67 ML/MIN/1.73M2 (ref 60–?)
EOSINOPHIL # BLD AUTO: 0.06 X10(3) UL (ref 0–0.7)
EOSINOPHIL NFR BLD AUTO: 0.6 %
ERYTHROCYTE [DISTWIDTH] IN BLOOD BY AUTOMATED COUNT: 13.3 %
GLOBULIN PLAS-MCNC: 2.4 G/DL (ref 2–3.5)
GLUCOSE BLD-MCNC: 104 MG/DL (ref 70–99)
GLUCOSE BLD-MCNC: 104 MG/DL (ref 70–99)
GLUCOSE BLD-MCNC: 105 MG/DL (ref 70–99)
GLUCOSE BLD-MCNC: 107 MG/DL (ref 70–99)
GLUCOSE BLD-MCNC: 114 MG/DL (ref 70–99)
GLUCOSE BLD-MCNC: 124 MG/DL (ref 70–99)
GLUCOSE BLD-MCNC: 98 MG/DL (ref 70–99)
HCT VFR BLD AUTO: 36.4 % (ref 39–53)
HGB BLD-MCNC: 12.1 G/DL (ref 13–17.5)
IMM GRANULOCYTES # BLD AUTO: 0.44 X10(3) UL (ref 0–1)
IMM GRANULOCYTES NFR BLD: 4.8 %
LYMPHOCYTES # BLD AUTO: 1.03 X10(3) UL (ref 1–4)
LYMPHOCYTES NFR BLD AUTO: 11.1 %
MAGNESIUM SERPL-MCNC: 2.1 MG/DL (ref 1.6–2.6)
MAGNESIUM SERPL-MCNC: 2.2 MG/DL (ref 1.6–2.6)
MCH RBC QN AUTO: 30.6 PG (ref 26–34)
MCHC RBC AUTO-ENTMCNC: 33.2 G/DL (ref 31–37)
MCV RBC AUTO: 91.9 FL (ref 80–100)
MONOCYTES # BLD AUTO: 0.64 X10(3) UL (ref 0.1–1)
MONOCYTES NFR BLD AUTO: 6.9 %
NEUTROPHILS # BLD AUTO: 7.02 X10 (3) UL (ref 1.5–7.7)
NEUTROPHILS # BLD AUTO: 7.02 X10(3) UL (ref 1.5–7.7)
NEUTROPHILS NFR BLD AUTO: 75.8 %
OSMOLALITY SERPL CALC.SUM OF ELEC: 303 MOSM/KG (ref 275–295)
OSMOLALITY SERPL CALC.SUM OF ELEC: 303 MOSM/KG (ref 275–295)
OSMOLALITY SERPL CALC.SUM OF ELEC: 306 MOSM/KG (ref 275–295)
P AXIS: 80 DEGREES
P-R INTERVAL: 178 MS
PLATELET # BLD AUTO: 213 10(3)UL (ref 150–450)
POTASSIUM SERPL-SCNC: 3.4 MMOL/L (ref 3.5–5.1)
POTASSIUM SERPL-SCNC: 3.8 MMOL/L (ref 3.5–5.1)
PROT SERPL-MCNC: 6.7 G/DL (ref 5.7–8.2)
Q-T INTERVAL: 466 MS
QRS DURATION: 142 MS
QTC CALCULATION (BEZET): 476 MS
R AXIS: 87 DEGREES
RBC # BLD AUTO: 3.96 X10(6)UL (ref 4.3–5.7)
SODIUM SERPL-SCNC: 144 MMOL/L (ref 136–145)
SODIUM SERPL-SCNC: 144 MMOL/L (ref 136–145)
SODIUM SERPL-SCNC: 145 MMOL/L (ref 136–145)
T AXIS: 62 DEGREES
VENTRICULAR RATE: 63 BPM
WBC # BLD AUTO: 9.3 X10(3) UL (ref 4–11)

## 2025-06-18 PROCEDURE — 99232 SBSQ HOSP IP/OBS MODERATE 35: CPT | Performed by: HOSPITALIST

## 2025-06-18 PROCEDURE — 99232 SBSQ HOSP IP/OBS MODERATE 35: CPT

## 2025-06-18 PROCEDURE — 99232 SBSQ HOSP IP/OBS MODERATE 35: CPT | Performed by: INTERNAL MEDICINE

## 2025-06-18 RX ORDER — IBUPROFEN 400 MG/1
400 TABLET, FILM COATED ORAL EVERY 6 HOURS PRN
Status: DISCONTINUED | OUTPATIENT
Start: 2025-06-18 | End: 2025-06-22

## 2025-06-18 NOTE — PROGRESS NOTES
Progress Note  Josep Willy Frost Patient Status:  Inpatient    1965 MRN [id number]   [facility] 4SW-A Attending Glendy Hayden MD   Hosp Day # 5 PCP NEELIMA JEFFERSON     Subjective:  Chest soreness but feels otherwise okay. On 2-3L NC    Objective:  /70   Pulse 59   Temp 98.2 °F (36.8 °C) (Temporal)   Resp 17   Ht 6' 3\" (1.905 m)   Wt (!) 414 lb 7.4 oz (188 kg)   SpO2 99%   BMI 51.80 kg/m²     Telemetry: NSR, 1st deg AVB, RBBB    Intake/Output:    Intake/Output Summary (Last 24 hours) at 2025 1025  Last data filed at 2025 0942  Gross per 24 hour   Intake 1052 ml   Output 5450 ml   Net -4398 ml       Last 3 Weights   25 0000 (!) 414 lb 7.4 oz (188 kg)   25 0100 (!) 435 lb 3 oz (197.4 kg)   25 0400 (!) 411 lb 9.6 oz (186.7 kg)   06/15/25 0400 (!) 405 lb 10.3 oz (184 kg)   25 2045 (!) 403 lb 7.1 oz (183 kg)   25 1512 (!) 380 lb (172.4 kg)   25 1207 (!) 380 lb (172.4 kg)       Labs:  Recent Labs   Lab 25  0639 25  1912 25  0444   * 155* 104*  104*   BUN 29* 28* 27*  28*   CREATSERUM 1.23 1.22 1.23  1.23   EGFRCR 67 68 67  67   CA 8.9 8.6* 9.1  9.1    143 144  145   K 3.4* 3.2* 3.4*  3.4*  3.4*   CL 99 98 100  100   CO2 34.0* 35.0* 36.0*  36.0*     Recent Labs   Lab 25  0408 25  0639 25  0823   RBC 3.71* 3.60* 3.96*   HGB 11.2* 11.1* 12.1*   HCT 32.9* 32.1* 36.4*   MCV 88.7 89.2 91.9   MCH 30.2 30.8 30.6   MCHC 34.0 34.6 33.2   RDW 13.6 13.6 13.3   NEPRELIM 8.95* 8.87* 7.02   WBC 10.7 11.2* 9.3   .0 205.0 213.0         Recent Labs   Lab 25  1656 25  0419   TROPHS 105* 1,120* 786*       Diagnostics:  No results found.     Review of Systems   Cardiovascular:  Positive for dyspnea on exertion and leg swelling.   Respiratory:  Positive for shortness of breath.    Musculoskeletal:  Positive for myalgias.       Physical Exam:    Gen: alert, oriented x 3,  NAD  Heent: pupils equal, reactive. Mucous membranes moist.   Neck: no jvd  Cardiac: regular rate and rhythm, normal S1,S2, no murmur, clicks, rub or gallop  Lungs: diminished  Abd: distended +bs  Ext: generalized edema  Skin: Warm, dry  Neuro: No focal deficits      Medications:    Scheduled Medications[1]  Medication Infusions[2]      Assessment:  Pt is a 60 year old male who presented to [facility] clinic for echo; soon after receiving Definity contrast, became SOB, hypoxic, PEA arrest, then asystolic. ROSC achieved after 10 mins CPR  Cardiopulmonary Arrest, PEA then Asystole    ? Anaphylactic Reaction - Hypoxia, arrest s/p Definity w/echo  Required multiple rounds of CPR  Echo w/LVEF 55-60%, no WMA  Acute Hypoxic/Hypercapneic Respiratory Failure, Poss Aspiration PNA  Now extubated - on 8L  Pulm following  Elevated Troponin, Type II  Trop 105 > 1120 > 786 - likely supply/demand d/t above  Volume Overload, Iatrogenic  Diuresis w/IV lasix, excellent u/o thus far  Net + 3.1 L since admit  Paroxysmal Atrial Fibrillation/Flutter  Hx Prior AFib/flutter Ablation 4/2025, DCCV 5/2025  Initially w/brief PAF episodes on IV amiodarone - now stopped  Maintaining NSR - on BB  TSH WNL  Hx on flecainide prior to admit  [id number] 1 (HTN) - on xarelto 20mg daily  ROSEMARY - Cr improving w/diuresis   Morbid Obesity  EL- hx CPAP    Plan:  Maintaining NSR - continue toprol 50mg po BID. No flecainide due to recent IV amio. Can restart outpatient in ~1 month.  Continue xarelto for stroke prophylaxis  Pt remains volume overloaded - continue IV Lasix  Overall doing very well - PT/OT, encourage IS    Plan of care discussed with patient, RN.    Bill Reinoso MD  Cardiac Electrophysiology  [facility]         [1]    potassium chloride  40 mEq Intravenous Once    metoprolol succinate ER  50 mg Oral BID    rivaroxaban  20 mg Oral Daily with food    multivitamin  1 tablet Oral Daily    furosemide  60 mg Intravenous BID (Diuretic)     insulin aspart  2-10 Units Subcutaneous TID CC and HS    ampicillin-sulbactam  3 g Intravenous Q6H   [2]    dextrose 10%

## 2025-06-18 NOTE — PROGRESS NOTES
Progress Note  Josep Willy Frost Patient Status:  Inpatient    1965 MRN [id number]   Location [facility] 4SW-A Attending Glendy Hayden MD   Hosp Day # 5 PCP NEELIMA JEFFERSON     Subjective:  Pt sitting up in chair; chest soreness is improving    Objective:  /70   Pulse 59   Temp 98.2 °F (36.8 °C) (Temporal)   Resp 17   Ht 6' 3\" (1.905 m)   Wt (!) 414 lb 7.4 oz (188 kg)   SpO2 99%   BMI 51.80 kg/m²     Telemetry: SR, occ PAC's    Intake/Output:    Intake/Output Summary (Last 24 hours) at 2025 1007  Last data filed at 2025 0942  Gross per 24 hour   Intake 1052 ml   Output 5450 ml   Net -4398 ml       Last 3 Weights   25 0000 (!) 414 lb 7.4 oz (188 kg)   25 0100 (!) 435 lb 3 oz (197.4 kg)   25 0400 (!) 411 lb 9.6 oz (186.7 kg)   06/15/25 0400 (!) 405 lb 10.3 oz (184 kg)   25 2045 (!) 403 lb 7.1 oz (183 kg)   25 1512 (!) 380 lb (172.4 kg)   25 1207 (!) 380 lb (172.4 kg)       Labs:  Recent Labs   Lab 25  0639 25  1912 25  0444   * 155* 104*  104*   BUN 29* 28* 27*  28*   CREATSERUM 1.23 1.22 1.23  1.23   EGFRCR 67 68 67  67   CA 8.9 8.6* 9.1  9.1    143 144  145   K 3.4* 3.2* 3.4*  3.4*  3.4*   CL 99 98 100  100   CO2 34.0* 35.0* 36.0*  36.0*     Recent Labs   Lab 25  0408 25  0639 25  0823   RBC 3.71* 3.60* 3.96*   HGB 11.2* 11.1* 12.1*   HCT 32.9* 32.1* 36.4*   MCV 88.7 89.2 91.9   MCH 30.2 30.8 30.6   MCHC 34.0 34.6 33.2   RDW 13.6 13.6 13.3   NEPRELIM 8.95* 8.87* 7.02   WBC 10.7 11.2* 9.3   .0 205.0 213.0         Recent Labs   Lab 25  1656 25  0419   TROPHS 105* 1,120* 786*       Diagnostics:  No results found.   Review of Systems   Cardiovascular:  Positive for dyspnea on exertion and leg swelling. Negative for chest pain.   Respiratory:  Positive for shortness of breath. Negative for cough.        Physical Exam:    Gen: alert, oriented x 3, NAD  Heent:  pupils equal, reactive. Mucous membranes moist.   Neck: VANIA  jvd d/t habitus  Cardiac: regular rate and rhythm, normal S1,S2, no murmur, clicks, rub or gallop  Lungs: diminished  Abd: distended, +bs  Ext: BLE + pitting edema  Skin: Warm, dry  Neuro: No focal deficits      Medications:    Scheduled Medications[1]  Medication Infusions[2]      Assessment:  Pt is a 60 year old male who presented to [facility] for echo; soon after receiving Definity contrast, became SOB, hypoxic, PEA arrest, then asystolic. ROSC achieved after 10 mins CPR  Cardiopulmonary Arrest, PEA then Asystole    ? Anaphylactic Reaction - Hypoxia, arrest s/p Definity w/echo  Required multiple rounds of CPR  Acute Hypoxic/Hypercapneic Respiratory Failure, Poss Aspiration PNA  Now extubated - decreased O2 requirements  On antibx  [facility]AP at [facility] following  Elevated Troponin, Type II  Trop 105 > 1120 > 786 - likely supply/demand d/t above  Pt reports prior stress test w/normal perfusion 2021  Volume Overload, Acute on Chronic HFpEF, Partly Iatrogenic  Diuresis w/IV lasix, excellent u/o thus far  Now net - 871ml   Echo w/LVEF 55-60%, no WMA  Hx on lasix 40mg po BID   Unsure of Dry wt   Paroxysmal Atrial Fibrillation/Flutter  Hx Prior AFib/flutter Ablation 4/2025, DCCV 5/2025  Initially w/brief PAF episodes on IV amiodarone - now stopped  Hx on flecainide prior to admit  Maintaining NSR - on BB  TSH WNL  [id number] 1 (HTN) - on xarelto 20mg daily  ROSEMARY - Cr improved w/diuresis   Morbid Obesity  EL - CPAP    Plan:  Maintaining NSR - continue toprol 50mg po BID  Hx on flecainide prior to admit - will discuss with EP. QTc improved on 6/17 12 lead.   Continue xarelto for stroke prophylaxis  Diuresis w/IV lasix - excellent u/o thus far. Still volume overloaded  PT/OT, encourage IS    Plan of care discussed with patient, RN.    Georgina Martinez, OLIMPIA  6/18/2025  10:07 AM             [1]    potassium chloride  40 mEq Intravenous Once    metoprolol succinate ER  50  mg Oral BID    rivaroxaban  20 mg Oral Daily with food    multivitamin  1 tablet Oral Daily    furosemide  60 mg Intravenous BID (Diuretic)    insulin aspart  2-10 Units Subcutaneous TID CC and HS    ampicillin-sulbactam  3 g Intravenous Q6H   [2]    dextrose 10%

## 2025-06-18 NOTE — PROGRESS NOTES
[facility]  Progress Note    Josep Frost Patient Status:  Inpatient    1965 MRN [id number]   [facility] 4SW-A Attending Glendy Hayden MD   Hosp Day # 5 PCP NEELIMA JEFFERSON     Subjective:  Josep Frost is a(n) 60 year old male temp 99.9  Continues to feel much better continues to improve feels stronger  Remains on 2 to 4 L O2  Remains on CPAP at night    Objective:  /70   Pulse 59   Temp 98.2 °F (36.8 °C) (Temporal)   Resp 17   Ht 6' 3\" (1.905 m)   Wt (!) 414 lb 7.4 oz (188 kg)   SpO2 99%   BMI 51.80 kg/m² currently on O2      Temp (24hrs), Av.8 °F (37.1 °C), Min:98.2 °F (36.8 °C), Max:99.9 °F (37.7 °C)      Intake/Output:    Intake/Output Summary (Last 24 hours) at 2025 1300  Last data filed at 2025 0942  Gross per 24 hour   Intake 952 ml   Output 5450 ml   Net -4498 ml       Physical Exam:   General: alert, cooperative, oriented.  No respiratory distress.   Head: Normocephalic, without obvious abnormality, atraumatic.   Throat: Lips, mucosa, and tongue normal.  No thrush noted.   Neck: trachea midline, no adenopathy, no thyromegaly. No JVD.   Lungs: Much better excursion slight basilar rales   Chest wall: No tenderness or deformity.   Heart: Seems regular sinus at this point with PACs   Abdomen: soft, non-distended, no masses, no guarding, no     Rebound.  Morbidly obese   Extremity: Ongoing edema bilaterally seems softer   Skin: No rashes or lesions.   Neurological: Alert, interactive, no focal deficits    Lab Data Review:  Recent Labs     25  0408 25  0639 25  0823   WBC 10.7 11.2* 9.3   HGB 11.2* 11.1* 12.1*   .0 205.0 213.0     Recent Labs     25  0408 25  1643 25  0639 25  1912 25  0444    141 143 143 144  145   K 3.9 3.5 3.4* 3.2* 3.4*  3.4*  3.4*   CL 99 98 99 98 100  100   CO2 30.0 34.0* 34.0* 35.0* 36.0*  36.0*   BUN 36* 33* 29* 28* 27*  28*   CREATSERUM 1.40* 1.24 1.23 1.22  1.23  1.23     Recent Labs   Lab 06/14/25  0419 06/15/25  0159 06/15/25  0840 06/16/25  0408 06/16/25  1153 06/16/25  1847 06/17/25  0111   PTP 15.6* 16.1*  --  14.2  --   --   --    INR 1.23* 1.27*  --  1.09  --   --   --    PTT 24.0  --    < > 36.2* 32.6 34.6 41.6*    < > = values in this interval not displayed.       Cultures: neg BC   Sputum NRF     Radiology:  No results found.  Reviewed       Medications reviewed     Assessment and Plan:   Problem List[1]    Assessment:  Acute hypoxic/hypercapnic respiratory failure/mechanical ventilation-now extubated to nasal cannula   Status post PEA arrest --suspected anaphylaxis to Definity --completed targeted temperature protocol now awake and alert and hemodynamically stable  Suspected acute on chronic hypercapnic respiratory failure-bicarb in February elevated-compatible with EL/OHS-reports compliance at home with initial study 2010-repeat with AHI 38 point--remains with mild hypercapnia-download today on CWP 15 with AHI of 1.1-uses Aneumed  Atrial fibrillation with RVR history of ablation: Rate controlled  Non-STEMI type II demand ischemia  Severe obesity  ROSEMARY-resolved  Pulmonary infiltrate possible aspiration pneumonia plans to complete course- x-ray improving           Plan:  Wean FiO2 to keep oxygen saturation between 90% 92%  Continue use Unasyn 3 g IV every 6 hours  Completed steroids  Resume nocturnal CPAP follow-up for need to accelerate-to get clsektgh-[id number]-[id number] titration study once recovered for possible role of BiPAP-recheck ABG  Reviewed with patient and family at bedside    CC     Rabia Pierre MD  6/18/2025  1:00 PM         [1]   Patient Active Problem List  Diagnosis    Cardiac arrest (HCC)    NSTEMI (non-ST elevated myocardial infarction) (HCC)    Atrial fibrillation (HCC)    Morbid obesity (HCC)    ROSEMARY (acute kidney injury)    Acute respiratory failure with hypoxia (HCC)    EL (obstructive sleep apnea)

## 2025-06-18 NOTE — RESPIRATORY THERAPY NOTE
EL - Equipment Use Daily Summary:                  . Set Mode: CPAP                . Usage in hours: 7.5                . 90% Pressure (EPAP) level: 15                . 90% Insp.Pressure (IPAP):                . AHI: 1.1                . Supplemental Oxygen:    LPM                . Comments:

## 2025-06-18 NOTE — PROGRESS NOTES
Josep Frost Patient Status:  Inpatient    1965 MRN [id number]   Location [facility] 4SW-A Attending Glendy Hayden MD   Hosp Day # 5 PCP NEELIMA JEFFERSON     Critical Care Progress Note    Subjective:  Sitting up on the side of the bed. Reports persistent sore chest discomfort.    Objective:    Medications:  Scheduled Medications[1]        Intake/Output Summary (Last 24 hours) at 2025 0715  Last data filed at 2025 0643  Gross per 24 hour   Intake 624.24 ml   Output 3200 ml   Net -2575.76 ml       BP (!) 105/93   Pulse 67   Temp 99.9 °F (37.7 °C)   Resp 24   Ht 190.5 cm (6' 3\")   Wt (!) 435 lb 3 oz (197.4 kg)   SpO2 98%   BMI 54.39 kg/m²     Physical Exam:  General: No acute distress  Neuro: AOx4, non-focal   Resp: Clear/diminished posterior bilaterally  CV:  RRR - 60's on bedside monitor  Abd: Soft, non-tender, non-distended  Extremities: Warm, +1 pitting edema BLE, BLE PVD discoloration  Pulses: pedal +1 bilaterally    Recent Labs   Lab 25  0639   RBC 3.60*   HGB 11.1*   HCT 32.1*   MCV 89.2   MCH 30.8   MCHC 34.6   RDW 13.6   NEPRELIM 8.87*   WBC 11.2*   .0     Recent Labs   Lab 25  0408 25  1643 25  0639 25  1912 25  0444   *   < > 123* 155* 104*  104*   BUN 36*   < > 29* 28* 27*  28*   CREATSERUM 1.40*   < > 1.23 1.22 1.23  1.23   CA 8.7   < > 8.9 8.6* 9.1  9.1   ALB 3.6  --  3.8  --  4.3      < > 143 143 144  145   K 3.9   < > 3.4* 3.2* 3.4*  3.4*  3.4*   CL 99   < > 99 98 100  100   CO2 30.0   < > 34.0* 35.0* 36.0*  36.0*   ALKPHO 66  --  73  --  78   AST 31  --  37*  --  40*   ALT 25  --  26  --  30   BILT 0.4  --  0.5  --  0.6   TP 5.8  --  6.1  --  6.7    < > = values in this interval not displayed.     Recent Labs   Lab 25  0902   ABGPHT 7.48*   YBKAZQ0R 50*   AWNDH6B 117*   ABGHCO3 34.2*   ABGBE 11.9*   TEMP 98.6   DUNG Positive   SITE Right Radial   DEV Nasal cannula   THGB 12.5*     No results for  input(s): \"BNP\" in the last 168 hours.  No results for input(s): \"TROP\", \"CK\" in the last 168 hours.    No results found.       Assessment/Plan:    Josep Frost is a 60 year old male admitted to ICU after cardiac arrest.    Active problems:  Cardiac arrest 2/2 anaphylactic reaction vs air embolism?  Acute hypoxic/hypercapnic respiratory failure  Encephalopathy  ROSEMARY  Leukocytosis  Hyperglycemia    Historical problems:  Paroxysmal Afib/aflutter s/p ablation 5/13/25  Obesity  Pre-diabetic    Neuro/Psych: Acute encephalopathy/delirium improved. Continued sore chest pain s/p CPR w/ little relief from prn tylenol/morphine.  - add prn motrin as renal function has normalized  - continue delirium prevention measures    Cardiovascular: Hemodynamically stable. Reducible chest pain s/p CPR. TTE with limited views, though preserved EF, no tamponade physiology or significant valvular abnormalities.  - continue xarelto, BB, diuretics  - cardiology on consult    Pulmonary: No acute issues. Extubated 6/17. On 2L via NC.  - encourage IS/cough/deep breathing  - SpO2 goal >92%  - CPAP at night  - lasix for interstitial edema restart PTA PO dose per pulm  - pulmonology on consult     GI: No acute issues. Tolerating diet.     Renal/Metabolic:  Net -2L since admission. UOP 5.2L x24hrs. Creatinine improved to 1.23 today.  - electrolyte protocol    Heme: No acute issues. Hgb --> 12.1 today.    ID: Tmax: 99.9. Leukocytosis resolved.  - blood cultures NGTD  - sputum cx normal resp carmen/no organisms seen  - continue unasyn (6/13-)    Endocrine: Prediabetic- A1c 6.0. Hyperglycemia in setting of stress dose steroids (now stopped).   - continue ACHS accuchecks  - SSI    ICU checklist  Feeding: Regular/NA restricted  Analgesia: prn tylenol/morphine/motrin  Sedation: none  Thromboembolism PPX: none  Stress Ulcer PPX: none  Glucose control: SSI  Bowel Regimen: prn  Lines/drains/tubes: PIVs  Deescalation of antibiotics: unasyn  (6/13-)  Therapies:PT/OT/ST when appropriate  Dispo: transfer to CTU    Code Status: Full Code    Plan of care discussed with intensivist, Dr. Alex Frorester, [facility]  Critical Care  [id number]         [1]    potassium chloride  40 mEq Intravenous Once    metoprolol succinate ER  50 mg Oral BID    rivaroxaban  20 mg Oral Daily with food    multivitamin  1 tablet Oral Daily    furosemide  60 mg Intravenous BID (Diuretic)    insulin aspart  2-10 Units Subcutaneous TID CC and HS    ampicillin-sulbactam  3 g Intravenous Q6H

## 2025-06-18 NOTE — PROGRESS NOTES
Acquired patient around 1335 from CNICU. Received report from ELISSA Cabral. Skin assessment preformed with ELVIRA Navarro. Intact. Eels and sacrum intact. Scattered bruising. Alert and oriented x4. Forgetful at times. On 3L NC upon arrival. NSR w rare PACs on tele. Pt co mild chest discomfort upon arrival, patient received PRN pain medication in CNIVU. Continent of bowel and bladder. Last dose IV abx finished. IV lasix bid. Call light within reach. Continue plan of care.

## 2025-06-18 NOTE — PAYOR COMM NOTE
--------------  6/18:  CONTINUED STAY REVIEW    Payor: [facility] CORE/NAVIGATE  Subscriber #:  [id number]  Authorization Number: [id number]    Admit date: 6/13/25  Admit time:  8:34 PM      CARDIOLOGY:    Subjective:  Chest soreness but feels otherwise okay. On 2-3L NC     Objective:  /70   Pulse 59   Temp 98.2 °F (36.8 °C) (Temporal)   Resp 17   Ht 6' 3\" (1.905 m)   Wt (!) 414 lb 7.4 oz (188 kg)   SpO2 99%   BMI 51.80 kg/m²      Telemetry: NSR, 1st deg AVB, RBBB     Intake/Output:     Intake/Output Summary (Last 24 hours) at 6/18/2025 1025  Last data filed at 6/18/2025 0942      Gross per 24 hour   Intake 1052 ml   Output 5450 ml   Net -4398 ml              Last 3 Weights   06/18/25 0000 (!) 414 lb 7.4 oz (188 kg)   06/17/25 0100 (!) 435 lb 3 oz (197.4 kg)   06/16/25 0400 (!) 411 lb 9.6 oz (186.7 kg)   06/15/25 0400 (!) 405 lb 10.3 oz (184 kg)   06/13/25 2045 (!) 403 lb 7.1 oz (183 kg)   05/09/25 1512 (!) 380 lb (172.4 kg)   04/04/25 1207 (!) 380 lb (172.4 kg)         Labs:        Recent Labs   Lab 06/17/25  0639 06/17/25  1912 06/18/25  0444   * 155* 104*  104*   BUN 29* 28* 27*  28*   CREATSERUM 1.23 1.22 1.23  1.23   EGFRCR 67 68 67  67   CA 8.9 8.6* 9.1  9.1    143 144  145   K 3.4* 3.2* 3.4*  3.4*  3.4*   CL 99 98 100  100   CO2 34.0* 35.0* 36.0*  36.0*            Recent Labs   Lab 06/16/25  0408 06/17/25  0639 06/18/25  0823   RBC 3.71* 3.60* 3.96*   HGB 11.2* 11.1* 12.1*   HCT 32.9* 32.1* 36.4*   MCV 88.7 89.2 91.9   MCH 30.2 30.8 30.6   MCHC 34.0 34.6 33.2   RDW 13.6 13.6 13.3   NEPRELIM 8.95* 8.87* 7.02   WBC 10.7 11.2* 9.3   .0 205.0 213.0                  Recent Labs   Lab 06/13/25  1656 06/13/25 2056 06/14/25  0419   TROPHS 105* 1,120* 786*         Diagnostics:  No results found.     Review of Systems   Cardiovascular:  Positive for dyspnea on exertion and leg swelling.   Respiratory:  Positive for shortness of breath.    Musculoskeletal:  Positive for myalgias.          Physical Exam:    Gen: alert, oriented x 3, NAD  Heent: pupils equal, reactive. Mucous membranes moist.   Neck: no jvd  Cardiac: regular rate and rhythm, normal S1,S2, no murmur, clicks, rub or gallop  Lungs: diminished  Abd: distended +bs  Ext: generalized edema  Skin: Warm, dry  Neuro: No focal deficits           Assessment:  Pt is a 60 year old male who presented to [facility] for echo; soon after receiving Definity contrast, became SOB, hypoxic, PEA arrest, then asystolic. ROSC achieved after 10 mins CPR  Cardiopulmonary Arrest, PEA then Asystole    ? Anaphylactic Reaction - Hypoxia, arrest s/p Definity w/echo  Required multiple rounds of CPR  Echo w/LVEF 55-60%, no WMA  Acute Hypoxic/Hypercapneic Respiratory Failure, Poss Aspiration PNA  Now extubated - on 8L  Pulm following  Elevated Troponin, Type II  Trop 105 > 1120 > 786 - likely supply/demand d/t above  Volume Overload, Iatrogenic  Diuresis w/IV lasix, excellent u/o thus far  Net + 3.1 L since admit  Paroxysmal Atrial Fibrillation/Flutter  Hx Prior AFib/flutter Ablation 4/2025, DCCV 5/2025  Initially w/brief PAF episodes on IV amiodarone - now stopped  Maintaining NSR - on BB  TSH WNL  Hx on flecainide prior to admit  [id number] (HTN) - on xarelto 20mg daily  ROSEMARY - Cr improving w/diuresis   Morbid Obesity  EL- hx CPAP     Plan:  Maintaining NSR - continue toprol 50mg po BID. No flecainide due to recent IV amio. Can restart outpatient in ~1 month.  Continue xarelto for stroke prophylaxis  Pt remains volume overloaded - continue IV Lasix  Overall doing very well - PT/OT, encourage IS     Plan of care discussed with patient, RN.     Bill Reinoso MD  Cardiac Electrophysiology  [facility]                         MEDICATIONS ADMINISTERED IN LAST 1 DAY:  acetaminophen (Tylenol Extra Strength) tab 1,000 mg       Date Action Dose Route User    6/18/2025 0608 Given 1,000 mg Oral Shireen Locke RN    6/18/2025 0010 Given 1,000 mg Oral  Charito Cox RN    6/17/2025 1742 Given 1,000 mg Oral Amadeo Ortiz RN          ampicillin-sulbactam (Unasyn) 3 g in sodium chloride 0.9% 100mL IVPB-EUGENE       Date Action Dose Route User    6/18/2025 0607 New Bag 3 g Intravenous Shireen Locke RN    6/18/2025 0012 New Bag 3 g Intravenous Charito Cox RN    6/17/2025 1756 New Bag 3 g Intravenous Amadeo Ortiz RN          furosemide (Lasix) 10 mg/mL injection 60 mg       Date Action Dose Route User    6/18/2025 0838 Given 60 mg Intravenous Amadeo Ortiz RN    6/17/2025 1743 Given 60 mg Intravenous Amadeo Ortiz RN          hydrocortisone Na succinate PF (Solu-CORTEF) injection 25 mg       Date Action Dose Route User    6/17/2025 1745 Given 25 mg Intravenous Amadeo Ortiz RN          ibuprofen (Motrin) tab 400 mg       Date Action Dose Route User    6/18/2025 1128 Given 400 mg Oral Amadeo Ortiz RN          metoprolol succinate ER (Toprol XL) 24 hr tab 50 mg       Date Action Dose Route User    6/18/2025 0800 Given 50 mg Oral Amadeo Ortiz RN    6/17/2025 2116 Given 50 mg Oral Charito Cox RN          morphINE PF 4 MG/ML injection 4 mg       Date Action Dose Route User    6/18/2025 1248 Given 4 mg Intravenous Amadeo Ortiz RN    6/18/2025 0718 Given 4 mg Intravenous Amadeo Ortiz RN          potassium chloride 40 mEq in 250mL sodium chloride 0.9% IVPB premix       Date Action Dose Route User    6/17/2025 2202 New Bag 40 mEq Intravenous Charito Cox RN          potassium chloride 40 mEq in 250mL sodium chloride 0.9% IVPB premix       Date Action Dose Route User    6/18/2025 0643 New Bag 40 mEq Intravenous Shireen Locke RN          rivaroxaban (Xarelto) tab 20 mg       Date Action Dose Route User    6/18/2025 0800 Given 20 mg Oral Amadeo Ortiz RN          multivitamin (Tab-A-Xenia/Beta Carotene) tab 1 tablet       Date Action Dose Route User    6/18/2025 0800 Given 1 tablet Oral Amadeo Ortiz RN            Vitals  (last day)       Date/Time Temp Pulse Resp BP SpO2 Weight O2 Device O2 Flow Rate (L/min) [facility]    06/18/25 1338 97.6 °F (36.4 °C) -- -- 136/80 -- -- -- -- EM    06/18/25 0700 -- -- -- -- -- -- Nasal cannula 4 L/min DL    06/18/25 0612 -- -- -- -- -- -- Nasal cannula 4 L/min KP    06/18/25 0300 -- 59 17 -- 99 % -- -- -- SP    06/18/25 0200 -- 50 16 129/70 97 % -- -- -- SP    06/18/25 0100 -- 53 17 -- 95 % -- -- -- SP    06/18/25 0051 -- -- -- -- 98 % -- -- -- SP    06/18/25 0000 98.2 °F (36.8 °C) 68 27 120/99 91 % 414 lb 7.4 oz (188 kg) -- -- SP    06/17/25 2300 -- 57 16 -- 94 % -- -- -- SP    06/17/25 2200 -- 62 29 138/81 100 % -- -- -- SP    06/17/25 2100 -- 72 29 -- -- -- -- -- SP    06/17/25 2000 99.9 °F (37.7 °C) -- -- -- -- -- -- -- SP    06/17/25 1800 -- 67 24 -- 98 % -- -- -- DA    06/17/25 1700 -- 62 17 -- 99 % -- -- -- DA    06/17/25 1600 98.2 °F (36.8 °C) 60 19 105/93 99 % -- -- -- DA    06/17/25 1500 -- 66 31 -- 99 % -- -- -- DA    06/17/25 1400 -- 69 21 162/109 98 % -- -- -- DA    06/17/25 1300 -- -- -- -- 99 % -- -- -- DA    06/17/25 1200 98 °F (36.7 °C) 67 29 141/81 98 % -- Nasal cannula 8 L/min DA    06/17/25 1132 -- 72 -- -- -- -- -- -- CM    06/17/25 1131 -- 64 27 116/94 95 % -- -- -- DA    06/17/25 1100 -- 63 27 164/101 98 % -- -- -- DA    06/17/25 1000 -- 65 28 151/82 97 % -- -- -- DA    06/17/25 0900 -- 66 27 155/77 97 % -- -- -- DA    06/17/25 0800 -- 69 29 145/83 96 % -- -- -- DA    06/17/25 0744 -- 66 23 -- 97 % -- -- -- SP    06/17/25 0744 -- -- -- -- -- -- Nasal cannula 8 L/min DA    06/17/25 0700 98.2 °F (36.8 °C) 67 25 143/80 97 % -- -- -- DA    06/17/25 0600 98.3 °F (36.8 °C) 66 21 151/75 95 % -- -- -- SP    06/17/25 0500 -- 64 22 149/77 96 % -- -- -- SP    06/17/25 0400 -- 63 16 134/65 95 % -- -- 8 L/min SP    06/17/25 0344 -- 67 20 -- -- -- -- -- SP    06/17/25 0344 -- -- -- -- 93 % -- -- -- PS    06/17/25 0300 -- 72 24 167/68 93 % -- -- -- SP    06/17/25 0200 -- 65 18 139/72 93 % -- --  -- SP    06/17/25 0100 -- 71 23 -- -- 435 lb 3 oz (197.4 kg) -- -- SP    06/17/25 0054 -- 73 24 -- -- -- -- -- SP    06/17/25 0000 99.9 °F (37.7 °C) 74 28 160/85 -- -- -- -- STEVE

## 2025-06-18 NOTE — PROGRESS NOTES
[facility]   part of [facility]     Hospitalist Progress Note     Josep Frost Patient Status:  Inpatient    1965 MRN [id number]   Location [facility] 4SW-A Attending Jumana Doe MD   Hosp Day # 5 PCP NEELIMA JEFFERSON     Chief Complaint: cardiac arrest    Subjective:     Patient is sitting up in the chair    Objective:    Review of Systems:   Negative  Wt 435 from 405    Vital signs:  Temp:  [98 °F (36.7 °C)-99.9 °F (37.7 °C)] 99.9 °F (37.7 °C)  Pulse:  [60-72] 67  Resp:  [17-31] 24  BP: (105-164)/() 105/93  SpO2:  [95 %-99 %] 98 %    Physical Exam:    General: stable  Respiratory: No wheezes, no rhonchi  Cardiovascular: S1, S2, regular rate and rhythm  Abdomen: Soft, Non-tender, non-distended, positive bowel sounds  Neuro: No new focal deficits.   Extremities: No edema      Diagnostic Data:    Labs:  Recent Labs   Lab 25  1656 25  0419 06/15/25  0159 06/15/25  0958 25  0408 25  0639   WBC 23.9* 32.9* 21.9* 13.5* 10.7 11.2*   HGB 15.6 15.6 13.7 12.5* 11.2* 11.1*   MCV 93.1 89.9 88.6 88.0 88.7 89.2   .0 349.0 214.0 172.0 163.0 205.0   BAND 8 11  --   --   --   --    INR 1.41* 1.23* 1.27*  --  1.09  --        Recent Labs   Lab 25  0408 25  1643 25  0639 25  1912 25  0444   *   < > 123* 155* 104*  104*   BUN 36*   < > 29* 28* 27*  28*   CREATSERUM 1.40*   < > 1.23 1.22 1.23  1.23   CA 8.7   < > 8.9 8.6* 9.1  9.1   ALB 3.6  --  3.8  --  4.3      < > 143 143 144  145   K 3.9   < > 3.4* 3.2* 3.4*  3.4*  3.4*   CL 99   < > 99 98 100  100   CO2 30.0   < > 34.0* 35.0* 36.0*  36.0*   ALKPHO 66  --  73  --  78   AST 31  --  37*  --  40*   ALT 25  --  26  --  30   BILT 0.4  --  0.5  --  0.6   TP 5.8  --  6.1  --  6.7    < > = values in this interval not displayed.       Estimated Glomerular Filtration Rate: 67 mL/min/1.73m2 (result from lab).    Recent Labs   Lab 25  6890 25  7577 25  1982    TROPHS 105* 1,120* 786*       Recent Labs   Lab 06/14/25  0419 06/15/25  0159 06/16/25  0408   PTP 15.6* 16.1* 14.2   INR 1.23* 1.27* 1.09                    Microbiology    Hospital Encounter on 06/13/25   1. Blood Culture     Status: None (Preliminary result)    Collection Time: 06/14/25  2:59 PM    Specimen: Blood,peripheral   Result Value Ref Range    Blood Culture Result No Growth 3 Days N/A         Imaging: Reviewed in Epic.    Medications: Scheduled Medications[1]    Assessment & Plan:      # PEA arrest    - thought 2/2 definity contrast reaction    - EKG on admission with Afib with RBBB (seen on prior)   - TTE with limited views, though preserved EF, no tamponade physiology or significant valvular abnormalities     - CTA negative for PE   - CTH unremarkable   - Empiric IV abx   -wbc better  -VSS  -wean steroids        # Afib       - s/p successful cardioversion 5/13/2025   - Cardiology following     # NSTEMI, likely type II   - continue trending trops    - cardiology following     # Morbid obesity, BMI 50    #PreDM, A1c 6.0    # Leukocytosis  Improved         Supplementary Documentation:     Quality:  DVT Mechanical Prophylaxis:   SCDs, Early ambuation  DVT Pharmacologic Prophylaxis   Medication    rivaroxaban (Xarelto) tab 20 mg                Code Status: Full Code  [place]: No urinary catheter in place  [place] Duration (in days): 2  Central line:    DERIC:     Discharge is dependent on: progress  At this point Mr. Frost is expected to be discharge to: tbd    The 21st Century Cures Act makes medical notes like these available to patients in the interest of transparency. Please be advised this is a medical document. Medical documents are intended to carry relevant information, facts as evident, and the clinical opinion of the practitioner. The medical note is intended as peer to peer communication and may appear blunt or direct. It is written in medical language and may contain abbreviations or verbiage that  are unfamiliar.                         [1]    potassium chloride  40 mEq Intravenous Once    metoprolol succinate ER  50 mg Oral BID    rivaroxaban  20 mg Oral Daily with food    multivitamin  1 tablet Oral Daily    furosemide  60 mg Intravenous BID (Diuretic)    insulin aspart  2-10 Units Subcutaneous TID CC and HS    ampicillin-sulbactam  3 g Intravenous Q6H

## 2025-06-18 NOTE — CM/SW NOTE
Care Progression Note  LOS 5 days    Patient presented from [facility] clinic for ECHO--after getting Difinity--went into PEA arrest--asystole--ROSC achieved after 10 minutes of CPR.  Patient remains in sinus on toprol--seen by EP--plan to hold flecainide since recent IV amio.  Worked with therapies--HH recommended--continue to diurese with IV diuretics    Home with [facility]--needs AIDIN list to review/select

## 2025-06-18 NOTE — PHYSICAL THERAPY NOTE
Attempted to see pt for PT this afternoon. Chart reviewed and RN was consulted. Patient politely declined PT right now stating \" I already walked two times with the nursing staff in ICU, I will do it again with the nursing staff after dinner.\"   Patient was educated on activity guidelines and PT will follow up at a later date.

## 2025-06-19 PROBLEM — J96.22 ACUTE ON CHRONIC RESPIRATORY FAILURE WITH HYPERCAPNIA (HCC): Status: ACTIVE | Noted: 2025-06-19

## 2025-06-19 LAB
ALBUMIN SERPL-MCNC: 4.4 G/DL (ref 3.2–4.8)
ALBUMIN/GLOB SERPL: 1.8 {RATIO} (ref 1–2)
ALP LIVER SERPL-CCNC: 81 U/L (ref 45–117)
ALT SERPL-CCNC: 38 U/L (ref 10–49)
ANION GAP SERPL CALC-SCNC: 10 MMOL/L (ref 0–18)
ARTERIAL PATENCY WRIST A: POSITIVE
AST SERPL-CCNC: 48 U/L (ref ?–34)
BASE EXCESS BLDA CALC-SCNC: 15.8 MMOL/L (ref ?–2)
BILIRUB SERPL-MCNC: 0.7 MG/DL (ref 0.2–1.1)
BODY TEMPERATURE: 98.6 F
BUN BLD-MCNC: 26 MG/DL (ref 9–23)
BUN BLD-MCNC: 28 MG/DL (ref 9–23)
BUN BLD-MCNC: 28 MG/DL (ref 9–23)
CA-I BLD-SCNC: 1.16 MMOL/L (ref 0.95–1.32)
CALCIUM BLD-MCNC: 9.4 MG/DL (ref 8.7–10.6)
CHLORIDE SERPL-SCNC: 97 MMOL/L (ref 98–112)
CHLORIDE SERPL-SCNC: 98 MMOL/L (ref 98–112)
CHLORIDE SERPL-SCNC: 98 MMOL/L (ref 98–112)
CO2 SERPL-SCNC: 33 MMOL/L (ref 21–32)
CO2 SERPL-SCNC: 35 MMOL/L (ref 21–32)
CO2 SERPL-SCNC: 35 MMOL/L (ref 21–32)
COHGB MFR BLD: 2.1 % SAT (ref 0–3)
CREAT BLD-MCNC: 1.21 MG/DL (ref 0.7–1.3)
CREAT BLD-MCNC: 1.21 MG/DL (ref 0.7–1.3)
CREAT BLD-MCNC: 1.37 MG/DL (ref 0.7–1.3)
EGFRCR SERPLBLD CKD-EPI 2021: 59 ML/MIN/1.73M2 (ref 60–?)
EGFRCR SERPLBLD CKD-EPI 2021: 69 ML/MIN/1.73M2 (ref 60–?)
EGFRCR SERPLBLD CKD-EPI 2021: 69 ML/MIN/1.73M2 (ref 60–?)
GLOBULIN PLAS-MCNC: 2.4 G/DL (ref 2–3.5)
GLUCOSE BLD-MCNC: 103 MG/DL (ref 70–99)
GLUCOSE BLD-MCNC: 108 MG/DL (ref 70–99)
GLUCOSE BLD-MCNC: 108 MG/DL (ref 70–99)
GLUCOSE BLD-MCNC: 116 MG/DL (ref 70–99)
GLUCOSE BLD-MCNC: 122 MG/DL (ref 70–99)
GLUCOSE BLD-MCNC: 127 MG/DL (ref 70–99)
GLUCOSE BLD-MCNC: 149 MG/DL (ref 70–99)
HCO3 BLDA-SCNC: 37.2 MEQ/L (ref 21–27)
HGB BLD-MCNC: 12.7 G/DL (ref 13–17.5)
L/M: 3 L/MIN
LACTATE BLD-SCNC: 0.7 MMOL/L (ref 0.5–2)
MAGNESIUM SERPL-MCNC: 2 MG/DL (ref 1.6–2.6)
MAGNESIUM SERPL-MCNC: 2 MG/DL (ref 1.6–2.6)
METHGB MFR BLD: 0.4 % SAT (ref 0.4–1.5)
NT-PROBNP SERPL-MCNC: 391 PG/ML (ref ?–125)
OSMOLALITY SERPL CALC.SUM OF ELEC: 295 MOSM/KG (ref 275–295)
OSMOLALITY SERPL CALC.SUM OF ELEC: 302 MOSM/KG (ref 275–295)
OSMOLALITY SERPL CALC.SUM OF ELEC: 302 MOSM/KG (ref 275–295)
OXYHGB MFR BLDA: 95.2 % (ref 92–100)
PCO2 BLDA: 58 MM HG (ref 35–45)
PH BLDA: 7.47 [PH] (ref 7.35–7.45)
PO2 BLDA: 79 MM HG (ref 80–100)
POTASSIUM BLD-SCNC: 3.4 MMOL/L (ref 3.6–5.1)
POTASSIUM SERPL-SCNC: 3.2 MMOL/L (ref 3.5–5.1)
POTASSIUM SERPL-SCNC: 3.7 MMOL/L (ref 3.5–5.1)
POTASSIUM SERPL-SCNC: 3.8 MMOL/L (ref 3.5–5.1)
PROT SERPL-MCNC: 6.8 G/DL (ref 5.7–8.2)
SODIUM BLD-SCNC: 138 MMOL/L (ref 135–145)
SODIUM SERPL-SCNC: 140 MMOL/L (ref 136–145)
SODIUM SERPL-SCNC: 143 MMOL/L (ref 136–145)
SODIUM SERPL-SCNC: 143 MMOL/L (ref 136–145)

## 2025-06-19 PROCEDURE — 99232 SBSQ HOSP IP/OBS MODERATE 35: CPT | Performed by: INTERNAL MEDICINE

## 2025-06-19 PROCEDURE — 99232 SBSQ HOSP IP/OBS MODERATE 35: CPT | Performed by: HOSPITALIST

## 2025-06-19 RX ORDER — ACETAMINOPHEN 500 MG
500 TABLET ORAL 3 TIMES DAILY
Status: DISCONTINUED | OUTPATIENT
Start: 2025-06-19 | End: 2025-06-22

## 2025-06-19 RX ORDER — KETOROLAC TROMETHAMINE 30 MG/ML
30 INJECTION, SOLUTION INTRAMUSCULAR; INTRAVENOUS EVERY 6 HOURS PRN
Status: DISPENSED | OUTPATIENT
Start: 2025-06-19 | End: 2025-06-21

## 2025-06-19 RX ORDER — POTASSIUM CHLORIDE 1500 MG/1
40 TABLET, EXTENDED RELEASE ORAL ONCE
Status: COMPLETED | OUTPATIENT
Start: 2025-06-19 | End: 2025-06-19

## 2025-06-19 RX ORDER — KETOROLAC TROMETHAMINE 15 MG/ML
15 INJECTION, SOLUTION INTRAMUSCULAR; INTRAVENOUS EVERY 6 HOURS PRN
Status: DISPENSED | OUTPATIENT
Start: 2025-06-19 | End: 2025-06-21

## 2025-06-19 RX ORDER — POTASSIUM CHLORIDE 1.5 G/1.58G
40 POWDER, FOR SOLUTION ORAL EVERY 4 HOURS
Status: COMPLETED | OUTPATIENT
Start: 2025-06-19 | End: 2025-06-19

## 2025-06-19 NOTE — PLAN OF CARE
Assumed care of pt at 1930.  A&O x4. C/o mild chest pain when coughing. Reports occasionally coughing up clear mucus earlier today.  O2 Sat 95% on 1L NC.  NSR w/ BBB & rare PACs on tele.  +2 edema to BLE.  Continent of B&B.  Small blister on anterior left forearm. Cleansed and covered with silver optifoam.  Activity level SBA in room, x1 assist with walker in halls.  Call light & belongings within reach. Bed in lowest position with alarm on and wheels locked. Updated on POC & verbalized understanding.    POC:  - IV lasix BID  - Daily weights,  strict I&O  - Wean O2  - ABG in AM      Problem: CARDIOVASCULAR - ADULT  Goal: Maintains optimal cardiac output and hemodynamic stability  Description: INTERVENTIONS:  - Monitor vital signs, rhythm, and trends  - Monitor for bleeding, hypotension and signs of decreased cardiac output  - Evaluate effectiveness of vasoactive medications to optimize hemodynamic stability  - Monitor arterial and/or venous puncture sites for bleeding and/or hematoma  - Assess quality of pulses, skin color and temperature  - Assess for signs of decreased coronary artery perfusion - ex. Angina  - Evaluate fluid balance, assess for edema, trend weights  Outcome: Progressing  Goal: Absence of cardiac arrhythmias or at baseline  Description: INTERVENTIONS:  - Continuous cardiac monitoring, monitor vital signs, obtain 12 lead EKG if indicated  - Evaluate effectiveness of antiarrhythmic and heart rate control medications as ordered  - Initiate emergency measures for life threatening arrhythmias  - Monitor electrolytes and administer replacement therapy as ordered  Outcome: Progressing     Problem: RESPIRATORY - ADULT  Goal: Achieves optimal ventilation and oxygenation  Description: INTERVENTIONS:  - Assess for changes in respiratory status  - Assess for changes in mentation and behavior  - Position to facilitate oxygenation and minimize respiratory effort  - Oxygen supplementation based on oxygen  saturation or ABGs  - Provide Smoking Cessation handout, if applicable  - Encourage broncho-pulmonary hygiene including cough, deep breathe, Incentive Spirometry  - Assess the need for suctioning and perform as needed  - Assess and instruct to report SOB or any respiratory difficulty  - Respiratory Therapy support as indicated  - Manage/alleviate anxiety  - Monitor for signs/symptoms of CO2 retention  Outcome: Progressing     Problem: GASTROINTESTINAL - ADULT  Goal: Minimal or absence of nausea and vomiting  Description: INTERVENTIONS:  - Maintain adequate hydration with IV or PO as ordered and tolerated  - Nasogastric tube to low intermittent suction as ordered  - Evaluate effectiveness of ordered antiemetic medications  - Provide nonpharmacologic comfort measures as appropriate  - Advance diet as tolerated, if ordered  - Obtain nutritional consult as needed  - Evaluate fluid balance  Outcome: Progressing  Goal: Maintains or returns to baseline bowel function  Description: INTERVENTIONS:  - Assess bowel function  - Maintain adequate hydration with IV or PO as ordered and tolerated  - Evaluate effectiveness of GI medications  - Encourage mobilization and activity  - Obtain nutritional consult as needed  - Establish a toileting routine/schedule  - Consider collaborating with pharmacy to review patient's medication profile  Outcome: Progressing     Problem: METABOLIC/FLUID AND ELECTROLYTES - ADULT  Goal: Glucose maintained within prescribed range  Description: INTERVENTIONS:  - Monitor Blood Glucose as ordered  - Assess for signs and symptoms of hyperglycemia and hypoglycemia  - Administer ordered medications to maintain glucose within target range  - Assess barriers to adequate nutritional intake and initiate nutrition consult as needed  - Instruct patient on self management of diabetes  Outcome: Progressing  Goal: Electrolytes maintained within normal limits  Description: INTERVENTIONS:  - Monitor labs and rhythm  and assess patient for signs and symptoms of electrolyte imbalances  - Administer electrolyte replacement as ordered  - Monitor response to electrolyte replacements, including rhythm and repeat lab results as appropriate  - Fluid restriction as ordered  - Instruct patient on fluid and nutrition restrictions as appropriate  Outcome: Progressing  Goal: Hemodynamic stability and optimal renal function maintained  Description: INTERVENTIONS:  - Monitor labs and assess for signs and symptoms of volume excess or deficit  - Monitor intake, output and patient weight  - Monitor urine specific gravity, serum osmolarity and serum sodium as indicated or ordered  - Monitor response to interventions for patient's volume status, including labs, urine output, blood pressure (other measures as available)  - Encourage oral intake as appropriate  - Instruct patient on fluid and nutrition restrictions as appropriate  Outcome: Progressing     Problem: SKIN/TISSUE INTEGRITY - ADULT  Goal: Skin integrity remains intact  Description: INTERVENTIONS  - Assess and document risk factors for pressure ulcer development  - Assess and document skin integrity  - Monitor for areas of redness and/or skin breakdown  - Initiate interventions, skin care algorithm/standards of care as needed  Outcome: Progressing  Goal: Oral mucous membranes remain intact  Description: INTERVENTIONS  - Assess oral mucosa and hygiene practices  - Implement preventative oral hygiene regimen  - Implement oral medicated treatments as ordered  Outcome: Progressing     Problem: NEUROLOGICAL - ADULT  Goal: Achieves stable or improved neurological status  Description: INTERVENTIONS  - Assess for and report changes in neurological status  - Initiate measures to prevent increased intracranial pressure  - Maintain blood pressure and fluid volume within ordered parameters to optimize cerebral perfusion and minimize risk of hemorrhage  - Monitor temperature, glucose, and sodium.  Initiate appropriate interventions as ordered  Outcome: Progressing  Goal: Absence of seizures  Description: INTERVENTIONS  - Monitor for seizure activity  - Administer anti-seizure medications as ordered  - Monitor neurological status  Outcome: Progressing  Goal: Remains free of injury related to seizure activity  Description: INTERVENTIONS:  - Maintain airway, patient safety  and administer oxygen as ordered  - Monitor patient for seizure activity, document and report duration and description of seizure to MD/LIP  - If seizure occurs, turn patient to side and suction secretions as needed  - Reorient patient post seizure  - Seizure pads on all 4 side rails  - Instruct patient/family to notify RN of any seizure activity  - Instruct patient/family to call for assistance with activity based on assessment  Outcome: Progressing  Goal: Achieves maximal functionality and self care  Description: INTERVENTIONS  - Monitor swallowing and airway patency with patient fatigue and changes in neurological status  - Encourage and assist patient to increase activity and self care with guidance from PT/OT  - Encourage visually impaired, hearing impaired and aphasic patients to use assistive/communication devices  Outcome: Progressing     Problem: Impaired Functional Mobility  Goal: Achieve highest/safest level of mobility/gait  Description: Interventions:  - Assess patient's functional ability and stability  - Promote increasing activity/tolerance for mobility and gait  - Educate and engage patient/family in tolerated activity level and precautions  Outcome: Progressing     Problem: Impaired Cognition  Goal: Patient will exhibit improved attention, thought processing and/or memory  Description: Interventions:  Outcome: Progressing     Problem: Diabetes/Glucose Control  Goal: Glucose maintained within prescribed range  Description: INTERVENTIONS:  - Monitor Blood Glucose as ordered  - Assess for signs and symptoms of hyperglycemia and  hypoglycemia  - Administer ordered medications to maintain glucose within target range  - Assess barriers to adequate nutritional intake and initiate nutrition consult as needed  - Instruct patient on self management of diabetes  Outcome: Progressing

## 2025-06-19 NOTE — PROGRESS NOTES
[facility]   part of [facility]     Hospitalist Progress Note     Josep Frost Patient Status:  Inpatient    1965 MRN [id number]   Location [facility] 4SW-A Attending Jumana Doe MD   Hosp Day # 6 PCP NEELIMA JEFFERSON     Chief Complaint: cardiac arrest    Subjective:     Patient is sitting up in the chair    Objective:    Review of Systems:   Negative  Wt 435 from 405    Vital signs:  Temp:  [97.6 °F (36.4 °C)-98.4 °F (36.9 °C)] 97.6 °F (36.4 °C)  Pulse:  [53-68] 63  Resp:  [18-24] 22  BP: (128-174)/(64-89) 128/64  SpO2:  [92 %-96 %] 95 %    Physical Exam:    General: stable  Respiratory: No wheezes, no rhonchi  Cardiovascular: S1, S2, regular rate and rhythm  Abdomen: Soft, Non-tender, non-distended, positive bowel sounds  Neuro: No new focal deficits.   Extremities: No edema      Diagnostic Data:    Labs:  Recent Labs   Lab 25  1656 25  0419 06/15/25  0159 06/15/25  0958 25  0408 25  0639 25  0823   WBC 23.9* 32.9* 21.9* 13.5* 10.7 11.2* 9.3   HGB 15.6 15.6 13.7 12.5* 11.2* 11.1* 12.1*   MCV 93.1 89.9 88.6 88.0 88.7 89.2 91.9   .0 349.0 214.0 172.0 163.0 205.0 213.0   BAND 8 11  --   --   --   --   --    INR 1.41* 1.23* 1.27*  --  1.09  --   --        Recent Labs   Lab 25  0639 25  1912 25  0444 25  1557 25  0638   *   < > 104*  104* 98 108*  108*   BUN 29*   < > 27*  28* 27* 28*  28*   CREATSERUM 1.23   < > 1.23  1.23 1.28 1.21  1.21   CA 8.9   < > 9.1  9.1 9.1 9.4  9.4   ALB 3.8  --  4.3  --  4.4      < > 144  145 144 143  143   K 3.4*   < > 3.4*  3.4*  3.4* 3.8 3.2*  3.2*  3.2*   CL 99   < > 100  100 98 98  98   CO2 34.0*   < > 36.0*  36.0* 36.0* 35.0*  35.0*   ALKPHO 73  --  78  --  81   AST 37*  --  40*  --  48*   ALT 26  --  30  --  38   BILT 0.5  --  0.6  --  0.7   TP 6.1  --  6.7  --  6.8    < > = values in this interval not displayed.       Estimated Glomerular Filtration Rate: 69  mL/min/1.73m2 (result from lab).    Recent Labs   Lab 06/13/25  1656 06/13/25 2056 06/14/25  0419   TROPHS 105* 1,120* 786*       Recent Labs   Lab 06/14/25  0419 06/15/25  0159 06/16/25  0408   PTP 15.6* 16.1* 14.2   INR 1.23* 1.27* 1.09                    Microbiology    Hospital Encounter on 06/13/25   1. Blood Culture     Status: None (Preliminary result)    Collection Time: 06/14/25  2:59 PM    Specimen: Blood,peripheral   Result Value Ref Range    Blood Culture Result No Growth 4 Days N/A         Imaging: Reviewed in Epic.    Medications: Scheduled Medications[1]    Assessment & Plan:      # PEA arrest    - thought 2/2 definity contrast reaction    - EKG on admission with Afib with RBBB (seen on prior)   - TTE with limited views, though preserved EF, no tamponade physiology or significant valvular abnormalities     - CTA negative for PE   - CTH unremarkable   - Empiric IV abx   -wbc better  -VSS  -wean steroids        # Afib       - s/p successful cardioversion 5/13/2025   - Cardiology following     # NSTEMI, likely type II   - continue trending trops    - cardiology following     # Morbid obesity, BMI 50    #PreDM, A1c 6.0    # Leukocytosis  Improved         Supplementary Documentation:     Quality:  DVT Mechanical Prophylaxis:   SCDs, Early ambuation  DVT Pharmacologic Prophylaxis   Medication    rivaroxaban (Xarelto) tab 20 mg                Code Status: Full Code  [place]: No urinary catheter in place  [place] Duration (in days): 2  Central line:    DERIC:     Discharge is dependent on: progress  At this point Mr. Frost is expected to be discharge to: tbd    The 21st Century Cures Act makes medical notes like these available to patients in the interest of transparency. Please be advised this is a medical document. Medical documents are intended to carry relevant information, facts as evident, and the clinical opinion of the practitioner. The medical note is intended as peer to peer communication and may  appear blunt or direct. It is written in medical language and may contain abbreviations or verbiage that are unfamiliar.                         [1]    potassium chloride  40 mEq Oral Q4H    metoprolol succinate ER  50 mg Oral BID    rivaroxaban  20 mg Oral Daily with food    multivitamin  1 tablet Oral Daily    furosemide  60 mg Intravenous BID (Diuretic)    insulin aspart  2-10 Units Subcutaneous TID CC and HS

## 2025-06-19 NOTE — CM/SW NOTE
06/19/25 1200   CM/SW Referral Data   Referral Source Social Work (self-referral)   Reason for Referral Discharge planning   Informant EMR;Clinical Staff Member;Patient   Patient Info   Patient's Current Mental Status at Time of Assessment Alert;Oriented   Patient's Home Environment House   Number of Levels in Home 2   Patient lives with Spouse/Significant other   Patient Status Prior to Admission   Independent with ADLs and Mobility Yes   Services in place prior to admission DME/Supplies at home   Type of DME/Supplies CPAP   Discharge Needs   Anticipated D/C needs Home health care   Choice of Post-Acute Provider   Informed patient of right to choose their preferred provider Yes   List of appropriate post-acute services provided to patient/family with quality data Yes   Patient/family choice [facility]   Information given to Patient  (Family)     HOME SITUATION per PT eval  Type of Home: House  Home Layout: Two level, Able to live on main level  Stairs to Enter : 2   Railing: Yes              Lives With: Spouse    Drives: Yes   Patient Regularly Uses: Glasses       Prior Level of [place] per PT eval: independent, works for [employer]        Patient is a 59 y/o male admitted due to cardiac arrest while getting an OP Echo.     Anticipated therapy need for pt at [place] is HH. Referral was sent in A[place] and options list was obtained. [facility] was the only accepting agency.      TEMO met with pt and family at bedside to discuss DC plan.    Pt reports he lives with his wife in ranch style home with a basement. Pt denied owning an AD but stated he has a CPAP at home. Pt reports being independent with his ADLs.    TEMO discussed HH services for pt at [place]. Pt agreeable with services. TEMO provided him with [facility] information. [facility]  [facility]    Pt and family thanked TEMO for stopping by and denied having any further questions at this time.      [facility]  and added their information to pt's AVS.       to remain available for support and/or discharge planning.    Felicia Aragon ZARIA  Discharge Planner  [phone]

## 2025-06-19 NOTE — PLAN OF CARE
Acquired patient around 0730 Alert and oriented x4. Forgetful at times. On 2L NC. Wean as able. NSR w rare PACs on tele. Pt co mild chest discomfort. Scheduled and PRN pain medication given. Continent of bowel and bladder. IV lasix bid. X1 IV Diamox. Call light within reach. Continue plan of care.     1859: Pt converted into Afib while ambulating in halls  around 1810. HR originally 120s-130 but has since been sustaining 90s-110s. Asymptomatic. /82. EKG obtained. [facility] APN notified.      Problem: CARDIOVASCULAR - ADULT  Goal: Maintains optimal cardiac output and hemodynamic stability  Description: INTERVENTIONS:  - Monitor vital signs, rhythm, and trends  - Monitor for bleeding, hypotension and signs of decreased cardiac output  - Evaluate effectiveness of vasoactive medications to optimize hemodynamic stability  - Monitor arterial and/or venous puncture sites for bleeding and/or hematoma  - Assess quality of pulses, skin color and temperature  - Assess for signs of decreased coronary artery perfusion - ex. Angina  - Evaluate fluid balance, assess for edema, trend weights  Outcome: Progressing  Goal: Absence of cardiac arrhythmias or at baseline  Description: INTERVENTIONS:  - Continuous cardiac monitoring, monitor vital signs, obtain 12 lead EKG if indicated  - Evaluate effectiveness of antiarrhythmic and heart rate control medications as ordered  - Initiate emergency measures for life threatening arrhythmias  - Monitor electrolytes and administer replacement therapy as ordered  Outcome: Progressing     Problem: RESPIRATORY - ADULT  Goal: Achieves optimal ventilation and oxygenation  Description: INTERVENTIONS:  - Assess for changes in respiratory status  - Assess for changes in mentation and behavior  - Position to facilitate oxygenation and minimize respiratory effort  - Oxygen supplementation based on oxygen saturation or ABGs  - Provide Smoking Cessation handout, if applicable  - Encourage broncho-pulmonary  hygiene including cough, deep breathe, Incentive Spirometry  - Assess the need for suctioning and perform as needed  - Assess and instruct to report SOB or any respiratory difficulty  - Respiratory Therapy support as indicated  - Manage/alleviate anxiety  - Monitor for signs/symptoms of CO2 retention  Outcome: Progressing     Problem: GASTROINTESTINAL - ADULT  Goal: Minimal or absence of nausea and vomiting  Description: INTERVENTIONS:  - Maintain adequate hydration with IV or PO as ordered and tolerated  - Nasogastric tube to low intermittent suction as ordered  - Evaluate effectiveness of ordered antiemetic medications  - Provide nonpharmacologic comfort measures as appropriate  - Advance diet as tolerated, if ordered  - Obtain nutritional consult as needed  - Evaluate fluid balance  Outcome: Progressing  Goal: Maintains or returns to baseline bowel function  Description: INTERVENTIONS:  - Assess bowel function  - Maintain adequate hydration with IV or PO as ordered and tolerated  - Evaluate effectiveness of GI medications  - Encourage mobilization and activity  - Obtain nutritional consult as needed  - Establish a toileting routine/schedule  - Consider collaborating with pharmacy to review patient's medication profile  Outcome: Progressing     Problem: METABOLIC/FLUID AND ELECTROLYTES - ADULT  Goal: Glucose maintained within prescribed range  Description: INTERVENTIONS:  - Monitor Blood Glucose as ordered  - Assess for signs and symptoms of hyperglycemia and hypoglycemia  - Administer ordered medications to maintain glucose within target range  - Assess barriers to adequate nutritional intake and initiate nutrition consult as needed  - Instruct patient on self management of diabetes  Outcome: Progressing  Goal: Electrolytes maintained within normal limits  Description: INTERVENTIONS:  - Monitor labs and rhythm and assess patient for signs and symptoms of electrolyte imbalances  - Administer electrolyte  replacement as ordered  - Monitor response to electrolyte replacements, including rhythm and repeat lab results as appropriate  - Fluid restriction as ordered  - Instruct patient on fluid and nutrition restrictions as appropriate  Outcome: Progressing  Goal: Hemodynamic stability and optimal renal function maintained  Description: INTERVENTIONS:  - Monitor labs and assess for signs and symptoms of volume excess or deficit  - Monitor intake, output and patient weight  - Monitor urine specific gravity, serum osmolarity and serum sodium as indicated or ordered  - Monitor response to interventions for patient's volume status, including labs, urine output, blood pressure (other measures as available)  - Encourage oral intake as appropriate  - Instruct patient on fluid and nutrition restrictions as appropriate  Outcome: Progressing     Problem: SKIN/TISSUE INTEGRITY - ADULT  Goal: Skin integrity remains intact  Description: INTERVENTIONS  - Assess and document risk factors for pressure ulcer development  - Assess and document skin integrity  - Monitor for areas of redness and/or skin breakdown  - Initiate interventions, skin care algorithm/standards of care as needed  Outcome: Progressing  Goal: Oral mucous membranes remain intact  Description: INTERVENTIONS  - Assess oral mucosa and hygiene practices  - Implement preventative oral hygiene regimen  - Implement oral medicated treatments as ordered  Outcome: Progressing     Problem: NEUROLOGICAL - ADULT  Goal: Achieves stable or improved neurological status  Description: INTERVENTIONS  - Assess for and report changes in neurological status  - Initiate measures to prevent increased intracranial pressure  - Maintain blood pressure and fluid volume within ordered parameters to optimize cerebral perfusion and minimize risk of hemorrhage  - Monitor temperature, glucose, and sodium. Initiate appropriate interventions as ordered  Outcome: Progressing  Goal: Absence of  seizures  Description: INTERVENTIONS  - Monitor for seizure activity  - Administer anti-seizure medications as ordered  - Monitor neurological status  Outcome: Progressing  Goal: Remains free of injury related to seizure activity  Description: INTERVENTIONS:  - Maintain airway, patient safety  and administer oxygen as ordered  - Monitor patient for seizure activity, document and report duration and description of seizure to MD/LIP  - If seizure occurs, turn patient to side and suction secretions as needed  - Reorient patient post seizure  - Seizure pads on all 4 side rails  - Instruct patient/family to notify RN of any seizure activity  - Instruct patient/family to call for assistance with activity based on assessment  Outcome: Progressing  Goal: Achieves maximal functionality and self care  Description: INTERVENTIONS  - Monitor swallowing and airway patency with patient fatigue and changes in neurological status  - Encourage and assist patient to increase activity and self care with guidance from PT/OT  - Encourage visually impaired, hearing impaired and aphasic patients to use assistive/communication devices  Outcome: Progressing     Problem: Impaired Functional Mobility  Goal: Achieve highest/safest level of mobility/gait  Description: Interventions:  - Assess patient's functional ability and stability  - Promote increasing activity/tolerance for mobility and gait  - Educate and engage patient/family in tolerated activity level and precautions  - Recommend use of chair position in bed 3 times per day  Outcome: Progressing     Problem: Impaired Cognition  Goal: Patient will exhibit improved attention, thought processing and/or memory  Description: Interventions:  Outcome: Progressing     Problem: Diabetes/Glucose Control  Goal: Glucose maintained within prescribed range  Description: INTERVENTIONS:  - Monitor Blood Glucose as ordered  - Assess for signs and symptoms of hyperglycemia and hypoglycemia  - Administer  ordered medications to maintain glucose within target range  - Assess barriers to adequate nutritional intake and initiate nutrition consult as needed  - Instruct patient on self management of diabetes  Outcome: Progressing

## 2025-06-19 NOTE — PROGRESS NOTES
[facility] Cardiology Progress Note    Josepaneudy Frost Patient Status:  Inpatient    1965 MRN [id number]   Location [facility] 2NE-A Attending Glendy Hayden MD   Hosp Day # 6 PCP NEELIMA JEFFERSON     Subjective:  No acute events overnight  Sob/swelling improving      Objective:  /64 (BP Location: Right arm)   Pulse 63   Temp 97.6 °F (36.4 °C) (Oral)   Resp 22   Ht 6' 3\" (1.905 m)   Wt (!) 382 lb 0.9 oz (173.3 kg)   SpO2 95%   BMI 47.75 kg/m²     Telemetry: SR      Intake/Output:    Intake/Output Summary (Last 24 hours) at 2025 1017  Last data filed at 2025 0910  Gross per 24 hour   Intake 1700 ml   Output 2250 ml   Net -550 ml       Last 3 Weights   25 0813 (!) 382 lb 0.9 oz (173.3 kg)   25 0522 (!) 380 lb 6.4 oz (172.5 kg)   25 0000 (!) 414 lb 7.4 oz (188 kg)   25 0100 (!) 435 lb 3 oz (197.4 kg)   25 0400 (!) 411 lb 9.6 oz (186.7 kg)   06/15/25 0400 (!) 405 lb 10.3 oz (184 kg)   25 2045 (!) 403 lb 7.1 oz (183 kg)   25 1512 (!) 380 lb (172.4 kg)   25 1207 (!) 380 lb (172.4 kg)       Labs:  Recent Labs   Lab 25  0444 25  1557 25  0638   *  104* 98 108*  108*   BUN 27*  28* 27* 28*  28*   CREATSERUM 1.23  1.23 1.28 1.21  1.21   EGFRCR 67  67 64 69  69   CA 9.1  9.1 9.1 9.4  9.4     145 144 143  143   K 3.4*  3.4*  3.4* 3.8 3.2*  3.2*  3.2*     100 98 98  98   CO2 36.0*  36.0* 36.0* 35.0*  35.0*     Recent Labs   Lab 25  0408 25  0639 25  0823   RBC 3.71* 3.60* 3.96*   HGB 11.2* 11.1* 12.1*   HCT 32.9* 32.1* 36.4*   MCV 88.7 89.2 91.9   MCH 30.2 30.8 30.6   MCHC 34.0 34.6 33.2   RDW 13.6 13.6 13.3   NEPRELIM 8.95* 8.87* 7.02   WBC 10.7 11.2* 9.3   .0 205.0 213.0         Recent Labs   Lab 25  1656 25  0419   TROPHS 105* 1,120* 786*      Latest Reference Range & Units 25 07:53   ABG PH 7.35 - 7.45  7.47 (H)   ABG PCO2  35 - 45 mm Hg 58 (H)   ABG PO2 80 - 100 mm Hg 79 (L)   ABG HCO3 21.0 - 27.0 mEq/L 37.2 (H)   ABG BASE EXCESS -2.0 - 2.0 mmol/L 15.8 (H)   Arterial Blood Gas O2Hb 92.0 - 100.0 % 95.2   TOTAL HEMOGLOBIN 13.0 - 17.5 g/dL 12.7 (L)   METHEMOGLOBIN 0.4 - 1.5 % SAT 0.4   POTASSIUM BLOOD GAS 3.6 - 5.1 mmol/L 3.4 (L)   SODIUM BLOOD  - 145 mmol/L 138   Lactic Acid (Blood Gas) 0.5 - 2.0 mmol/L 0.7   PATIENT TEMPERATURE F 98.6   CARBOXYHEMOGLOBIN 0.0 - 3.0 % SAT 2.1   (H): Data is abnormally high  (L): Data is abnormally low  Diagnostics:             Physical Exam:    Physical Exam  Constitutional:       Appearance: He is obese.   Cardiovascular:      Rate and Rhythm: Normal rate and regular rhythm.   Pulmonary:      Effort: Pulmonary effort is normal.   Musculoskeletal:      Right lower leg: Edema present.      Left lower leg: Edema present.   Neurological:      Mental Status: He is alert and oriented to person, place, and time.         Medications:  Scheduled Medications[1]  Medication Infusions[2]    Assessment:  Pt is a 60 year old male who presented to [facility] clinic for echo; soon after receiving Definity contrast, became SOB, hypoxic, PEA arrest, then asystolic. ROSC achieved after 10 mins CPR  Cardiopulmonary Arrest, PEA then Asystole    ? Anaphylactic Reaction - Hypoxia, arrest s/p Definity w/echo  Required multiple rounds of CPR  Acute Hypoxic/Hypercapneic Respiratory Failure, Poss Aspiration PNA  Now extubated - decreased O2 requirements  On antibx  CPAP at [facility] following  Elevated Troponin, Type II  Trop 105 > 1120 > 786 - likely supply/demand d/t above  Pt reports prior stress test w/normal perfusion 2021  Volume Overload, Acute on Chronic HFpEF, Partly Iatrogenic  Diuresis w/IV lasix, negative 2-3L per day  Echo w/LVEF 55-60%, no WMA, normal LA size reported  Unsure of Dry wt, still volume up at 382lb today   Paroxysmal Atrial Fibrillation/Flutter  [facility]  Initially w/brief PAF episodes and s/p IV amiodarone - now stopped  Hx on flecainide prior to admit, will keep off due to recent IV Amio, can restart outpatient in 1 month  Maintaining NSR - on BB along for now   TSH WNL  [id number] 1 (HTN) - on xarelto 20mg daily  ROSEMARY - Cr improved w/diuresis   Morbid Obesity  EL - CPAP      Plan:    Continue IV diuresis  Alkalotic on abg, mixed respiratory and contractoin, add diamox today  Check labs this afternoon and follow hypokalemia tx  Will review if ischemic evaluation is needed with physcian. PEA leading to asystole is this case most likely triggered by hypoxia with post-Definity reaction.     Nichol Barnhart, APRN  6/19/2025  10:17 AM  Ph [phone] ([place])  Ph [phone] ([place])        Patient seen and examined independently.  Note reviewed and labs reviewed.  Agree to the assessment and plan with the following additions/changes.  Entire medical decision making & plan performed by myself.    General: NAD.  HEENT: Normocephalic.  Neck: Supple.  Cardiac: RRR. Normal S1, S2 . No murmur.  Lungs: GAEB.  Extremities: LE edema 2-3+.    A/P:  Cardiopulmonary arrest secondary to hypoxia  Suspected Anaphylactic reaction following Definity injection  T2MI, PAF, Morbid obesity  Still volume overloaded. Good response to diuresis  Continue IV lasix, suspect may need several days still  PAF, now in NSR. Flecainide to be hled for 1 month following amiodarone load as per Dr. Reinoso  No plans for ischemic eval this admission    LOC L3    Baljinder Fu MD  6/19/2025  Interventional Cardiology  [facility]  =======================================================         [1]    potassium chloride  40 mEq Oral Q4H    acetaminophen  500 mg Oral TID    metoprolol succinate ER  50 mg Oral BID    rivaroxaban  20 mg Oral Daily with food    multivitamin  1 tablet Oral Daily    furosemide  60 mg Intravenous BID (Diuretic)    insulin aspart  2-10 Units  Subcutaneous TID CC and HS   [2]    dextrose 10%

## 2025-06-19 NOTE — PROGRESS NOTES
[facility]  Progress Note    Josep Frost Patient Status:  Inpatient    1965 MRN [id number]   Location [facility] 2NE-A Attending Glendy Hayden MD   Hosp Day # 6 PCP NEELIMA JEFFERSON     Subjective:  Josep Frost is a(n) 60 year old male remains afebrile  Continues to feel better every day  Walking in the halls today believes he required 1 to 2 L  Rare coughing ongoing lower extremity edema    Objective:  BP (!) 165/79 (BP Location: Right arm)   Pulse 66   Temp 97.9 °F (36.6 °C) (Oral)   Resp 25   Ht 6' 3\" (1.905 m)   Wt (!) 382 lb 0.9 oz (173.3 kg)   SpO2 90%   BMI 47.75 kg/m² currently on 1 L      Temp (24hrs), Av.9 °F (36.6 °C), Min:97.6 °F (36.4 °C), Max:98.4 °F (36.9 °C)      Intake/Output:    Intake/Output Summary (Last 24 hours) at 2025 1442  Last data filed at 2025 1047  Gross per 24 hour   Intake 2060 ml   Output 3250 ml   Net -1190 ml       Physical Exam:   General: alert, cooperative, oriented.  No respiratory distress.   Head: Normocephalic, without obvious abnormality, atraumatic.   Throat: Lips, mucosa, and tongue normal.  No thrush noted.   Neck: trachea midline, no adenopathy, no thyromegaly. No JVD.   Lungs: Diminished excursion remains with slight basilar rales bilaterally   Chest wall: No tenderness or deformity.   Heart: Appears regular rate and rhythm distant tones murmur noted   Abdomen: soft, non-distended, no masses, no guarding, no     Rebound.  Remains very protuberant nontender   Extremity: Ongoing +2 or more edema-left leg is weeping   Skin: No rashes or lesions.   Neurological: Alert, interactive, no focal deficits    Lab Data Review:  Recent Labs     25  0639 25  0823   WBC 11.2* 9.3   HGB 11.1* 12.1*   .0 213.0     Recent Labs     25  0639 25  1912 25  0444 25  1557 25  0638    143 144  145 144 143  143   K 3.4* 3.2* 3.4*  3.4*  3.4* 3.8 3.2*  3.2*  3.2*   CL 99 98 100  100 98 98   98   CO2 34.0* 35.0* 36.0*  36.0* 36.0* 35.0*  35.0*   BUN 29* 28* 27*  28* 27* 28*  28*   CREATSERUM 1.23 1.22 1.23  1.23 1.28 1.21  1.21     Recent Labs   Lab 06/14/25  0419 06/15/25  0159 06/15/25  0840 06/16/25  0408 06/16/25  1153 06/16/25  1847 06/17/25  0111   PTP 15.6* 16.1*  --  14.2  --   --   --    INR 1.23* 1.27*  --  1.09  --   --   --    PTT 24.0  --    < > 36.2* 32.6 34.6 41.6*    < > = values in this interval not displayed.     Recent Labs   Lab 06/19/25  0753   ABGPHT 7.47*   COYDWI7T 58*   MJCAX8B 79*   ABGHCO3 37.2*   ABGBE 15.8*   TEMP 98.6   DUNG Positive   SITE Left Radial   DEV Nasal cannula   THGB 12.7*         Cultures: remains neg /pending     Radiology:  No results found.  reviewed  Recent Labs   Lab 06/19/25  0753   ABGPHT 7.47*   RLSWAL6N 58*   RRKUQ3W 79*   ABGHCO3 37.2*   ABGBE 15.8*   TEMP 98.6   DUNG Positive   SITE Left Radial   DEV Nasal cannula   THGB 12.7*           Medications reviewed     Assessment and Plan:   Problem List[1]    Assessment:  Acute hypoxic/hypercapnic respiratory failure/mechanical ventilation-now extubated to nasal cannula   Status post PEA arrest --suspected anaphylaxis to Definity --completed targeted temperature protocol now awake and alert and hemodynamically stable   acute on chronic hypercapnic respiratory failure-bicarb in February elevated-compatible with EL/OHS-reports compliance at home with initial study 2010-repeat with AHI 38 point--remains with mild hypercapnia-download today on CWP 15 with AHI of 0.8-uses [employer]--has 2 L bled in here  Atrial fibrillation with RVR history of ablation: Rate controlled-sinus at times  Non-STEMI type II demand ischemia  Severe obesity  ROSEMARY-some waxing and waning  Pulmonary infiltrate possible aspiration pneumonia plans to complete course- x-ray improving           Plan:  Wean FiO2 to keep oxygen saturation between 90% 92%  Completed Unasyn   Continues with volume load anticipate need for  diuresis-following kidney function  Resume nocturnal CPAP follow-up for need to accelerate-to get zhtunlbf-xwfrowkxy-nlndpi titration study once recovered for possible role of BiPAP-recheck ABG  Reviewed with patient and family at bedside      CC     Rabia Pierre MD  6/19/2025  2:42 PM         [1]   Patient Active Problem List  Diagnosis    Cardiac arrest (HCC)    NSTEMI (non-ST elevated myocardial infarction) (HCC)    Atrial fibrillation (HCC)    Morbid obesity (HCC)    ROSEMARY (acute kidney injury)    Acute respiratory failure with hypoxia (HCC)    EL (obstructive sleep apnea)

## 2025-06-19 NOTE — PHYSICAL THERAPY NOTE
PHYSICAL THERAPY TREATMENT NOTE - INPATIENT    Room Number: 2621/2621-A     Session: 1     Number of Visits to Meet Established Goals: 5    Presenting Problem: cardiac arrest  Co-Morbidities : a-fib    PHYSICAL THERAPY MEDICAL/SOCIAL HISTORY  History related to current admission: Patient is a 60 year old male admitted on 6/13/2025 from a facility where pt was having an echocardiogram for cardiac arrest.     HOME SITUATION  Type of Home: House  Home Layout: Two level, Able to live on main level  Stairs to Enter : 2   Railing: Yes              Lives With: Spouse    Drives: Yes   Patient Regularly Uses: Glasses       Prior Level of Glenn: independent, works for [employer]       PHYSICAL THERAPY ASSESSMENT   Patient demonstrates excellent progress this session, goals  remain in progress.      Patient is requiring supervision as a result of the following impairments: increased O2 needs from baseline.     Patient continues to function near baseline with bed mobility, transfers, and gait.  Next session anticipate patient to progress gait, stair negotiation, and standing prolonged periods.  Physical Therapy will continue to follow patient for duration of hospitalization.    Patient continues to benefit from continued skilled PT services: at discharge to promote prior level of function and safety with additional support and return home with home health PT.    PLAN DURING HOSPITALIZATION  Nursing Mobility Recommendation : 1 Assist  PT Device Recommendation: Rolling walker  PT Treatment Plan: Bed mobility, Body mechanics, Endurance, Energy conservation, Patient education, Family education, Gait training, Stair training, Transfer training, Balance training  Frequency (Obs): 5x/week     CURRENT GOALS     Goal #1 Patient is able to demonstrate supine - sit EOB @ level: modified independent      Goal #2 Patient is able to demonstrate transfers Sit to/from Stand at assistance level: modified independent      Goal #3  Patient is able to ambulate 150 feet with assist device: walker - rolling at assistance level: modified independent      Goal #4 Pt to be able to ascend/descend 2 steps with use of rail and supervision   Goal #5     Goal #6     Goal Comments: Goals established on 6/17/2025 6/19/2025 all goals ongoing    SUBJECTIVE  \"I am doing ok, lets get it!\"    OBJECTIVE  Precautions: Bed/chair alarm    WEIGHT BEARING RESTRICTION     PAIN ASSESSMENT   Rating: Unable to rate  Location: chest  Management Techniques: Relaxation, Repositioning, Body mechanics    BALANCE                                                                                                                       Static Sitting: Fair +  Dynamic Sitting: Fair           Static Standing: Poor +  Dynamic Standing: Poor    ACTIVITY TOLERANCE                         O2 WALK  Oxygen Therapy  SPO2% on Room Air at Rest: 91  SPO2% Ambulation on Oxygen: 88 (recovers to 92%)  Ambulation oxygen flow (liters per minute): 3    AM-PAC '6-Clicks' INPATIENT SHORT FORM - BASIC MOBILITY  How much difficulty does the patient currently have...  Patient Difficulty: Turning over in bed (including adjusting bedclothes, sheets and blankets)?: A Little   Patient Difficulty: Sitting down on and standing up from a chair with arms (e.g., wheelchair, bedside commode, etc.): A Little   Patient Difficulty: Moving from lying on back to sitting on the side of the bed?: A Little   How much help from another person does the patient currently need...   Help from Another: Moving to and from a bed to a chair (including a wheelchair)?: A Little   Help from Another: Need to walk in hospital room?: A Little   Help from Another: Climbing 3-5 steps with a railing?: A Little     AM-PAC Score:  Raw Score: 18   Approx Degree of Impairment: 46.58%   Standardized Score (AM-PAC Scale): 43.63   CMS Modifier (G-Code): CK    FUNCTIONAL ABILITY STATUS  Gait Assessment   Functional Mobility/Gait Assessment  Gait  Assistance: Supervision  Distance (ft): 125 x 2  Assistive Device: Rolling walker  Pattern: Shuffle    Skilled Therapy Provided    Bed Mobility:  Rolling: NT   Supine<>Sit: NT   Sit<>Supine: NT     Transfer Mobility:  Sit<>Stand: IND   Stand<>Sit: IND   Gait: SBA with RW    Therapist's Comments: educated on pursed lip breathing, IS usage, and postural mechanics      Patient End of Session: Up in chair, Needs met, Call light within reach, RN aware of session/findings, All patient questions and concerns addressed, Hospital anti-slip socks    PT Session Time: 15 minutes  Gait Training: 15 minutes  Therapeutic Activity: 0 minutes  Therapeutic Exercise: 0 minutes   Neuromuscular Re-education: 0 minutes

## 2025-06-19 NOTE — RESPIRATORY THERAPY NOTE
EL - Equipment Use Daily Summary:                  . Set Mode: CPAP                . Usage in hours: 8.6                . 90% Pressure (EPAP) level: 15                . 90% Insp.Pressure (IPAP):                . AHI: 0.3                . Supplemental Oxygen:  2  LPM                . Comments:

## 2025-06-20 LAB
ALBUMIN SERPL-MCNC: 4.6 G/DL (ref 3.2–4.8)
ALBUMIN/GLOB SERPL: 1.8 {RATIO} (ref 1–2)
ALP LIVER SERPL-CCNC: 88 U/L (ref 45–117)
ALT SERPL-CCNC: 51 U/L (ref 10–49)
ANION GAP SERPL CALC-SCNC: 10 MMOL/L (ref 0–18)
ANION GAP SERPL CALC-SCNC: 10 MMOL/L (ref 0–18)
ANION GAP SERPL CALC-SCNC: 9 MMOL/L (ref 0–18)
AST SERPL-CCNC: 56 U/L (ref ?–34)
ATRIAL RATE: 103 BPM
BILIRUB SERPL-MCNC: 0.8 MG/DL (ref 0.2–1.1)
BUN BLD-MCNC: 32 MG/DL (ref 9–23)
BUN BLD-MCNC: 32 MG/DL (ref 9–23)
BUN BLD-MCNC: 36 MG/DL (ref 9–23)
CALCIUM BLD-MCNC: 9.2 MG/DL (ref 8.7–10.6)
CALCIUM BLD-MCNC: 9.6 MG/DL (ref 8.7–10.6)
CALCIUM BLD-MCNC: 9.6 MG/DL (ref 8.7–10.6)
CHLORIDE SERPL-SCNC: 101 MMOL/L (ref 98–112)
CHLORIDE SERPL-SCNC: 98 MMOL/L (ref 98–112)
CHLORIDE SERPL-SCNC: 98 MMOL/L (ref 98–112)
CO2 SERPL-SCNC: 31 MMOL/L (ref 21–32)
CO2 SERPL-SCNC: 33 MMOL/L (ref 21–32)
CO2 SERPL-SCNC: 33 MMOL/L (ref 21–32)
CREAT BLD-MCNC: 1.44 MG/DL (ref 0.7–1.3)
EGFRCR SERPLBLD CKD-EPI 2021: 56 ML/MIN/1.73M2 (ref 60–?)
GLOBULIN PLAS-MCNC: 2.5 G/DL (ref 2–3.5)
GLUCOSE BLD-MCNC: 122 MG/DL (ref 70–99)
GLUCOSE BLD-MCNC: 122 MG/DL (ref 70–99)
GLUCOSE BLD-MCNC: 125 MG/DL (ref 70–99)
GLUCOSE BLD-MCNC: 126 MG/DL (ref 70–99)
GLUCOSE BLD-MCNC: 131 MG/DL (ref 70–99)
GLUCOSE BLD-MCNC: 133 MG/DL (ref 70–99)
GLUCOSE BLD-MCNC: 97 MG/DL (ref 70–99)
MAGNESIUM SERPL-MCNC: 2.2 MG/DL (ref 1.6–2.6)
MAGNESIUM SERPL-MCNC: 2.2 MG/DL (ref 1.6–2.6)
OSMOLALITY SERPL CALC.SUM OF ELEC: 300 MOSM/KG (ref 275–295)
OSMOLALITY SERPL CALC.SUM OF ELEC: 300 MOSM/KG (ref 275–295)
OSMOLALITY SERPL CALC.SUM OF ELEC: 302 MOSM/KG (ref 275–295)
POTASSIUM SERPL-SCNC: 3.5 MMOL/L (ref 3.5–5.1)
POTASSIUM SERPL-SCNC: 3.7 MMOL/L (ref 3.5–5.1)
POTASSIUM SERPL-SCNC: 4.5 MMOL/L (ref 3.5–5.1)
PROT SERPL-MCNC: 7.1 G/DL (ref 5.7–8.2)
Q-T INTERVAL: 452 MS
QRS DURATION: 150 MS
QTC CALCULATION (BEZET): 592 MS
R AXIS: 83 DEGREES
SODIUM SERPL-SCNC: 141 MMOL/L (ref 136–145)
T AXIS: 16 DEGREES
VENTRICULAR RATE: 103 BPM

## 2025-06-20 PROCEDURE — 99233 SBSQ HOSP IP/OBS HIGH 50: CPT | Performed by: HOSPITALIST

## 2025-06-20 PROCEDURE — 99232 SBSQ HOSP IP/OBS MODERATE 35: CPT | Performed by: INTERNAL MEDICINE

## 2025-06-20 RX ORDER — POTASSIUM CHLORIDE 1500 MG/1
40 TABLET, EXTENDED RELEASE ORAL ONCE
Status: COMPLETED | OUTPATIENT
Start: 2025-06-20 | End: 2025-06-20

## 2025-06-20 RX ORDER — POTASSIUM CHLORIDE 1500 MG/1
40 TABLET, EXTENDED RELEASE ORAL EVERY 4 HOURS
Status: COMPLETED | OUTPATIENT
Start: 2025-06-20 | End: 2025-06-20

## 2025-06-20 NOTE — RESPIRATORY THERAPY NOTE
EL - Equipment Use Daily Summary:                  . Set Mode: CPAP                . Usage in hours: 4.9                . 90% Pressure (EPAP) level: 15                . 90% Insp.Pressure (IPAP):                . AHI: 0.6                . Supplemental Oxygen:   2 LPM                . Comments:

## 2025-06-20 NOTE — PROGRESS NOTES
Progress Note  Josepaneudy Frost Patient Status:  Inpatient    1965 MRN [id number]   Location [facility] 2NE-A Attending Glendy Hayden MD   Hosp Day # 7 PCP NEELIMA JEFFERSON     Subjective:  Several episodes of atrial tachycardia/a-fib overnight, converted back to NSR around 0615 and currently sustaining.  Denies any cardiac symptoms at this time, worked with PT this morning, tolerated well.        Objective:  /72 (BP Location: Left arm)   Pulse 72   Temp 98.1 °F (36.7 °C) (Oral)   Resp 17   Ht 6' 3\" (1.905 m)   Wt (!) 383 lb 9.6 oz (174 kg)   SpO2 93%   BMI 47.95 kg/m²     Telemetry: SR, AT/AF, HR 70s       Intake/Output:    Intake/Output Summary (Last 24 hours) at 2025 1038  Last data filed at 2025 0746  Gross per 24 hour   Intake 1202 ml   Output 2100 ml   Net -898 ml       Last 3 Weights   25 0333 (!) 383 lb 9.6 oz (174 kg)   25 0813 (!) 382 lb 0.9 oz (173.3 kg)   25 0522 (!) 380 lb 6.4 oz (172.5 kg)   25 0000 (!) 414 lb 7.4 oz (188 kg)   25 0100 (!) 435 lb 3 oz (197.4 kg)   25 0400 (!) 411 lb 9.6 oz (186.7 kg)   06/15/25 0400 (!) 405 lb 10.3 oz (184 kg)   25 2045 (!) 403 lb 7.1 oz (183 kg)   25 1512 (!) 380 lb (172.4 kg)   25 1207 (!) 380 lb (172.4 kg)       Labs:  Recent Labs   Lab 25  0638 25  1454 25  1602 25  0419   *  108*  --  103* 122*  122*   BUN 28*  28*  --  26* 32*  32*   CREATSERUM 1.21  1.21  --  1.37* 1.44*  1.44*   EGFRCR 69  69  --  59* 56*  56*   CA 9.4  9.4  --  9.4 9.6  9.6     143  --  140 141  141   K 3.2*  3.2*  3.2* 3.7 3.8 3.5  3.5  3.5   CL 98  98  --  97* 98  98   CO2 35.0*  35.0*  --  33.0* 33.0*  33.0*     Recent Labs   Lab 25  0408 25  0639 25  0823   RBC 3.71* 3.60* 3.96*   HGB 11.2* 11.1* 12.1*   HCT 32.9* 32.1* 36.4*   MCV 88.7 89.2 91.9   MCH 30.2 30.8 30.6   MCHC 34.0 34.6 33.2   RDW 13.6 13.6 13.3   NEPRELIM 8.95*  8.87* 7.02   WBC 10.7 11.2* 9.3   .0 205.0 213.0         Recent Labs   Lab 06/13/25  1656 06/13/25 2056 06/14/25  0419   TROPHS 105* 1,120* 786*       Review of Systems   Constitutional: Negative.   Cardiovascular: Negative.    Respiratory: Negative.         Physical Exam:    Gen: alert, oriented x 3, NAD  Heent: pupils equal, reactive. Mucous membranes moist.   Neck: no jvd  Cardiac: regular rate and rhythm, normal S1,S2, no murmur, gallop or  rub   Lungs: CTA  Abd: soft, NT/ND +bs  Ext: trace of lower extremities edema  Skin: Warm, dry  Neuro: No focal deficits      Medications:    Scheduled Medications[1]  Medication Infusions[2]    Assessment:  Cardiopulmonary Arrest, PEA then Asystole    ? Anaphylactic Reaction - Hypoxia, arrest s/p Definity w/echo  Required multiple rounds of CPR  Acute Hypoxic/Hypercapneic Respiratory Failure, Poss Aspiration PNA  Now extubated - decreased O2 requirements  On antibx  [facility]AP at [facility] following  Elevated Troponin, Type II  Trop 105 > 1120 > 786 - likely supply/demand d/t above  Pt reports prior stress test w/normal perfusion 2021  Volume Overload, Acute on Chronic HFpEF   Diuresis w/IV lasix, negative net neg 3.4L  Echo w/LVEF 55-60%, no WMA, normal LA size reported  Paroxysmal Atrial Fibrillation/Flutter  Hx Prior AFib/flutter Ablation 4/2025, DCCV 5/2025  Initially w/brief PAF episodes and s/p IV amiodarone - now off  Hx on flecainide prior to admit, will keep off due to recent IV Amio, can restart outpatient in 1 month  Maintaining NSR - on BB along for now   TSH WNL  [id number] 1 (HTN) - on xarelto 20mg daily  ROSEMARY - Cr improved w/diuresis, now trending up again will hold lasix    Morbid Obesity, BMI 47.9  EL - CPAP    Plan:  Diuresed well with IV lasix, net neg fluid 3.5L, appears euvolemic this morning, renal function trending up will hold off further IV diuresis and plan to resume on oral lasix starting later today.   Paroxysmal atrial tachycardia/a-fib  overnight and this morning-converted back to NSR and currently sustaining-continue bb, plan to resume flecainide in 1 month-will review with EP since recurrence of atrial rhythm again.  Continue xarelto for stroke prevention.   No ischemic work up planned at this time per Dr Fu.   Discharge planing in progress pending course.       Plan of care discussed with patient, RN.    Miroslava LuisOLIMPIA lizarraga  6/20/2025  10:38 AM  [phone]        Patient seen and examined independently.  Note reviewed and labs reviewed.  Agree to the assessment and plan with the following additions/changes.  Entire medical decision making & plan performed by myself.    A/P:  Cardiopulmonary arrest secondary to hypoxia (06/13)  Reaction (anaphylactic ?) following Definity injection  T2MI, PAF, Morbid obesity  Extubated with good neurologic recovery (06/16)  Still volume overloaded. Good response to diuresis  Switch to PO lasix in the next 48hr  PAF. Flecainide to be held for 1 month following amiodarone load as per Dr. Reinoso  No plans for ischemic eval this admission  Getting close to discharge    LOC L3    Baljinder Fu MD  6/20/2025  Interventional Cardiology  [facility] [place]  =======================================================           [1]    acetaminophen  500 mg Oral TID    metoprolol succinate ER  50 mg Oral BID    rivaroxaban  20 mg Oral Daily with food    multivitamin  1 tablet Oral Daily    furosemide  60 mg Intravenous BID (Diuretic)    insulin aspart  2-10 Units Subcutaneous TID CC and HS   [2]    dextrose 10%

## 2025-06-20 NOTE — PROGRESS NOTES
[facility]   part of [facility]     Hospitalist Progress Note     Josep Frost Patient Status:  Inpatient    1965 MRN [id number]   Location [facility] 4SW-A Attending Jumana Doe MD   Hosp Day # 7 PCP NEELIMA JEFFERSON     Chief Complaint: cardiac arrest    Subjective:     Patient is sitting up in the chair  No complaints    Objective:    Review of Systems:   Negative  Wt 435 from 405    Vital signs:  Temp:  [97.5 °F (36.4 °C)-98.1 °F (36.7 °C)] 98.1 °F (36.7 °C)  Pulse:  [] 72  Resp:  [17-26] 17  BP: (124-165)/(70-85) 130/72  SpO2:  [79 %-95 %] 93 %    Physical Exam:    General: stable  Respiratory: No wheezes, no rhonchi  Cardiovascular: S1, S2, regular rate and rhythm  Abdomen: Soft, Non-tender, non-distended, positive bowel sounds  Neuro: No new focal deficits.   Extremities: No edema      Diagnostic Data:    Labs:  Recent Labs   Lab 25  1656 25  0419 06/15/25  0159 06/15/25  0958 25  0408 25  0639 25  0823   WBC 23.9* 32.9* 21.9* 13.5* 10.7 11.2* 9.3   HGB 15.6 15.6 13.7 12.5* 11.2* 11.1* 12.1*   MCV 93.1 89.9 88.6 88.0 88.7 89.2 91.9   .0 349.0 214.0 172.0 163.0 205.0 213.0   BAND 8 11  --   --   --   --   --    INR 1.41* 1.23* 1.27*  --  1.09  --   --        Recent Labs   Lab 25  0444 25  1557 25  0638 25  1454 25  1602 25  0419   *  104*   < > 108*  108*  --  103* 122*  122*   BUN 27*  28*   < > 28*  28*  --  26* 32*  32*   CREATSERUM 1.23  1.23   < > 1.21  1.21  --  1.37* 1.44*  1.44*   CA 9.1  9.1   < > 9.4  9.4  --  9.4 9.6  9.6   ALB 4.3  --  4.4  --   --  4.6     145   < > 143  143  --  140 141  141   K 3.4*  3.4*  3.4*   < > 3.2*  3.2*  3.2* 3.7 3.8 3.5  3.5  3.5     100   < > 98  98  --  97* 98  98   CO2 36.0*  36.0*   < > 35.0*  35.0*  --  33.0* 33.0*  33.0*   ALKPHO 78  --  81  --   --  88   AST 40*  --  48*  --   --  56*   ALT 30  --  38  --   --   51*   BILT 0.6  --  0.7  --   --  0.8   TP 6.7  --  6.8  --   --  7.1    < > = values in this interval not displayed.       Estimated Glomerular Filtration Rate: 56 mL/min/1.73m2 (A) (result from lab).    Recent Labs   Lab 06/13/25  1656 06/13/25 2056 06/14/25  0419   TROPHS 105* 1,120* 786*       Recent Labs   Lab 06/14/25  0419 06/15/25  0159 06/16/25  0408   PTP 15.6* 16.1* 14.2   INR 1.23* 1.27* 1.09                    Microbiology    Hospital Encounter on 06/13/25   1. Blood Culture     Status: None    Collection Time: 06/14/25  2:59 PM    Specimen: Blood,peripheral   Result Value Ref Range    Blood Culture Result No Growth 5 Days N/A         Imaging: Reviewed in Epic.    Medications: Scheduled Medications[1]    Assessment & Plan:      # PEA arrest    - thought 2/2 definity contrast reaction    - EKG on admission with Afib with RBBB (seen on prior)   - TTE with limited views, though preserved EF, no tamponade physiology or significant valvular abnormalities     - CTA negative for PE   - CTH unremarkable   - Empiric IV abx completed  -wbc better  -VSS  -wean steroids        # Afib  and A Flutter  -s/p A flutter ablation 4/10      - s/p  cardioversion od afib 5/13/2025   -plan to restart Flecainide in 1 mo for p Afib     # NSTEMI, likely type II   - continue trending trops    - cardiology following     # Morbid obesity, BMI 50  Needs to lose 150 lbs, referral to wt loss clinic    #PreDM, A1c 6.0    # Leukocytosis  Improved         Supplementary Documentation:     Quality:  DVT Mechanical Prophylaxis:   SCDs, Early ambuation  DVT Pharmacologic Prophylaxis   Medication    rivaroxaban (Xarelto) tab 20 mg                Code Status: Full Code  [place]: No urinary catheter in place  [place] Duration (in days): 2  Central line:    DERIC:     Discharge is dependent on: progress  At this point Mr. Frost is expected to be discharge to: tbd    The 21st Century Cures Act makes medical notes like these available to patients in  the interest of transparency. Please be advised this is a medical document. Medical documents are intended to carry relevant information, facts as evident, and the clinical opinion of the practitioner. The medical note is intended as peer to peer communication and may appear blunt or direct. It is written in medical language and may contain abbreviations or verbiage that are unfamiliar.                         [1]    acetaminophen  500 mg Oral TID    metoprolol succinate ER  50 mg Oral BID    rivaroxaban  20 mg Oral Daily with food    multivitamin  1 tablet Oral Daily    furosemide  60 mg Intravenous BID (Diuretic)    insulin aspart  2-10 Units Subcutaneous TID CC and HS

## 2025-06-20 NOTE — PHYSICAL THERAPY NOTE
PHYSICAL THERAPY TREATMENT NOTE - INPATIENT    Room Number: 2621/2621-A     Session: 2     Number of Visits to Meet Established Goals: 5    Presenting Problem: cardiac arrest  Co-Morbidities : a-fib    PHYSICAL THERAPY ASSESSMENT   Patient demonstrates good  progress this session, goalsmet.      Patient is currently functioning near baseline with bed mobility, transfers, gait, and stair negotiation.    Patient currently does not meet criteria for skilled inpatient physical therapy services, however patient will continue to benefit from QID ambulation with RW and nursing staff as needed.  Pt is discharged from IP PT services.  RN aware to re-order if there is a decline in mobility status.        PLAN DURING HOSPITALIZATION  Nursing Mobility Recommendation : 1 Assist  PT Device Recommendation: Rolling walker  PT Treatment Plan: Bed mobility, Body mechanics, Endurance, Energy conservation, Patient education, Family education, Gait training, Stair training, Transfer training, Balance training  Frequency (Obs): 5x/week     CURRENT GOALS     Goal #1 Patient is able to demonstrate supine - sit EOB @ level: modified independent  Not addressed this session   Goal #2 Patient is able to demonstrate transfers Sit to/from Stand at assistance level: modified independent  Met this session   Goal #3 Patient is able to ambulate 150 feet with assist device: walker - rolling at assistance level: modified independent  Met this session   Goal #4 Pt to be able to ascend/descend 2 steps with use of rail and supervision  Met this session   Goal #5     Goal #6     Goal Comments: Goals established on 6/17/2025    PHYSICAL THERAPY MEDICAL/SOCIAL HISTORY  History related to current admission: Patient is a 60 year old male admitted on 6/13/2025 from a facility where pt was having an echocardiogram for cardiac arrest.     HOME SITUATION  Type of Home: House  Home Layout: Two level, Able to live on main level  Stairs to Enter : 2   Railing: Yes          Lives With: Spouse    Drives: Yes   Patient Regularly Uses: Glasses       Prior Level of [place]: independent, works for [employer], IT      SUBJECTIVE  \"That's my [employer]\"  \"I can take breaks to catch my breath at home\"  Pt notes some chest pain, does not report exacerbation with activity.    OBJECTIVE  Precautions: Bed/chair alarm    WEIGHT BEARING RESTRICTION     PAIN ASSESSMENT   Rating: Unable to rate  Location: chest  Management Techniques: Repositioning    BALANCE                                                                                                                       Static Sitting: Fair +  Dynamic Sitting: Fair           Static Standing: Fair -  Dynamic Standing: Fair -    ACTIVITY TOLERANCE                         O2 WALK       AM-PAC '6-Clicks' INPATIENT SHORT FORM - BASIC MOBILITY  How much difficulty does the patient currently have...  Patient Difficulty: Turning over in bed (including adjusting bedclothes, sheets and blankets)?: None   Patient Difficulty: Sitting down on and standing up from a chair with arms (e.g., wheelchair, bedside commode, etc.): None   Patient Difficulty: Moving from lying on back to sitting on the side of the bed?: None   How much help from another person does the patient currently need...   Help from Another: Moving to and from a bed to a chair (including a wheelchair)?: None   Help from Another: Need to walk in hospital room?: None   Help from Another: Climbing 3-5 steps with a railing?: A Little     AM-PAC Score:  Raw Score: 23   Approx Degree of Impairment: 11.2%   Standardized Score (AM-PAC Scale): 56.93   CMS Modifier (G-Code): CI    FUNCTIONAL ABILITY STATUS  Gait Assessment   Functional Mobility/Gait Assessment  Gait Assistance: Modified independent  Distance (ft): 290  Assistive Device: Rolling walker  Pattern: Shuffle  Stairs: Stairs  How Many Stairs: 1 flight  Device: 2 Rails  Assist: Supervision  Pattern: Ascend and Descend  Ascend and  Descend : Step to    Skilled Therapy Provided    Bed Mobility:  Rolling: NT   Supine<>Sit: NT   Sit<>Supine: NT     Transfer Mobility:  Sit<>Stand: Mod-I with RW   Stand<>Sit: Mod-I with RW   Gait: ~290ft Mod-I with RW, independently takes short standing rest breaks as needed for SOB.     Therapist's Comments: Pt received in bedside chair, pleasant and agreeable to PT session. Pt demonstrates no need for cueing during transfers and gait with RW, able to independently verbalize appropriate energy conservation techniques upon dc to home for gait and stair navigation. Pt navigates 1 flight of stairs ascending and descending with use of 2 hand rails and step to pattern, displays some SOB, recovers quickly with short standing rest break before returning to room. PT Aide vended RW for home use after dc. Pt returned to bedside chair at end of session, educated to ambulate regularly with RW and call nursing staff for assistance as needed.      Patient End of Session: Up in chair, Needs met, Call light within reach, RN aware of session/findings, All patient questions and concerns addressed, Hospital anti-slip socks, Family present    PT Session Time: 15 minutes  Gait Training: 15 minutes

## 2025-06-20 NOTE — OCCUPATIONAL THERAPY NOTE
OCCUPATIONAL THERAPY TREATMENT NOTE - INPATIENT     Room Number: 2621/2621-A  Session: 1   Number of Visits to Meet Established Goals: 7    Presenting Problem: PEA arrest, NSTEMI, respiratory failure    History Related to Current Admission: Patient is a 60 year old male admitted on 6/13/2025 with Presenting Problem: PEA arrest. Co-Morbidities : a-fib    HOME SITUATION  Type of Home: House  Home Layout: Two level, Able to live on main level  Lives With: Spouse     Toilet and Equipment: Standard height toilet  Shower/Tub and Equipment: Walk-in shower  Other Equipment: None     Occupation/Status:   Hand Dominance: Right  Drives: Yes  Patient Regularly Uses: Glasses     Prior Level of Function: independent with all ADL/IADLs without use of adaptive device.  Patient's job requires frequent travel.  Patient's family is supportive and will be available to assist prn.      ASSESSMENT   Patient demonstrates excellent progress this session, goals updated to reflect patient performance.    Patient continues to function near baseline with toileting, bathing, upper body dressing, lower body dressing, bed mobility, and transfers.   Pt has met all OT goals at the current level of care. Pt was seen for a total of 1 visit. Treatment consisted of neuromuscular reeducation including therapeutic exercise to improve range of motion, strength, and coordination; functional mobility training; self-care training; caregiver instruction; and home exercise program instruction. Pt has made functional progress in the areas of: toileting, dressing, functional mobility and B UE strengthening. Pt will be discharged from skilled OT services at this time. Pt will be discharged home with HHOT. Recommend pt have family assist as needed. Please re-order OT should a new functional limitation arise during this admission.      Patient continues to benefit from continued skilled OT services: at discharge to promote prior level of function.   Anticipate patient will return home with home health OT.         WEIGHT BEARING RESTRICTION       Recommendations for nursing staff:   Transfers: supervision with RW  Toileting location: toilet    TREATMENT SESSION:  Patient Start of Session: Pt was found sitting in his chair with his wife at the bedside.     FUNCTIONAL TRANSFER ASSESSMENT  Sit to Stand: Chair  Edge of Bed: Supervision  Chair: Minimal Assist  Toilet Transfer: Supervision    BED MOBILITY     BALANCE ASSESSMENT  Static Sitting: Supervision  Static Standing: Contact Guard Assist    FUNCTIONAL ADL ASSESSMENT  Eating: Modified Independent  Grooming Seated: Supervision  LB Dressing Seated: Moderate Assist  Toileting Seated: Supervision  Toileting Standing: Supervision    ACTIVITY TOLERANCE:                         O2 SATURATIONS       EDUCATION PROVIDED  Patient Education : Role of Occupational Therapy; Plan of Care; Discharge Recommendations; Fall Prevention; Functional Transfer Techniques  Patient's Response to Education: Verbalized Understanding; Returned Demonstration  Family/Caregiver Education : Role of Occupational Therapy; Plan of Care; Discharge Recommendations  Family/Caregiver's Response to Education: Verbalized Understanding    Equipment used: RW, gait belt   Demonstrates functional use, Would benefit from additional trial          UPPER EXTREMITY  ROM: within functional limits   Strength: is within functional limits     Therapist comments: sitting in chair>simulated LB dressing to doff/don non-skid socks>stand to RW>walk to bathroom>toilet T/F>stand to RW>walk to sit in chair       Patient End of Session: Up in chair, Needs met, Call light within reach, RN aware of session/findings, All patient questions and concerns addressed, Hospital anti-slip socks    SUBJECTIVE  \"I have had this chest pain since the CPR.\"     PAIN ASSESSMENT  Ratin  Location: chest  Management Techniques: Repositioning, Relaxation     OBJECTIVE  Precautions:  Bed/chair alarm, Aspiration    AM-PAC ‘6-Clicks’ Inpatient Daily Activity Short Form  -   Putting on and taking off regular lower body clothing?: A Little  -   Bathing (including washing, rinsing, drying)?: A Little  -   Toileting, which includes using toilet, bedpan or urinal? : A Little  -   Putting on and taking off regular upper body clothing?: A Little  -   Taking care of personal grooming such as brushing teeth?: None  -   Eating meals?: None    AM-PAC Score:  Score: 20  Approx Degree of Impairment: 38.32%  Standardized Score (AM-PAC Scale): 42.03    PLAN  OT Device Recommendations: TBD  OT Treatment Plan: Balance activities, Energy conservation/work simplification techniques, ADL training, Functional transfer training, UE strengthening/ROM, Endurance training, Patient/Family education, Patient/Family training, Cognitive reorientation, Equipment eval/education, Compensatory technique education  Rehab Potential : Good  Frequency: 3-5x/week    OT Goals: all goals met 6/20, LS  ADL Goals  Patient will perform toileting with supervision and AE PRN  Patient will perform LB dressing with supervision and AE PRN     Functional Transfer Goals  Patient will perform bed mobility supine to sit with supervision  Patient will perform bed mobility sit to supine with supervision  Patient will perform toilet transfer with supervision      OT Session Time: 30 minutes  Therapeutic Activity: 30 minutes

## 2025-06-20 NOTE — PLAN OF CARE
Assumed care of patient at 1930.   A&Ox4, forgetful at times, spouse at the bedside  Spo2 WNL 1-2L with NC during the day, wears CPAP at night, denies shortness of breath  NSR on tele, rates between 50-60s at rest, up to 150s upon activity with on & off AFIB. No C/O dizziness or palpitations. Midsternal pain managed with scheduled & PRN medications . BLE pitting edema present  Continent of B/B  Up standby, call light within reach  Patient tolerating care & able to make needs known     POC;   - IV lasix BID   - HR control   - wean 02    Problem: CARDIOVASCULAR - ADULT  Goal: Maintains optimal cardiac output and hemodynamic stability  Description: INTERVENTIONS:  - Monitor vital signs, rhythm, and trends  - Monitor for bleeding, hypotension and signs of decreased cardiac output  - Evaluate effectiveness of vasoactive medications to optimize hemodynamic stability  - Monitor arterial and/or venous puncture sites for bleeding and/or hematoma  - Assess quality of pulses, skin color and temperature  - Assess for signs of decreased coronary artery perfusion - ex. Angina  - Evaluate fluid balance, assess for edema, trend weights  Outcome: Progressing  Goal: Absence of cardiac arrhythmias or at baseline  Description: INTERVENTIONS:  - Continuous cardiac monitoring, monitor vital signs, obtain 12 lead EKG if indicated  - Evaluate effectiveness of antiarrhythmic and heart rate control medications as ordered  - Initiate emergency measures for life threatening arrhythmias  - Monitor electrolytes and administer replacement therapy as ordered  Outcome: Progressing     Problem: RESPIRATORY - ADULT  Goal: Achieves optimal ventilation and oxygenation  Description: INTERVENTIONS:  - Assess for changes in respiratory status  - Assess for changes in mentation and behavior  - Position to facilitate oxygenation and minimize respiratory effort  - Oxygen supplementation based on oxygen saturation or ABGs  - Provide Smoking Cessation handout,  if applicable  - Encourage broncho-pulmonary hygiene including cough, deep breathe, Incentive Spirometry  - Assess the need for suctioning and perform as needed  - Assess and instruct to report SOB or any respiratory difficulty  - Respiratory Therapy support as indicated  - Manage/alleviate anxiety  - Monitor for signs/symptoms of CO2 retention  Outcome: Progressing     Problem: METABOLIC/FLUID AND ELECTROLYTES - ADULT  Goal: Glucose maintained within prescribed range  Description: INTERVENTIONS:  - Monitor Blood Glucose as ordered  - Assess for signs and symptoms of hyperglycemia and hypoglycemia  - Administer ordered medications to maintain glucose within target range  - Assess barriers to adequate nutritional intake and initiate nutrition consult as needed  - Instruct patient on self management of diabetes  Outcome: Progressing  Goal: Electrolytes maintained within normal limits  Description: INTERVENTIONS:  - Monitor labs and rhythm and assess patient for signs and symptoms of electrolyte imbalances  - Administer electrolyte replacement as ordered  - Monitor response to electrolyte replacements, including rhythm and repeat lab results as appropriate  - Fluid restriction as ordered  - Instruct patient on fluid and nutrition restrictions as appropriate  Outcome: Progressing  Goal: Hemodynamic stability and optimal renal function maintained  Description: INTERVENTIONS:  - Monitor labs and assess for signs and symptoms of volume excess or deficit  - Monitor intake, output and patient weight  - Monitor urine specific gravity, serum osmolarity and serum sodium as indicated or ordered  - Monitor response to interventions for patient's volume status, including labs, urine output, blood pressure (other measures as available)  - Encourage oral intake as appropriate  - Instruct patient on fluid and nutrition restrictions as appropriate  Outcome: Progressing     Problem: NEUROLOGICAL - ADULT  Goal: Achieves stable or  improved neurological status  Description: INTERVENTIONS  - Assess for and report changes in neurological status  - Initiate measures to prevent increased intracranial pressure  - Maintain blood pressure and fluid volume within ordered parameters to optimize cerebral perfusion and minimize risk of hemorrhage  - Monitor temperature, glucose, and sodium. Initiate appropriate interventions as ordered  Outcome: Progressing  Goal: Absence of seizures  Description: INTERVENTIONS  - Monitor for seizure activity  - Administer anti-seizure medications as ordered  - Monitor neurological status  Outcome: Progressing  Goal: Remains free of injury related to seizure activity  Description: INTERVENTIONS:  - Maintain airway, patient safety  and administer oxygen as ordered  - Monitor patient for seizure activity, document and report duration and description of seizure to MD/LIP  - If seizure occurs, turn patient to side and suction secretions as needed  - Reorient patient post seizure  - Seizure pads on all 4 side rails  - Instruct patient/family to notify RN of any seizure activity  - Instruct patient/family to call for assistance with activity based on assessment  Outcome: Progressing  Goal: Achieves maximal functionality and self care  Description: INTERVENTIONS  - Monitor swallowing and airway patency with patient fatigue and changes in neurological status  - Encourage and assist patient to increase activity and self care with guidance from PT/OT  - Encourage visually impaired, hearing impaired and aphasic patients to use assistive/communication devices  Outcome: Progressing     Problem: Impaired Functional Mobility  Goal: Achieve highest/safest level of mobility/gait  Description: Interventions:  - Assess patient's functional ability and stability  - Promote increasing activity/tolerance for mobility and gait  - Educate and engage patient/family in tolerated activity level and precautions  - Recommend use of  angel-walker for  transfers and ambulation  Outcome: Progressing

## 2025-06-20 NOTE — PLAN OF CARE
Assumed care of patient at change of shift.   Alert and oriented x4.   Sinus rhythm with a BBB and occasional PAC's on tele.   Tolerating room air at rest, CPAP with 2L overnight. Lung sounds diminished bilaterally, no cough noted. Denies shortness of breath and dizziness.   Complaints of intermittent chest pain that worsens with coughing and sneezing. Scheduled pain meds given with relief.   Continent of bowel and bladder.   Safety precautions maintained.    Discussed plan of care with patient. All questions answered.    Problem: CARDIOVASCULAR - ADULT  Goal: Maintains optimal cardiac output and hemodynamic stability  Description: INTERVENTIONS:  - Monitor vital signs, rhythm, and trends  - Monitor for bleeding, hypotension and signs of decreased cardiac output  - Evaluate effectiveness of vasoactive medications to optimize hemodynamic stability  - Monitor arterial and/or venous puncture sites for bleeding and/or hematoma  - Assess quality of pulses, skin color and temperature  - Assess for signs of decreased coronary artery perfusion - ex. Angina  - Evaluate fluid balance, assess for edema, trend weights  Outcome: Progressing  Goal: Absence of cardiac arrhythmias or at baseline  Description: INTERVENTIONS:  - Continuous cardiac monitoring, monitor vital signs, obtain 12 lead EKG if indicated  - Evaluate effectiveness of antiarrhythmic and heart rate control medications as ordered  - Initiate emergency measures for life threatening arrhythmias  - Monitor electrolytes and administer replacement therapy as ordered  Outcome: Progressing     Problem: RESPIRATORY - ADULT  Goal: Achieves optimal ventilation and oxygenation  Description: INTERVENTIONS:  - Assess for changes in respiratory status  - Assess for changes in mentation and behavior  - Position to facilitate oxygenation and minimize respiratory effort  - Oxygen supplementation based on oxygen saturation or ABGs  - Provide Smoking Cessation handout, if  applicable  - Encourage broncho-pulmonary hygiene including cough, deep breathe, Incentive Spirometry  - Assess the need for suctioning and perform as needed  - Assess and instruct to report SOB or any respiratory difficulty  - Respiratory Therapy support as indicated  - Manage/alleviate anxiety  - Monitor for signs/symptoms of CO2 retention  Outcome: Progressing     Problem: GASTROINTESTINAL - ADULT  Goal: Minimal or absence of nausea and vomiting  Description: INTERVENTIONS:  - Maintain adequate hydration with IV or PO as ordered and tolerated  - Nasogastric tube to low intermittent suction as ordered  - Evaluate effectiveness of ordered antiemetic medications  - Provide nonpharmacologic comfort measures as appropriate  - Advance diet as tolerated, if ordered  - Obtain nutritional consult as needed  - Evaluate fluid balance  Outcome: Progressing  Goal: Maintains or returns to baseline bowel function  Description: INTERVENTIONS:  - Assess bowel function  - Maintain adequate hydration with IV or PO as ordered and tolerated  - Evaluate effectiveness of GI medications  - Encourage mobilization and activity  - Obtain nutritional consult as needed  - Establish a toileting routine/schedule  - Consider collaborating with pharmacy to review patient's medication profile  Outcome: Progressing     Problem: METABOLIC/FLUID AND ELECTROLYTES - ADULT  Goal: Glucose maintained within prescribed range  Description: INTERVENTIONS:  - Monitor Blood Glucose as ordered  - Assess for signs and symptoms of hyperglycemia and hypoglycemia  - Administer ordered medications to maintain glucose within target range  - Assess barriers to adequate nutritional intake and initiate nutrition consult as needed  - Instruct patient on self management of diabetes  Outcome: Progressing  Goal: Electrolytes maintained within normal limits  Description: INTERVENTIONS:  - Monitor labs and rhythm and assess patient for signs and symptoms of electrolyte  imbalances  - Administer electrolyte replacement as ordered  - Monitor response to electrolyte replacements, including rhythm and repeat lab results as appropriate  - Fluid restriction as ordered  - Instruct patient on fluid and nutrition restrictions as appropriate  Outcome: Progressing  Goal: Hemodynamic stability and optimal renal function maintained  Description: INTERVENTIONS:  - Monitor labs and assess for signs and symptoms of volume excess or deficit  - Monitor intake, output and patient weight  - Monitor urine specific gravity, serum osmolarity and serum sodium as indicated or ordered  - Monitor response to interventions for patient's volume status, including labs, urine output, blood pressure (other measures as available)  - Encourage oral intake as appropriate  - Instruct patient on fluid and nutrition restrictions as appropriate  Outcome: Progressing     Problem: SKIN/TISSUE INTEGRITY - ADULT  Goal: Skin integrity remains intact  Description: INTERVENTIONS  - Assess and document risk factors for pressure ulcer development  - Assess and document skin integrity  - Monitor for areas of redness and/or skin breakdown  - Initiate interventions, skin care algorithm/standards of care as needed  Outcome: Progressing  Goal: Oral mucous membranes remain intact  Description: INTERVENTIONS  - Assess oral mucosa and hygiene practices  - Implement preventative oral hygiene regimen  - Implement oral medicated treatments as ordered  Outcome: Progressing     Problem: NEUROLOGICAL - ADULT  Goal: Achieves stable or improved neurological status  Description: INTERVENTIONS  - Assess for and report changes in neurological status  - Initiate measures to prevent increased intracranial pressure  - Maintain blood pressure and fluid volume within ordered parameters to optimize cerebral perfusion and minimize risk of hemorrhage  - Monitor temperature, glucose, and sodium. Initiate appropriate interventions as ordered  Outcome:  Progressing  Goal: Absence of seizures  Description: INTERVENTIONS  - Monitor for seizure activity  - Administer anti-seizure medications as ordered  - Monitor neurological status  Outcome: Progressing  Goal: Remains free of injury related to seizure activity  Description: INTERVENTIONS:  - Maintain airway, patient safety  and administer oxygen as ordered  - Monitor patient for seizure activity, document and report duration and description of seizure to MD/LIP  - If seizure occurs, turn patient to side and suction secretions as needed  - Reorient patient post seizure  - Seizure pads on all 4 side rails  - Instruct patient/family to notify RN of any seizure activity  - Instruct patient/family to call for assistance with activity based on assessment  Outcome: Progressing  Goal: Achieves maximal functionality and self care  Description: INTERVENTIONS  - Monitor swallowing and airway patency with patient fatigue and changes in neurological status  - Encourage and assist patient to increase activity and self care with guidance from PT/OT  - Encourage visually impaired, hearing impaired and aphasic patients to use assistive/communication devices  Outcome: Progressing     Problem: Impaired Functional Mobility  Goal: Achieve highest/safest level of mobility/gait  Description: Interventions:  - Assess patient's functional ability and stability  - Promote increasing activity/tolerance for mobility and gait  - Educate and engage patient/family in tolerated activity level and precautions  - Recommend use of  RW for transfers and ambulation  Outcome: Progressing     Problem: Impaired Cognition  Goal: Patient will exhibit improved attention, thought processing and/or memory  Description: Interventions:    Outcome: Progressing     Problem: Diabetes/Glucose Control  Goal: Glucose maintained within prescribed range  Description: INTERVENTIONS:  - Monitor Blood Glucose as ordered  - Assess for signs and symptoms of hyperglycemia and  hypoglycemia  - Administer ordered medications to maintain glucose within target range  - Assess barriers to adequate nutritional intake and initiate nutrition consult as needed  - Instruct patient on self management of diabetes  Outcome: Progressing

## 2025-06-20 NOTE — PROGRESS NOTES
[facility]  Progress Note    Josep Frost Patient Status:  Inpatient    1965 MRN [id number]   Location [facility] 2NE-A Attending Glendy Hayden MD   Hosp Day # 7 PCP NEELIMA JEFFERSON     Subjective:  Josep Frost is a(n) 60 year old male remains afeb   Continues to feel improved  Walked in the halls without O2 needs today  Some hypoxia when going from sitting to standing  Wore his machine 5 hours AHI 0.6      Objective:  /72 (BP Location: Left arm)   Pulse 72   Temp 98.1 °F (36.7 °C) (Oral)   Resp 17   Ht 6' 3\" (1.905 m)   Wt (!) 383 lb 9.6 oz (174 kg)   SpO2 93%   BMI 47.95 kg/m² currently on room air      Temp (24hrs), Av.8 °F (36.6 °C), Min:97.5 °F (36.4 °C), Max:98.1 °F (36.7 °C)      Intake/Output:    Intake/Output Summary (Last 24 hours) at 2025 1043  Last data filed at 2025 0746  Gross per 24 hour   Intake 1202 ml   Output 2100 ml   Net -898 ml       Physical Exam:   General: alert, cooperative, oriented.  No respiratory distress.   Head: Normocephalic, without obvious abnormality, atraumatic.   Throat: Lips, mucosa, and tongue normal.  No thrush noted.   Neck: trachea midline, no adenopathy, no thyromegaly. No JVD.   Lungs: Remains with diminished excursion no auscultated wheeze   Chest wall: No tenderness or deformity.   Heart: Currently appears regular sinus occasional ectopy   Abdomen: soft, non-distended, no masses, no guarding, no     Rebound.  Remains protuberant   Extremity: Remains with significant bilateral edema   Skin: No rashes or lesions.   Neurological: Alert, interactive, no focal deficits    Lab Data Review:  Recent Labs     25  0823   WBC 9.3   HGB 12.1*   .0     Recent Labs     25  0444 25  1557 25  0638 25  1454 25  1602 25  0419     145 144 143  143  --  140 141  141   K 3.4*  3.4*  3.4* 3.8 3.2*  3.2*  3.2* 3.7 3.8 3.5  3.5  3.5     100 98 98  98  --  97* 98  98    CO2 36.0*  36.0* 36.0* 35.0*  35.0*  --  33.0* 33.0*  33.0*   BUN 27*  28* 27* 28*  28*  --  26* 32*  32*   CREATSERUM 1.23  1.23 1.28 1.21  1.21  --  1.37* 1.44*  1.44*     Recent Labs   Lab 06/14/25  0419 06/15/25  0159 06/15/25  0840 06/16/25  0408 06/16/25  1153 06/16/25  1847 06/17/25  0111   PTP 15.6* 16.1*  --  14.2  --   --   --    INR 1.23* 1.27*  --  1.09  --   --   --    PTT 24.0  --    < > 36.2* 32.6 34.6 41.6*    < > = values in this interval not displayed.     Recent Labs   Lab 06/19/25  0753   ABGPHT 7.47*   FWZATS2I 58*   FTNZB5B 79*   ABGHCO3 37.2*   ABGBE 15.8*   TEMP 98.6   DUNG Positive   SITE Left Radial   DEV Nasal cannula   THGB 12.7*         Cultures: remains neg     Radiology:  No results found.  Reviewed      Medications reviewed     Assessment and Plan:   Problem List[1]    Assessment:  Acute hypoxic/hypercapnic respiratory failure/mechanical ventilation-now extubated to room air  Status post PEA arrest --suspected anaphylaxis to Definity --completed targeted temperature protocol now awake and alert and hemodynamically stable   acute on chronic hypercapnic respiratory failure-bicarb in February elevated-compatible with EL/OHS-reports compliance at home with initial study 2010-repeat with AHI 38 point--remains with mild hypercapnia-download today on CWP 15 with AHI of 0.8-uses [employer]--continuing to wean FiO2  Atrial fibrillation with RVR history of ablation: Rate controlled-sinus at times  Non-STEMI type II demand ischemia  Severe obesity  ROSEMARY-some waxing and waning  Pulmonary infiltrate possible aspiration pneumonia plans to complete course- x-ray improving  Increased  LFTs- to follow            Plan:  Wean FiO2 to keep oxygen saturation between 90% 92%  Completed Unasyn   Continues with volume load anticipate need for diuresis-following kidney function  Resume nocturnal CPAP follow-up for need to accelerate-to get wsyhkups-ryewaayxt-yrwzze titration study once recovered  for possible role of BiPAP-recheck ABG  Reviewed with patient and family at bedside      CC     Rabia Pierre MD  6/20/2025  10:43 AM         [1]   Patient Active Problem List  Diagnosis    Cardiac arrest (HCC)    NSTEMI (non-ST elevated myocardial infarction) (HCC)    Atrial fibrillation (HCC)    Morbid obesity (HCC)    ROSEMARY (acute kidney injury)    Acute respiratory failure with hypoxia (HCC)    EL (obstructive sleep apnea)    Acute on chronic respiratory failure with hypercapnia (HCC)

## 2025-06-21 LAB
ALBUMIN SERPL-MCNC: 4.1 G/DL (ref 3.2–4.8)
ALBUMIN/GLOB SERPL: 1.7 {RATIO} (ref 1–2)
ALP LIVER SERPL-CCNC: 79 U/L (ref 45–117)
ALT SERPL-CCNC: 47 U/L (ref 10–49)
ANION GAP SERPL CALC-SCNC: 7 MMOL/L (ref 0–18)
ANION GAP SERPL CALC-SCNC: 7 MMOL/L (ref 0–18)
ANION GAP SERPL CALC-SCNC: 8 MMOL/L (ref 0–18)
AST SERPL-CCNC: 56 U/L (ref ?–34)
BILIRUB SERPL-MCNC: 0.7 MG/DL (ref 0.2–1.1)
BUN BLD-MCNC: 36 MG/DL (ref 9–23)
CALCIUM BLD-MCNC: 9.1 MG/DL (ref 8.7–10.6)
CHLORIDE SERPL-SCNC: 101 MMOL/L (ref 98–112)
CHLORIDE SERPL-SCNC: 101 MMOL/L (ref 98–112)
CHLORIDE SERPL-SCNC: 102 MMOL/L (ref 98–112)
CO2 SERPL-SCNC: 31 MMOL/L (ref 21–32)
CO2 SERPL-SCNC: 33 MMOL/L (ref 21–32)
CO2 SERPL-SCNC: 33 MMOL/L (ref 21–32)
CREAT BLD-MCNC: 1.33 MG/DL (ref 0.7–1.3)
CREAT BLD-MCNC: 1.51 MG/DL (ref 0.7–1.3)
CREAT BLD-MCNC: 1.51 MG/DL (ref 0.7–1.3)
EGFRCR SERPLBLD CKD-EPI 2021: 53 ML/MIN/1.73M2 (ref 60–?)
EGFRCR SERPLBLD CKD-EPI 2021: 53 ML/MIN/1.73M2 (ref 60–?)
EGFRCR SERPLBLD CKD-EPI 2021: 61 ML/MIN/1.73M2 (ref 60–?)
GLOBULIN PLAS-MCNC: 2.4 G/DL (ref 2–3.5)
GLUCOSE BLD-MCNC: 102 MG/DL (ref 70–99)
GLUCOSE BLD-MCNC: 105 MG/DL (ref 70–99)
GLUCOSE BLD-MCNC: 114 MG/DL (ref 70–99)
GLUCOSE BLD-MCNC: 114 MG/DL (ref 70–99)
GLUCOSE BLD-MCNC: 119 MG/DL (ref 70–99)
GLUCOSE BLD-MCNC: 171 MG/DL (ref 70–99)
GLUCOSE BLD-MCNC: 186 MG/DL (ref 70–99)
GLUCOSE BLD-MCNC: 97 MG/DL (ref 70–99)
MAGNESIUM SERPL-MCNC: 2.2 MG/DL (ref 1.6–2.6)
MAGNESIUM SERPL-MCNC: 2.2 MG/DL (ref 1.6–2.6)
OSMOLALITY SERPL CALC.SUM OF ELEC: 301 MOSM/KG (ref 275–295)
POTASSIUM SERPL-SCNC: 4 MMOL/L (ref 3.5–5.1)
POTASSIUM SERPL-SCNC: 4.3 MMOL/L (ref 3.5–5.1)
PROT SERPL-MCNC: 6.5 G/DL (ref 5.7–8.2)
SODIUM SERPL-SCNC: 141 MMOL/L (ref 136–145)

## 2025-06-21 PROCEDURE — 99232 SBSQ HOSP IP/OBS MODERATE 35: CPT | Performed by: INTERNAL MEDICINE

## 2025-06-21 PROCEDURE — 99232 SBSQ HOSP IP/OBS MODERATE 35: CPT | Performed by: HOSPITALIST

## 2025-06-21 RX ORDER — FUROSEMIDE 10 MG/ML
60 INJECTION INTRAMUSCULAR; INTRAVENOUS
Status: DISCONTINUED | OUTPATIENT
Start: 2025-06-21 | End: 2025-06-22

## 2025-06-21 NOTE — PROGRESS NOTES
06/20/25 2220   BiPAP   $ RT Standby Charge (per 15 min) 1   $ EL Follow up charge Yes   Device Dream Station   BiPAP / CPAP CE# c9   BiPAP bacteria filter Yes   BiPAP Pre-use check ok? Yes   BIPAP plugged into main power? Yes   Mode CPAP   Interface Full face mask   Mask Size Large   Control Settings   Set CPAP 15   Oxygen Percent 21 %   BiPAP/CPAP Monitored Parameters   H2O Bag Level Filled   EL Protocol Yes   Toleration Well     Patient will place himself on

## 2025-06-21 NOTE — PLAN OF CARE
Patient alert and oriented x4, on room air/CPAP -- sleep study tonight without additional 2L to assess for home O2 needs (lowest O2 sat overnight 89% without additional O2). Sinus rhythm with a BBB and occasional PAC's on tele. Patient ambulates with stand by assist and walker.  Severe midsternal pain reported when coughing -- scheduled Tylenol and PRN IV toradol given. benzonatate given for cough. Skin dry and intact. Lasix held today for creatinine 1.44. patient sleeping in chair and call light within reach. Reviewed plan of care and patient verbalizes understanding.     Problem: CARDIOVASCULAR - ADULT  Goal: Maintains optimal cardiac output and hemodynamic stability  Description: INTERVENTIONS:  - Monitor vital signs, rhythm, and trends  - Monitor for bleeding, hypotension and signs of decreased cardiac output  - Evaluate effectiveness of vasoactive medications to optimize hemodynamic stability  - Monitor arterial and/or venous puncture sites for bleeding and/or hematoma  - Assess quality of pulses, skin color and temperature  - Assess for signs of decreased coronary artery perfusion - ex. Angina  - Evaluate fluid balance, assess for edema, trend weights  Outcome: Progressing  Goal: Absence of cardiac arrhythmias or at baseline  Description: INTERVENTIONS:  - Continuous cardiac monitoring, monitor vital signs, obtain 12 lead EKG if indicated  - Evaluate effectiveness of antiarrhythmic and heart rate control medications as ordered  - Initiate emergency measures for life threatening arrhythmias  - Monitor electrolytes and administer replacement therapy as ordered  Outcome: Progressing

## 2025-06-21 NOTE — PROGRESS NOTES
[facility]   part of [facility]     Hospitalist Progress Note     Josep Frost Patient Status:  Inpatient    1965 MRN [id number]   Location [facility] 4SW-A Attending Jumana Doe MD   Hosp Day # 8 PCP NEELIMA JEFFERSON     Chief Complaint: cardiac arrest    Subjective:     Patient is sitting up in the chair  No complaints    Objective:    Review of Systems:   Negative  Wt 435 from 405    Vital signs:  Temp:  [98 °F (36.7 °C)-98.3 °F (36.8 °C)] 98.1 °F (36.7 °C)  Pulse:  [65-76] 69  Resp:  [16-18] 18  BP: (120-149)/(68-78) 120/73  SpO2:  [90 %-98 %] 91 %    Physical Exam:    General: stable  Respiratory: No wheezes, no rhonchi  Cardiovascular: S1, S2, regular rate and rhythm  Abdomen: Soft, Non-tender, non-distended, positive bowel sounds  Neuro: No new focal deficits.   Extremities: No edema      Diagnostic Data:    Labs:  Recent Labs   Lab 06/15/25  0159 06/15/25  0958 25  0408 25  0639 25  0823   WBC 21.9* 13.5* 10.7 11.2* 9.3   HGB 13.7 12.5* 11.2* 11.1* 12.1*   MCV 88.6 88.0 88.7 89.2 91.9   .0 172.0 163.0 205.0 213.0   INR 1.27*  --  1.09  --   --        Recent Labs   Lab 25  0638 25  1454 25  0419 25  1356 25  1631 25  0423   *  108*   < > 122*  122*  --  126* 114*  114*   BUN 28*  28*   < > 32*  32*  --  36* 36*  36*   CREATSERUM 1.21  1.21   < > 1.44*  1.44*  --  1.44* 1.51*  1.51*   CA 9.4  9.4   < > 9.6  9.6  --  9.2 9.1  9.1   ALB 4.4  --  4.6  --   --  4.1     143   < > 141  141  --  141 141  141   K 3.2*  3.2*  3.2*   < > 3.5  3.5  3.5 4.5 3.7 4.3  4.3  4.3   CL 98  98   < > 98  98  --  101 101  101   CO2 35.0*  35.0*   < > 33.0*  33.0*  --  31.0 33.0*  33.0*   ALKPHO 81  --  88  --   --  79   AST 48*  --  56*  --   --  56*   ALT 38  --  51*  --   --  47   BILT 0.7  --  0.8  --   --  0.7   TP 6.8  --  7.1  --   --  6.5    < > = values in this interval not displayed.        Estimated Glomerular Filtration Rate: 53 mL/min/1.73m2 (A) (result from lab).    No results for input(s): \"TROP\", \"TROPHS\", \"CK\" in the last 168 hours.      Recent Labs   Lab 06/15/25  0159 06/16/25  0408   PTP 16.1* 14.2   INR 1.27* 1.09                    Microbiology    Hospital Encounter on 06/13/25   1. Blood Culture     Status: None    Collection Time: 06/14/25  2:59 PM    Specimen: Blood,peripheral   Result Value Ref Range    Blood Culture Result No Growth 5 Days N/A         Imaging: Reviewed in Epic.    Medications: Scheduled Medications[1]    Assessment & Plan:      # PEA arrest    - thought 2/2 definity contrast reaction    - EKG on admission with Afib with RBBB (seen on prior)   - TTE with limited views, though preserved EF, no tamponade physiology or significant valvular abnormalities     - CTA negative for PE   - CTH unremarkable   - Empiric IV abx completed  -wbc better  -VSS  -wean steroids        # Afib  and A Flutter  -s/p A flutter ablation 4/10      - s/p  cardioversion od afib 5/13/2025   -plan to restart Flecainide in 1 mo for p Afib     # NSTEMI, likely type II   - continue trending trops    - cardiology following     # Morbid obesity, BMI 50  Needs to lose 150 lbs, referral to wt loss clinic    #PreDM, A1c 6.0    # Leukocytosis  Improved         Supplementary Documentation:     Quality:  DVT Mechanical Prophylaxis:   SCDs, Early ambuation  DVT Pharmacologic Prophylaxis   Medication    rivaroxaban (Xarelto) tab 20 mg                Code Status: Full Code  [place]: No urinary catheter in place  [place] Duration (in days): 2  Central line:    DERIC:     Discharge is dependent on: progress  At this point Mr. Frost is expected to be discharge to: tbd    The 21st Century Cures Act makes medical notes like these available to patients in the interest of transparency. Please be advised this is a medical document. Medical documents are intended to carry relevant information, facts as evident, and the  clinical opinion of the practitioner. The medical note is intended as peer to peer communication and may appear blunt or direct. It is written in medical language and may contain abbreviations or verbiage that are unfamiliar.                         [1]    acetaminophen  500 mg Oral TID    metoprolol succinate ER  50 mg Oral BID    rivaroxaban  20 mg Oral Daily with food    multivitamin  1 tablet Oral Daily    furosemide  60 mg Intravenous BID (Diuretic)    insulin aspart  2-10 Units Subcutaneous TID CC and HS

## 2025-06-21 NOTE — PROGRESS NOTES
Progress Note  Josep Willy Frost Patient Status:  Inpatient    1965 MRN [id number]   Location [facility] 2NE-A Attending Glendy Hayden MD   Hosp Day # 8 PCP NEELIMA JEFFERSON     Subjective:  No acute events overnight.  Reports mild shortness of breath and legs feeling more tight today with held diuretics, denies any chest pain, palpitations or dizziness at this time.    Objective:  /73 (BP Location: Right arm)   Pulse 69   Temp 98.1 °F (36.7 °C) (Oral)   Resp 18   Ht 6' 3\" (1.905 m)   Wt (!) 384 lb 4.2 oz (174.3 kg)   SpO2 91%   BMI 48.03 kg/m²     Telemetry: SR, HR 60s, PACs      Intake/Output:    Intake/Output Summary (Last 24 hours) at 2025 0731  Last data filed at 2025 1710  Gross per 24 hour   Intake 260 ml   Output 500 ml   Net -240 ml       Last 3 Weights   25 0540 (!) 384 lb 4.2 oz (174.3 kg)   25 0333 (!) 383 lb 9.6 oz (174 kg)   25 0813 (!) 382 lb 0.9 oz (173.3 kg)   25 0522 (!) 380 lb 6.4 oz (172.5 kg)   25 0000 (!) 414 lb 7.4 oz (188 kg)   25 0100 (!) 435 lb 3 oz (197.4 kg)   25 0400 (!) 411 lb 9.6 oz (186.7 kg)   06/15/25 0400 (!) 405 lb 10.3 oz (184 kg)   25 2045 (!) 403 lb 7.1 oz (183 kg)   25 1512 (!) 380 lb (172.4 kg)   25 1207 (!) 380 lb (172.4 kg)       Labs:  Recent Labs   Lab 25  0419 25  1356 25  1631 25  0423   *  122*  --  126* 114*  114*   BUN 32*  32*  --  36* 36*  36*   CREATSERUM 1.44*  1.44*  --  1.44* 1.51*  1.51*   EGFRCR 56*  56*  --  56* 53*  53*   CA 9.6  9.6  --  9.2 9.1  9.1     141  --  141 141  141   K 3.5  3.5  3.5 4.5 3.7 4.3  4.3  4.3   CL 98  98  --  101 101  101   CO2 33.0*  33.0*  --  31.0 33.0*  33.0*     Recent Labs   Lab 25  0408 25  0639 25  0823   RBC 3.71* 3.60* 3.96*   HGB 11.2* 11.1* 12.1*   HCT 32.9* 32.1* 36.4*   MCV 88.7 89.2 91.9   MCH 30.2 30.8 30.6   MCHC 34.0 34.6 33.2   RDW 13.6 13.6 13.3    NEPRELIM 8.95* 8.87* 7.02   WBC 10.7 11.2* 9.3   .0 205.0 213.0         No results for input(s): \"TROP\", \"TROPHS\", \"CK\" in the last 168 hours.    Review of Systems   Constitutional: Negative.   Cardiovascular:  Positive for dyspnea on exertion and leg swelling.   Respiratory: Negative.         Physical Exam:    Gen: alert, oriented x 3, NAD  Heent: pupils equal, reactive. Mucous membranes moist.   Neck: no jvd  Cardiac: regular rate and rhythm, normal S1,S2, no murmur, gallop or rub   Lungs: fine bibasilar crackles   Abd: soft, NT/ND +bs  Ext: +1-2 lower extremities edema  Skin: Warm, dry  Neuro: No focal deficits      Medications:    Scheduled Medications[1]  Medication Infusions[2]    Assessment:  Cardiopulmonary Arrest, PEA then Asystole    ? Anaphylactic Reaction - Hypoxia, arrest s/p Definity w/echo  Required multiple rounds of CPR  Acute Hypoxic/Hypercapneic Respiratory Failure, Poss Aspiration PNA  Now extubated - decreased O2 requirements  On antibx  CPAP at [facility][1] following  Elevated Troponin, Type II  Trop 105 > 1120 > 786 - likely supply/demand d/t above  Pt reports prior stress test w/normal perfusion 2021  Volume Overload, Acute on Chronic HFpEF   S/P IV lasix, negative net neg 4L  Echo w/LVEF 55-60%, no WMA, normal LA size reported  Paroxysmal Atrial Fibrillation/Flutter  Hx Prior AFib/flutter Ablation 4/2025, DCCV 5/2025  Initially w/brief PAF episodes and s/p IV amiodarone - now off  Hx on flecainide prior to admit, will keep off due to recent IV Amio, can restart outpatient in 1 month  Maintaining NSR - on BB along for now   TSH WNL  [id number] 1 (HTN) - on xarelto 20mg daily  ROSEMARY - Cr trending up, will hold hold off further diuretics at this time     Morbid Obesity, BMI 47.9  EL - CPAP    Plan:  Will resume IV lasix with worsening leg edema and shortness of breath, will recheck BMP this afternoon with worsening renal function.  Strict I/Os and daily weights.  Monitor electrolytes and  renal function.  Continue bb, plan to resume on flecainide in 1 month per EP.  Continue xarelto for stroke prevention.  No plans for ischemic eval during this admission.    Plan of care discussed with patient, RN.    Miroslava Billings, OLIMPIA  6/21/2025  7:31 AM  [phone]      Patient seen and examined independently.  Note reviewed and labs reviewed.  Agree with above assessment and plan.  IV diuresis  Continue oral BB  Resume flecainide in 1 month per EP recommendations  Xarelto for stroke prevention       [1]    acetaminophen  500 mg Oral TID    metoprolol succinate ER  50 mg Oral BID    rivaroxaban  20 mg Oral Daily with food    multivitamin  1 tablet Oral Daily    furosemide  60 mg Intravenous BID (Diuretic)    insulin aspart  2-10 Units Subcutaneous TID CC and HS   [2]    dextrose 10%

## 2025-06-21 NOTE — PLAN OF CARE
Pt a/o x4. C/o CP with coughing. Improved with PRN tessalon pearls and PRN Robitussin. Tolerating RA. NSR observed to bedside monitor. VSS. Up to bathroom ad raza. Increased urine output with lasix administration. Good appetite. FR 2L. Pt family at beside throughout the day. Plan of care discussed.

## 2025-06-21 NOTE — PROGRESS NOTES
[facility]  Progress Note    Josep Frost Patient Status:  Inpatient    1965 MRN [id number]   Location [facility] 2NE-A Attending Glendy Hayden MD   Hosp Day # 8 PCP NEELIMA JEFFERSON     Subjective:  Josep Frost is a(n) 60 year old male remains afeb    Now on Room air   Continues to feel better daily up walking in the halls  Slept well with CPAP last night    Objective:  /80 (BP Location: Left arm)   Pulse 65   Temp 97.7 °F (36.5 °C) (Oral)   Resp 18   Ht 6' 3\" (1.905 m)   Wt (!) 384 lb 4.2 oz (174.3 kg)   SpO2 93%   BMI 48.03 kg/m² currently on room air      Temp (24hrs), Av.1 °F (36.7 °C), Min:97.7 °F (36.5 °C), Max:98.3 °F (36.8 °C)      Intake/Output:    Intake/Output Summary (Last 24 hours) at 2025 1245  Last data filed at 2025 1156  Gross per 24 hour   Intake 360 ml   Output 500 ml   Net -140 ml       Physical Exam:   General: alert, cooperative, oriented.  No respiratory distress.   Head: Normocephalic, without obvious abnormality, atraumatic.   Throat: Lips, mucosa, and tongue normal.  No thrush noted.   Neck: trachea midline, no adenopathy, no thyromegaly. No JVD.   Lungs: Much better excursion slight basilar crackles clear some with deep inspiration no auscultated wheeze regular rate and rhythm appears sinus   Chest wall: No tenderness or deformity.   Heart: rrr   Abdomen: soft, non-distended, no masses, no guarding, no     Rebound.  Protuberant no diarrhea   Extremity: Ongoing edema bilaterally   Skin: No rashes or lesions.   Neurological: Alert, interactive, no focal deficits    Lab Data Review:  No results for input(s): \"WBC\", \"HGB\", \"PLT\" in the last 72 hours.  Recent Labs     25  0638 25  1454 25  1602 25  0419 25  1356 25  1631 25  0423     143  --  140 141  141  --  141 141  141   K 3.2*  3.2*  3.2*   < > 3.8 3.5  3.5  3.5 4.5 3.7 4.3  4.3  4.3   CL 98  98  --  97* 98  98  --  101 101   101   CO2 35.0*  35.0*  --  33.0* 33.0*  33.0*  --  31.0 33.0*  33.0*   BUN 28*  28*  --  26* 32*  32*  --  36* 36*  36*   CREATSERUM 1.21  1.21  --  1.37* 1.44*  1.44*  --  1.44* 1.51*  1.51*    < > = values in this interval not displayed.     Recent Labs   Lab 06/15/25  0159 06/15/25  0840 06/16/25  0408 06/16/25  1153 06/16/25  1847 06/17/25  0111   PTP 16.1*  --  14.2  --   --   --    INR 1.27*  --  1.09  --   --   --    PTT  --    < > 36.2* 32.6 34.6 41.6*    < > = values in this interval not displayed.       Cultures: Blood cultures remain negative    Radiology:  No results found.  Reviewed    Medications reviewed     Assessment and Plan:   Problem List[1]    Assessment:  Acute hypoxic/hypercapnic respiratory failure/mechanical ventilation-now extubated to room air  Status post PEA arrest --suspected anaphylaxis to Definity --completed targeted temperature protocol now awake and alert and hemodynamically stable   acute on chronic hypercapnic respiratory failure-bicarb in February elevated-compatible with EL/OHS-reports compliance at home with initial study 2010-repeat with AHI 38 point--remains with mild hypercapnia-download today on CWP 15 with AHI of 0.8-uses [employer]--continuing to wean FiO2  Paroxysmal atrial fibrillation with RVR history of ablation: Rate controlled-sinus at times  Chronic HFpEF with ongoing diuresis  Non-STEMI type II demand ischemia  ROSEMARY-some waxing and waning  Pulmonary infiltrate possible aspiration pneumonia completed course  Increased  LFTs- to follow            Plan:  Wean FiO2 to keep oxygen saturation between 90% 92%  Completed Unasyn   Continues with volume load anticipate need for diuresis-following kidney function  Resume nocturnal CPAP follow-up for need to accelerate-to get [id number] titration study once recovered for possible role of BiPAP-checking O2 sats on room air--unable to get oxygen with CPAP at discharge without sleep study  Reviewed  with patient and family at bedside        CC     Rabia Pierre MD  6/21/2025  12:45 PM         [1]   Patient Active Problem List  Diagnosis    Cardiac arrest (HCC)    NSTEMI (non-ST elevated myocardial infarction) (HCC)    Atrial fibrillation (HCC)    Morbid obesity (HCC)    ROSEMARY (acute kidney injury)    Acute respiratory failure with hypoxia (HCC)    EL (obstructive sleep apnea)    Acute on chronic respiratory failure with hypercapnia (HCC)

## 2025-06-22 VITALS
TEMPERATURE: 98 F | RESPIRATION RATE: 20 BRPM | HEIGHT: 75 IN | OXYGEN SATURATION: 94 % | WEIGHT: 315 LBS | SYSTOLIC BLOOD PRESSURE: 141 MMHG | HEART RATE: 76 BPM | BODY MASS INDEX: 39.17 KG/M2 | DIASTOLIC BLOOD PRESSURE: 51 MMHG

## 2025-06-22 LAB
ALBUMIN SERPL-MCNC: 4.6 G/DL (ref 3.2–4.8)
ALBUMIN/GLOB SERPL: 1.8 {RATIO} (ref 1–2)
ALP LIVER SERPL-CCNC: 90 U/L (ref 45–117)
ALT SERPL-CCNC: 52 U/L (ref 10–49)
ANION GAP SERPL CALC-SCNC: 8 MMOL/L (ref 0–18)
ANION GAP SERPL CALC-SCNC: 8 MMOL/L (ref 0–18)
AST SERPL-CCNC: 45 U/L (ref ?–34)
BILIRUB SERPL-MCNC: 0.8 MG/DL (ref 0.2–1.1)
BUN BLD-MCNC: 33 MG/DL (ref 9–23)
BUN BLD-MCNC: 33 MG/DL (ref 9–23)
CALCIUM BLD-MCNC: 9.4 MG/DL (ref 8.7–10.6)
CALCIUM BLD-MCNC: 9.4 MG/DL (ref 8.7–10.6)
CHLORIDE SERPL-SCNC: 100 MMOL/L (ref 98–112)
CHLORIDE SERPL-SCNC: 100 MMOL/L (ref 98–112)
CO2 SERPL-SCNC: 32 MMOL/L (ref 21–32)
CO2 SERPL-SCNC: 32 MMOL/L (ref 21–32)
CREAT BLD-MCNC: 1.32 MG/DL (ref 0.7–1.3)
CREAT BLD-MCNC: 1.32 MG/DL (ref 0.7–1.3)
EGFRCR SERPLBLD CKD-EPI 2021: 62 ML/MIN/1.73M2 (ref 60–?)
EGFRCR SERPLBLD CKD-EPI 2021: 62 ML/MIN/1.73M2 (ref 60–?)
GLOBULIN PLAS-MCNC: 2.5 G/DL (ref 2–3.5)
GLUCOSE BLD-MCNC: 110 MG/DL (ref 70–99)
GLUCOSE BLD-MCNC: 110 MG/DL (ref 70–99)
GLUCOSE BLD-MCNC: 115 MG/DL (ref 70–99)
GLUCOSE BLD-MCNC: 97 MG/DL (ref 70–99)
MAGNESIUM SERPL-MCNC: 2.3 MG/DL (ref 1.6–2.6)
OSMOLALITY SERPL CALC.SUM OF ELEC: 298 MOSM/KG (ref 275–295)
OSMOLALITY SERPL CALC.SUM OF ELEC: 298 MOSM/KG (ref 275–295)
POTASSIUM SERPL-SCNC: 3.9 MMOL/L (ref 3.5–5.1)
POTASSIUM SERPL-SCNC: 3.9 MMOL/L (ref 3.5–5.1)
PROT SERPL-MCNC: 7.1 G/DL (ref 5.7–8.2)
SODIUM SERPL-SCNC: 140 MMOL/L (ref 136–145)
SODIUM SERPL-SCNC: 140 MMOL/L (ref 136–145)

## 2025-06-22 PROCEDURE — 99239 HOSP IP/OBS DSCHRG MGMT >30: CPT | Performed by: HOSPITALIST

## 2025-06-22 PROCEDURE — 99232 SBSQ HOSP IP/OBS MODERATE 35: CPT | Performed by: INTERNAL MEDICINE

## 2025-06-22 RX ORDER — FUROSEMIDE 20 MG/1
60 TABLET ORAL DAILY
Qty: 90 TABLET | Refills: 1 | Status: SHIPPED | OUTPATIENT
Start: 2025-06-23

## 2025-06-22 NOTE — DISCHARGE PLANNING
Patient cleared for discharge by MD's. Discharge paperwork gone over with patient, all questions answered. IV removed, tele monitor removed. Discharged off unit via wheelchair.

## 2025-06-22 NOTE — PLAN OF CARE
Assumed care of patient at change of shift.   Alert and oriented x4.   Sinus rhythm with BBB and occasional PAC's on tele.   Room air, CPAP overnight. Lung sounds diminished bilaterally, weak, painful, productive cough noted. Denies shortness of breath and dizziness.   Complaints of sternal pain with coughing and sneezing. Scheduled pain meds given.   Continent of bowel and bladder.   Safety precautions maintained.    Discussed plan of care with patient. All questions answered.    POC: IV Lasix x1; home today if ambulating well    Problem: CARDIOVASCULAR - ADULT  Goal: Maintains optimal cardiac output and hemodynamic stability  Description: INTERVENTIONS:  - Monitor vital signs, rhythm, and trends  - Monitor for bleeding, hypotension and signs of decreased cardiac output  - Evaluate effectiveness of vasoactive medications to optimize hemodynamic stability  - Monitor arterial and/or venous puncture sites for bleeding and/or hematoma  - Assess quality of pulses, skin color and temperature  - Assess for signs of decreased coronary artery perfusion - ex. Angina  - Evaluate fluid balance, assess for edema, trend weights  Outcome: Progressing  Goal: Absence of cardiac arrhythmias or at baseline  Description: INTERVENTIONS:  - Continuous cardiac monitoring, monitor vital signs, obtain 12 lead EKG if indicated  - Evaluate effectiveness of antiarrhythmic and heart rate control medications as ordered  - Initiate emergency measures for life threatening arrhythmias  - Monitor electrolytes and administer replacement therapy as ordered  Outcome: Progressing     Problem: RESPIRATORY - ADULT  Goal: Achieves optimal ventilation and oxygenation  Description: INTERVENTIONS:  - Assess for changes in respiratory status  - Assess for changes in mentation and behavior  - Position to facilitate oxygenation and minimize respiratory effort  - Oxygen supplementation based on oxygen saturation or ABGs  - Provide Smoking Cessation handout, if  applicable  - Encourage broncho-pulmonary hygiene including cough, deep breathe, Incentive Spirometry  - Assess the need for suctioning and perform as needed  - Assess and instruct to report SOB or any respiratory difficulty  - Respiratory Therapy support as indicated  - Manage/alleviate anxiety  - Monitor for signs/symptoms of CO2 retention  Outcome: Progressing     Problem: GASTROINTESTINAL - ADULT  Goal: Minimal or absence of nausea and vomiting  Description: INTERVENTIONS:  - Maintain adequate hydration with IV or PO as ordered and tolerated  - Nasogastric tube to low intermittent suction as ordered  - Evaluate effectiveness of ordered antiemetic medications  - Provide nonpharmacologic comfort measures as appropriate  - Advance diet as tolerated, if ordered  - Obtain nutritional consult as needed  - Evaluate fluid balance  Outcome: Progressing  Goal: Maintains or returns to baseline bowel function  Description: INTERVENTIONS:  - Assess bowel function  - Maintain adequate hydration with IV or PO as ordered and tolerated  - Evaluate effectiveness of GI medications  - Encourage mobilization and activity  - Obtain nutritional consult as needed  - Establish a toileting routine/schedule  - Consider collaborating with pharmacy to review patient's medication profile  Outcome: Progressing     Problem: METABOLIC/FLUID AND ELECTROLYTES - ADULT  Goal: Glucose maintained within prescribed range  Description: INTERVENTIONS:  - Monitor Blood Glucose as ordered  - Assess for signs and symptoms of hyperglycemia and hypoglycemia  - Administer ordered medications to maintain glucose within target range  - Assess barriers to adequate nutritional intake and initiate nutrition consult as needed  - Instruct patient on self management of diabetes  Outcome: Progressing  Goal: Electrolytes maintained within normal limits  Description: INTERVENTIONS:  - Monitor labs and rhythm and assess patient for signs and symptoms of electrolyte  imbalances  - Administer electrolyte replacement as ordered  - Monitor response to electrolyte replacements, including rhythm and repeat lab results as appropriate  - Fluid restriction as ordered  - Instruct patient on fluid and nutrition restrictions as appropriate  Outcome: Progressing  Goal: Hemodynamic stability and optimal renal function maintained  Description: INTERVENTIONS:  - Monitor labs and assess for signs and symptoms of volume excess or deficit  - Monitor intake, output and patient weight  - Monitor urine specific gravity, serum osmolarity and serum sodium as indicated or ordered  - Monitor response to interventions for patient's volume status, including labs, urine output, blood pressure (other measures as available)  - Encourage oral intake as appropriate  - Instruct patient on fluid and nutrition restrictions as appropriate  Outcome: Progressing     Problem: SKIN/TISSUE INTEGRITY - ADULT  Goal: Skin integrity remains intact  Description: INTERVENTIONS  - Assess and document risk factors for pressure ulcer development  - Assess and document skin integrity  - Monitor for areas of redness and/or skin breakdown  - Initiate interventions, skin care algorithm/standards of care as needed  Outcome: Progressing  Goal: Oral mucous membranes remain intact  Description: INTERVENTIONS  - Assess oral mucosa and hygiene practices  - Implement preventative oral hygiene regimen  - Implement oral medicated treatments as ordered  Outcome: Progressing     Problem: NEUROLOGICAL - ADULT  Goal: Achieves stable or improved neurological status  Description: INTERVENTIONS  - Assess for and report changes in neurological status  - Initiate measures to prevent increased intracranial pressure  - Maintain blood pressure and fluid volume within ordered parameters to optimize cerebral perfusion and minimize risk of hemorrhage  - Monitor temperature, glucose, and sodium. Initiate appropriate interventions as ordered  Outcome:  Progressing  Goal: Absence of seizures  Description: INTERVENTIONS  - Monitor for seizure activity  - Administer anti-seizure medications as ordered  - Monitor neurological status  Outcome: Progressing  Goal: Remains free of injury related to seizure activity  Description: INTERVENTIONS:  - Maintain airway, patient safety  and administer oxygen as ordered  - Monitor patient for seizure activity, document and report duration and description of seizure to MD/LIP  - If seizure occurs, turn patient to side and suction secretions as needed  - Reorient patient post seizure  - Seizure pads on all 4 side rails  - Instruct patient/family to notify RN of any seizure activity  - Instruct patient/family to call for assistance with activity based on assessment  Outcome: Progressing  Goal: Achieves maximal functionality and self care  Description: INTERVENTIONS  - Monitor swallowing and airway patency with patient fatigue and changes in neurological status  - Encourage and assist patient to increase activity and self care with guidance from PT/OT  - Encourage visually impaired, hearing impaired and aphasic patients to use assistive/communication devices  Outcome: Progressing     Problem: Impaired Functional Mobility  Goal: Achieve highest/safest level of mobility/gait  Description: Interventions:  - Assess patient's functional ability and stability  - Promote increasing activity/tolerance for mobility and gait  - Educate and engage patient/family in tolerated activity level and precautions  - Recommend use of  RW for transfers and ambulation  Outcome: Progressing     Problem: Diabetes/Glucose Control  Goal: Glucose maintained within prescribed range  Description: INTERVENTIONS:  - Monitor Blood Glucose as ordered  - Assess for signs and symptoms of hyperglycemia and hypoglycemia  - Administer ordered medications to maintain glucose within target range  - Assess barriers to adequate nutritional intake and initiate nutrition  consult as needed  - Instruct patient on self management of diabetes  Outcome: Progressing

## 2025-06-22 NOTE — PLAN OF CARE
Patient alert and oriented x4, forgetful, on room air, NSR with BBB on tele. Patient ambulates to bathroom ad raza. Severe midsternal pain with coughing, improved with PRN tessalon and Robitussin. Skin dry and intact.  Bed in low position and call light within reach. Reviewed plan of care and patient verbalizes understanding.     Problem: CARDIOVASCULAR - ADULT  Goal: Maintains optimal cardiac output and hemodynamic stability  Description: INTERVENTIONS:  - Monitor vital signs, rhythm, and trends  - Monitor for bleeding, hypotension and signs of decreased cardiac output  - Evaluate effectiveness of vasoactive medications to optimize hemodynamic stability  - Monitor arterial and/or venous puncture sites for bleeding and/or hematoma  - Assess quality of pulses, skin color and temperature  - Assess for signs of decreased coronary artery perfusion - ex. Angina  - Evaluate fluid balance, assess for edema, trend weights  Outcome: Progressing  Goal: Absence of cardiac arrhythmias or at baseline  Description: INTERVENTIONS:  - Continuous cardiac monitoring, monitor vital signs, obtain 12 lead EKG if indicated  - Evaluate effectiveness of antiarrhythmic and heart rate control medications as ordered  - Initiate emergency measures for life threatening arrhythmias  - Monitor electrolytes and administer replacement therapy as ordered  Outcome: Progressing

## 2025-06-22 NOTE — PROGRESS NOTES
[facility]  Progress Note    Josep Frost Patient Status:  Inpatient    1965 MRN [id number]   Location [facility] 2NE-A Attending Glendy Hayden MD   Hosp Day # 9 PCP NEELIMA JEFFERSON     Subjective:  Josep Frost is a(n) 60 year old male remains afebrile  Feels great up walking in the halls without oxygen needed and no shortness of breath rare cough    Chest pain is improved taking only Tylenol    Objective:  /51 (BP Location: Left arm)   Pulse 76   Temp 97.8 °F (36.6 °C) (Oral)   Resp 20   Ht 6' 3\" (1.905 m)   Wt (!) 373 lb 10.9 oz (169.5 kg)   SpO2 94%   BMI 46.71 kg/m² sats 95% room air at rest in the chair      Temp (24hrs), Av °F (36.7 °C), Min:97.6 °F (36.4 °C), Max:98.2 °F (36.8 °C)      Intake/Output:    Intake/Output Summary (Last 24 hours) at 2025 1433  Last data filed at 2025 1320  Gross per 24 hour   Intake 582 ml   Output 2400 ml   Net -1818 ml       Physical Exam:   General: alert, cooperative, oriented.  No respiratory distress.   Head: Normocephalic, without obvious abnormality, atraumatic.   Throat: Lips, mucosa, and tongue normal.  No thrush noted.   Neck: trachea midline, no adenopathy, no thyromegaly. No JVD.   Lungs: Diminished tones bibasilar slight crackles that clear with deep inspiration   Chest wall: No tenderness or deformity.   Heart: Regular rate rhythm   Abdomen: soft, non-distended, no masses, no guarding, no     Rebound.  Obese nontender   Extremity: Decreasing edema soft tender   Skin: No rashes or lesions.   Neurological: Alert, interactive, no focal deficits    Lab Data Review:  No results for input(s): \"WBC\", \"HGB\", \"PLT\" in the last 72 hours.  Recent Labs     25  0419 25  1356 25  1631 25  0423 25  1601 25  0426     141  --  141 141  141 141 140  140   K 3.5  3.5  3.5 4.5 3.7 4.3  4.3  4.3 4.0 3.9  3.9   CL 98  98  --  101 101  101 102 100  100   CO2 33.0*  33.0*  --  31.0  33.0*  33.0* 31.0 32.0  32.0   BUN 32*  32*  --  36* 36*  36* 36* 33*  33*   CREATSERUM 1.44*  1.44*  --  1.44* 1.51*  1.51* 1.33* 1.32*  1.32*     Recent Labs   Lab 06/16/25  0408 06/16/25  1153 06/16/25  1847 06/17/25  0111   PTP 14.2  --   --   --    INR 1.09  --   --   --    PTT 36.2* 32.6 34.6 41.6*       Cultures: Blood cultures remain negative, sputum normal respiratory carmen    Radiology:  No results found.  Reviewed      Medications reviewed     Assessment and Plan:   Problem List[1]    Assessment:  Acute hypoxic/hypercapnic respiratory failure/mechanical ventilation-now extubated to room air  Status post PEA arrest --suspected anaphylaxis to Definity --completed targeted temperature protocol now awake and alert and hemodynamically stable   acute on chronic hypercapnic respiratory failure-bicarb in February elevated-compatible with EL/OHS-reports compliance at home with initial study 2010-repeat with AHI 38 point--remains with mild hypercapnia-download today on CWP 15 with AHI of 0.8-uses [employer] [facility]-- no O2 needs  Paroxysmal atrial fibrillation with RVR history of ablation: Rate controlled-sinus at times  Chronic HFpEF with ongoing diuresis  Non-STEMI type II demand ischemia  ROSEMARY-some waxing and waning-improved  Pulmonary infiltrate possible aspiration pneumonia completed course  Increased  LFTs- to follow            Plan:  Okay to home from pulmonary standpoint  Remains compliant with CPAP--plan for CPAP retitration to assure-sats overnight on CPAP and room air maintained 89% or above  Reviewed with patient and family at bedside        CC     Rabia Pierre MD  6/22/2025  2:33 PM         [1]   Patient Active Problem List  Diagnosis    Cardiac arrest (HCC)    NSTEMI (non-ST elevated myocardial infarction) (HCC)    Atrial fibrillation (HCC)    Morbid obesity (HCC)    ROSEMARY (acute kidney injury)    Acute respiratory failure with hypoxia (HCC)    EL (obstructive sleep apnea)    Acute on chronic  respiratory failure with hypercapnia (HCC)

## 2025-06-22 NOTE — PROGRESS NOTES
Progress Note  Josep Frost Patient Status:  Inpatient    1965 MRN [id number]   Location [facility] 2NE-A Attending Glendy Hayden MD   Hosp Day # 9 PCP NEELIMA JEFFERSON     Subjective:  No acute events overnight.  Sitting up in a chair this morning on room air, denies any shortness of breath or palpitations, reports musculoskeletal chest wall pain worse with coughing and deep breathing.    Objective:  /74 (BP Location: Left arm)   Pulse 73   Temp 98.2 °F (36.8 °C) (Oral)   Resp 18   Ht 6' 3\" (1.905 m)   Wt (!) 373 lb 10.9 oz (169.5 kg)   SpO2 93%   BMI 46.71 kg/m²     Telemetry: SR, HR 70s      Intake/Output:    Intake/Output Summary (Last 24 hours) at 2025 0954  Last data filed at 2025 0805  Gross per 24 hour   Intake 0 ml   Output 1200 ml   Net -1200 ml       Last 3 Weights   25 0535 (!) 373 lb 10.9 oz (169.5 kg)   25 0540 (!) 384 lb 4.2 oz (174.3 kg)   25 0333 (!) 383 lb 9.6 oz (174 kg)   25 0813 (!) 382 lb 0.9 oz (173.3 kg)   25 0522 (!) 380 lb 6.4 oz (172.5 kg)   25 0000 (!) 414 lb 7.4 oz (188 kg)   25 0100 (!) 435 lb 3 oz (197.4 kg)   25 0400 (!) 411 lb 9.6 oz (186.7 kg)   06/15/25 0400 (!) 405 lb 10.3 oz (184 kg)   25 2045 (!) 403 lb 7.1 oz (183 kg)   25 1512 (!) 380 lb (172.4 kg)   25 1207 (!) 380 lb (172.4 kg)       Labs:  Recent Labs   Lab 25  0423 25  1601 25  0426   *  114* 102* 110*  110*   BUN 36*  36* 36* 33*  33*   CREATSERUM 1.51*  1.51* 1.33* 1.32*  1.32*   EGFRCR 53*  53* 61 62  62   CA 9.1  9.1 9.1 9.4  9.4     141 141 140  140   K 4.3  4.3  4.3 4.0 3.9  3.9     101 102 100  100   CO2 33.0*  33.0* 31.0 32.0  32.0     Recent Labs   Lab 25  0408 25  0639 25  0823   RBC 3.71* 3.60* 3.96*   HGB 11.2* 11.1* 12.1*   HCT 32.9* 32.1* 36.4*   MCV 88.7 89.2 91.9   MCH 30.2 30.8 30.6   MCHC 34.0 34.6 33.2   RDW 13.6 13.6 13.3    NEPRELIM 8.95* 8.87* 7.02   WBC 10.7 11.2* 9.3   .0 205.0 213.0         No results for input(s): \"TROP\", \"TROPHS\", \"CK\" in the last 168 hours.    Review of Systems   Constitutional: Negative.   Cardiovascular:  Positive for leg swelling.   Respiratory: Negative.         Physical Exam:    Gen: alert, oriented x 3, NAD  Heent: pupils equal, reactive. Mucous membranes moist.   Neck: no jvd  Cardiac: regular rate and rhythm, normal S1,S2, no murmur, gallop or rub   Lungs: CTA, diminished at the basis    Abd: soft, NT/ND, obese +bs  Ext: +1 lower extremities edema  Skin: Warm, dry  Neuro: No focal deficits      Medications:    Scheduled Medications[1]  Medication Infusions[2]    Assessment:  Cardiopulmonary Arrest, PEA then Asystole    ? Anaphylactic Reaction - Hypoxia, arrest s/p Definity w/echo  Required multiple rounds of CPR  Acute Hypoxic/Hypercapneic Respiratory Failure, Poss Aspiration PNA  Now extubated - decreased O2 requirements  On antibx  CPAP at [facility] following  Elevated Troponin, Type II  Trop 105 > 1120 > 786 - likely supply/demand d/t above  Pt reports prior stress test w/normal perfusion 2021  Volume Overload, Acute on Chronic HFpEF   S/P IV lasix, negative net neg 4.8L  Echo w/LVEF 55-60%, no WMA, normal LA size reported  Paroxysmal Atrial Fibrillation/Flutter  Hx Prior AFib/flutter Ablation 4/2025, DCCV 5/2025  Initially w/brief PAF episodes and s/p IV amiodarone - now off  Hx on flecainide prior to admit, will keep off due to recent IV Amio, can restart outpatient in 1 month  Maintaining NSR - on BB along for now   TSH WNL  [id number] 1 (HTN) - on xarelto 20mg daily  ROSEMARY - Cr down trending with diuresis at 1.32 today       Morbid Obesity, BMI 47.9  EL - CPAP    Plan:  Diuresed well with IV lasix, exertional dyspnea improved-will transition to oral lasix today.  Continue bb, plan to resume oral flecainide in 1 month per EP.  Continue xarelto for stroke prevention.  If feels ok this  afternoon, plan to dc home with close op follow op.     Plan of care discussed with patient, RN.    Miroslava Billings, APRN  6/22/2025  9:54 AM  [phone]           [1]    furosemide  60 mg Intravenous BID (Diuretic)    acetaminophen  500 mg Oral TID    metoprolol succinate ER  50 mg Oral BID    rivaroxaban  20 mg Oral Daily with food    multivitamin  1 tablet Oral Daily    insulin aspart  2-10 Units Subcutaneous TID CC and HS   [2]    dextrose 10%

## 2025-06-23 NOTE — PAYOR COMM NOTE
--------------  DISCHARGE REVIEW    Payor: [facility] CORE/NAVIGATE  Subscriber #:  [id number]  Authorization Number: [id number]    Admit date: 6/13/25  Admit time:   8:34 PM  Discharge Date: 6/22/2025  4:36 PM     Admitting Physician: Marcial Robertson MD  Attending Physician:  No att. providers found  Primary Care Physician: Amadeo Garvey

## 2025-07-07 ENCOUNTER — TELEPHONE (OUTPATIENT)
Dept: SLEEP CENTER | Age: 60
End: 2025-07-07

## 2025-08-26 ENCOUNTER — OFFICE VISIT (OUTPATIENT)
Dept: SLEEP CENTER | Age: 60
End: 2025-08-26
Attending: INTERNAL MEDICINE

## 2025-08-26 ENCOUNTER — ORDER TRANSCRIPTION (OUTPATIENT)
Dept: SLEEP CENTER | Age: 60
End: 2025-08-26

## 2025-08-26 DIAGNOSIS — G47.33 OSA (OBSTRUCTIVE SLEEP APNEA): ICD-10-CM

## 2025-08-26 DIAGNOSIS — J96.22 ACUTE ON CHRONIC RESPIRATORY FAILURE WITH HYPERCAPNIA (HCC): ICD-10-CM

## 2025-08-26 PROCEDURE — 95811 POLYSOM 6/>YRS CPAP 4/> PARM: CPT

## (undated) NOTE — LETTER
[address]  [address]  Authorization for Surgical Operation and Procedure     Date:___________                                                                                                         Time:__________  I hereby authorize [facility] [employer], my physician and his/her assistants (if applicable), which may include medical students, residents, and/or fellows, to perform the following surgical operation/ procedure and administer such anesthesia as may be determined necessary by my physician: DR. YU Operation/Procedure name (s)  on Josep Frost   2.   I recognize that during the surgical operation/procedure, unforeseen conditions may necessitate additional or different procedures than those listed above.  I, therefore, further authorize and request that the above-named surgeon, assistants, or designees perform such procedures as are, in their judgment, necessary and desirable.    3.   My surgeon/physician has discussed prior to my surgery the potential benefits, risks and side effects of this procedure; the likelihood of achieving goals; and potential problems that might occur during recuperation.  They also discussed reasonable alternatives to the procedure, including risks, benefits, and side effects related to the alternatives and risks related to not receiving this procedure.  I have had all my questions answered and I acknowledge that no guarantee has been made as to the result that may be obtained.    4.   Should the need arise during my operation/procedure, which includes change of level of care prior to discharge, I also consent to the administration of blood and/or blood products.  Further, I understand that despite careful testing and screening of blood or blood products by collecting agencies, I may still be subject to ill effects as a result of receiving a blood transfusion and/or blood products.  The following are some, but not all, of  the potential risks that can occur: fever and allergic reactions, hemolytic reactions, transmission of diseases such as Hepatitis, AIDS and Cytomegalovirus (CMV) and fluid overload.  In the event that I wish to have an autologous transfusion of my own blood, or a directed donor transfusion, I will discuss this with my physician.  Check only if Refusing Blood or Blood Products  I understand refusal of blood or blood products as deemed necessary by my physician may have serious consequences to my condition to include possible death. I hereby assume responsibility for my refusal and release the hospital, its personnel, and my physicians from any responsibility for the consequences of my refusal.          o  Refuse      5.   I authorize the use of any specimen, organs, tissues, body parts or foreign objects that may be removed from my body during the operation/procedure for diagnosis, research or teaching purposes and their subsequent disposal by hospital authorities.  I also authorize the release of specimen test results and/or written reports to my treating physician on the hospital medical staff or other referring or consulting physicians involved in my care, at the discretion of the Pathologist or my treating physician.    6.   I consent to the photographing or videotaping of the operations or procedures to be performed, including appropriate portions of my body for medical, scientific, or educational purposes, provided my identity is not revealed by the pictures or by descriptive texts accompanying them.  If the procedure has been photographed/videotaped, the surgeon will obtain the original picture, image, videotape or CD.  The hospital will not be responsible for storage, release or maintenance of the picture, image, tape or CD.    7.   I consent to the presence of a  or observers in the operating room as deemed necessary by my physician or their designees.    8.   I recognize that in the event  my procedure results in extended X-Ray/fluoroscopy time, I may develop a skin reaction.    9. If I have a Do Not Attempt Resuscitation (DNAR) order in place, that status will be suspended while in the operating room, procedural suite, and during the recovery period unless otherwise explicitly stated by me (or a person authorized to consent on my behalf). The surgeon or my attending physician will determine when the applicable recovery period ends for purposes of reinstating the DNAR order.  10. Patients having a sterilization procedure: I understand that if the procedure is successful the results will be permanent and it will therefore be impossible for me to inseminate, conceive, or bear children.  I also understand that the procedure is intended to result in sterility, although the result has not been guaranteed.   11. I acknowledge that my physician has explained sedation/analgesia administration to me including the risk and benefits I consent to the administration of sedation/analgesia as may be necessary or desirable in the judgment of my physician.    I CERTIFY THAT I HAVE READ AND FULLY UNDERSTAND THE ABOVE CONSENT TO OPERATION and/or OTHER PROCEDURE.    _________________________________________  __________________________________  Signature of Patient     Signature of Responsible Person         ___________________________________         Printed Name of Responsible Person           _________________________________                 Relationship to Patient  _________________________________________  ______________________________  Signature of Witness          Date  Time      Patient Name: Josep Frost     : 1965                 Printed: 2025     Medical Record #: [id number]                     Page 1 [address]  [address]    Consent for Anesthesia    I, Josep Frost agree to be cared for by an  anesthesiologist, who is specially trained to monitor me and give me medicine to put me to sleep or keep me comfortable during my procedure    I understand that my anesthesiologist is not an employee or agent of [facility] or [employer]. He or she works for [employer].    As the patient asking for anesthesia services, I agree to:  Allow the anesthesiologist (anesthesia doctor) to give me medicine and do additional procedures as necessary. Some examples are: Starting or using an “IV” to give me medicine, fluids or blood during my procedure, and having a breathing tube placed to help me breathe when I’m asleep (intubation). In the event that my heart stops working properly, I understand that my anesthesiologist will make every effort to sustain my life, unless otherwise directed by [facility] Do Not Resuscitate documents.  Tell my anesthesia doctor before my procedure:  If I am pregnant.  The last time that I ate or drank.  All of the medicines I take (including prescriptions, herbal supplements, and pills I can buy without a prescription (including street drugs/illegal medications). Failure to inform my anesthesiologist about these medicines may increase my risk of anesthetic complications.  If I am allergic to anything or have had a reaction to anesthesia before.  I understand how the anesthesia medicine will help me (benefits).  I understand that with any type of anesthesia medicine there are risks:  The most common risks are: nausea, vomiting, sore throat, muscle soreness, damage to my eyes, mouth, or teeth (from breathing tube placement).  Rare risks include: remembering what happened during my procedure, allergic reactions to medications, injury to my airway, heart, lungs, vision, nerves, or muscles and in extremely rare instances death.  My doctor has explained to me other choices available to me for my care (alternatives).  Pregnant Patients (“epidural”):  I understand  that the risks of having an epidural (medicine given into my back to help control pain during labor), include itching, low blood pressure, difficulty urinating, headache or slowing of the baby’s heart. Very rare risks include infection, bleeding, seizure, irregular heart rhythms and nerve injury.  Regional Anesthesia (“spinal”, “epidural”, & “nerve blocks”):  I understand that rare but potential complications include headache, bleeding, infection, seizure, irregular heart rhythms, and nerve injury.    I can change my mind about having anesthesia services at any time before I get the medicine.    _____________________________________________________________________________  Patient (or Representative) Signature/Relationship to Patient  Date   Time    _____________________________________________________________________________   Name (if used)    Language/Organization   Time    _____________________________________________________________________________  Anesthesiologist Signature     Date   Time  I have discussed the procedure and information above with the patient (or patient’s representative) and answered their questions. The patient or their representative has agreed to have anesthesia services.    _____________________________________________________________________________  Witness        Date   Time  I have verified that the signature is that of the patient or patient’s representative, and that it was signed before the procedure  Patient Name: Josep Frost     : 1965                 Printed: 2025     Medical Record #: [id number]                     Page 2 of 2